# Patient Record
Sex: MALE | Race: WHITE | NOT HISPANIC OR LATINO | Employment: OTHER | URBAN - METROPOLITAN AREA
[De-identification: names, ages, dates, MRNs, and addresses within clinical notes are randomized per-mention and may not be internally consistent; named-entity substitution may affect disease eponyms.]

---

## 2017-06-16 RX ORDER — TAMSULOSIN HYDROCHLORIDE 0.4 MG/1
CAPSULE ORAL
Qty: 30 CAPSULE | Refills: 6 | Status: ON HOLD | OUTPATIENT
Start: 2017-06-16 | End: 2021-03-09 | Stop reason: HOSPADM

## 2018-05-07 RX ORDER — TAMSULOSIN HYDROCHLORIDE 0.4 MG/1
CAPSULE ORAL
Qty: 30 CAPSULE | OUTPATIENT
Start: 2018-05-07

## 2021-03-03 ENCOUNTER — HOSPITAL ENCOUNTER (INPATIENT)
Facility: HOSPITAL | Age: 56
LOS: 4 days | Discharge: HOME | DRG: 234 | End: 2021-03-09
Attending: EMERGENCY MEDICINE | Admitting: THORACIC SURGERY (CARDIOTHORACIC VASCULAR SURGERY)
Payer: MEDICARE

## 2021-03-03 DIAGNOSIS — Z95.1 HX OF CABG: ICD-10-CM

## 2021-03-03 DIAGNOSIS — I25.10 CORONARY ARTERY DISEASE, ANGINA PRESENCE UNSPECIFIED, UNSPECIFIED VESSEL OR LESION TYPE, UNSPECIFIED WHETHER NATIVE OR TRANSPLANTED HEART: ICD-10-CM

## 2021-03-03 DIAGNOSIS — R07.9 CHEST PAIN: ICD-10-CM

## 2021-03-03 DIAGNOSIS — R07.9 CHEST PAIN, UNSPECIFIED TYPE: ICD-10-CM

## 2021-03-03 DIAGNOSIS — Z95.1 S/P CABG X 1: Primary | ICD-10-CM

## 2021-03-03 DIAGNOSIS — I25.10 CAD (CORONARY ARTERY DISEASE): ICD-10-CM

## 2021-03-03 DIAGNOSIS — Z95.1 S/P CABG (CORONARY ARTERY BYPASS GRAFT): ICD-10-CM

## 2021-03-03 DIAGNOSIS — I25.10 CORONARY ARTERY DISEASE INVOLVING NATIVE CORONARY ARTERY OF NATIVE HEART, ANGINA PRESENCE UNSPECIFIED: ICD-10-CM

## 2021-03-03 LAB
ALBUMIN SERPL BCP-MCNC: 4.3 G/DL (ref 3.5–5.2)
ALP SERPL-CCNC: 55 U/L (ref 55–135)
ALT SERPL W/O P-5'-P-CCNC: 22 U/L (ref 10–44)
AMPHET+METHAMPHET UR QL: NEGATIVE
ANION GAP SERPL CALC-SCNC: 10 MMOL/L (ref 8–16)
APTT PPP: 25 SEC (ref 23.6–33.3)
AST SERPL-CCNC: 20 U/L (ref 10–40)
BARBITURATES UR QL SCN>200 NG/ML: NEGATIVE
BASOPHILS # BLD AUTO: 0.08 K/UL (ref 0–0.2)
BASOPHILS NFR BLD: 1 % (ref 0–1.9)
BENZODIAZ UR QL SCN>200 NG/ML: NEGATIVE
BILIRUB SERPL-MCNC: 0.7 MG/DL (ref 0.1–1)
BILIRUB UR QL STRIP: NEGATIVE
BNP SERPL-MCNC: 13 PG/ML (ref 0–99)
BNP SERPL-MCNC: 13 PG/ML (ref 0–99)
BUN SERPL-MCNC: 17 MG/DL (ref 6–20)
BZE UR QL SCN: NEGATIVE
CALCIUM SERPL-MCNC: 8.6 MG/DL (ref 8.7–10.5)
CANNABINOIDS UR QL SCN: NEGATIVE
CHLORIDE SERPL-SCNC: 107 MMOL/L (ref 95–110)
CLARITY UR: CLEAR
CO2 SERPL-SCNC: 23 MMOL/L (ref 23–29)
COLOR UR: YELLOW
CREAT SERPL-MCNC: 1.2 MG/DL (ref 0.5–1.4)
CREAT UR-MCNC: 113 MG/DL (ref 23–375)
DIFFERENTIAL METHOD: NORMAL
EOSINOPHIL # BLD AUTO: 0.5 K/UL (ref 0–0.5)
EOSINOPHIL NFR BLD: 5.9 % (ref 0–8)
ERYTHROCYTE [DISTWIDTH] IN BLOOD BY AUTOMATED COUNT: 12.6 % (ref 11.5–14.5)
EST. GFR  (AFRICAN AMERICAN): >60 ML/MIN/1.73 M^2
EST. GFR  (NON AFRICAN AMERICAN): >60 ML/MIN/1.73 M^2
GLUCOSE SERPL-MCNC: 116 MG/DL (ref 70–110)
GLUCOSE UR QL STRIP: NEGATIVE
HCT VFR BLD AUTO: 43.9 % (ref 40–54)
HGB BLD-MCNC: 14.5 G/DL (ref 14–18)
HGB UR QL STRIP: NEGATIVE
IMM GRANULOCYTES # BLD AUTO: 0.03 K/UL (ref 0–0.04)
IMM GRANULOCYTES NFR BLD AUTO: 0.4 % (ref 0–0.5)
INR PPP: 1.1
KETONES UR QL STRIP: NEGATIVE
LEUKOCYTE ESTERASE UR QL STRIP: NEGATIVE
LIPASE SERPL-CCNC: 25 U/L (ref 4–60)
LYMPHOCYTES # BLD AUTO: 1.6 K/UL (ref 1–4.8)
LYMPHOCYTES NFR BLD: 21 % (ref 18–48)
MAGNESIUM SERPL-MCNC: 1.8 MG/DL (ref 1.6–2.6)
MCH RBC QN AUTO: 28.4 PG (ref 27–31)
MCHC RBC AUTO-ENTMCNC: 33 G/DL (ref 32–36)
MCV RBC AUTO: 86 FL (ref 82–98)
MONOCYTES # BLD AUTO: 0.6 K/UL (ref 0.3–1)
MONOCYTES NFR BLD: 7.2 % (ref 4–15)
NEUTROPHILS # BLD AUTO: 5 K/UL (ref 1.8–7.7)
NEUTROPHILS NFR BLD: 64.5 % (ref 38–73)
NITRITE UR QL STRIP: NEGATIVE
NRBC BLD-RTO: 0 /100 WBC
OPIATES UR QL SCN: NORMAL
PCP UR QL SCN>25 NG/ML: NEGATIVE
PH UR STRIP: 7 [PH] (ref 5–8)
PLATELET # BLD AUTO: 180 K/UL (ref 150–350)
PMV BLD AUTO: 11.8 FL (ref 9.2–12.9)
POTASSIUM SERPL-SCNC: 4 MMOL/L (ref 3.5–5.1)
PROT SERPL-MCNC: 7.2 G/DL (ref 6–8.4)
PROT UR QL STRIP: NEGATIVE
PROTHROMBIN TIME: 13.2 SEC (ref 10.6–14.8)
RBC # BLD AUTO: 5.11 M/UL (ref 4.6–6.2)
SARS-COV-2 RDRP RESP QL NAA+PROBE: NEGATIVE
SODIUM SERPL-SCNC: 140 MMOL/L (ref 136–145)
SP GR UR STRIP: 1.02 (ref 1–1.03)
TOXICOLOGY INFORMATION: NORMAL
TROPONIN I SERPL DL<=0.01 NG/ML-MCNC: <0.03 NG/ML
TROPONIN I SERPL DL<=0.01 NG/ML-MCNC: <0.03 NG/ML
TSH SERPL DL<=0.005 MIU/L-ACNC: 1.55 UIU/ML (ref 0.34–5.6)
URN SPEC COLLECT METH UR: NORMAL
UROBILINOGEN UR STRIP-ACNC: NEGATIVE EU/DL
WBC # BLD AUTO: 7.67 K/UL (ref 3.9–12.7)

## 2021-03-03 PROCEDURE — 93010 EKG 12-LEAD: ICD-10-PCS | Mod: ,,, | Performed by: INTERNAL MEDICINE

## 2021-03-03 PROCEDURE — 83690 ASSAY OF LIPASE: CPT | Performed by: EMERGENCY MEDICINE

## 2021-03-03 PROCEDURE — 99285 EMERGENCY DEPT VISIT HI MDM: CPT | Mod: 25

## 2021-03-03 PROCEDURE — G0378 HOSPITAL OBSERVATION PER HR: HCPCS

## 2021-03-03 PROCEDURE — 25000003 PHARM REV CODE 250: Performed by: NURSE PRACTITIONER

## 2021-03-03 PROCEDURE — 36415 COLL VENOUS BLD VENIPUNCTURE: CPT | Performed by: NURSE PRACTITIONER

## 2021-03-03 PROCEDURE — 81003 URINALYSIS AUTO W/O SCOPE: CPT | Performed by: EMERGENCY MEDICINE

## 2021-03-03 PROCEDURE — 84484 ASSAY OF TROPONIN QUANT: CPT | Performed by: NURSE PRACTITIONER

## 2021-03-03 PROCEDURE — 85025 COMPLETE CBC W/AUTO DIFF WBC: CPT | Performed by: NURSE PRACTITIONER

## 2021-03-03 PROCEDURE — 80053 COMPREHEN METABOLIC PANEL: CPT | Performed by: NURSE PRACTITIONER

## 2021-03-03 PROCEDURE — 85610 PROTHROMBIN TIME: CPT | Performed by: NURSE PRACTITIONER

## 2021-03-03 PROCEDURE — 93005 ELECTROCARDIOGRAM TRACING: CPT | Performed by: INTERNAL MEDICINE

## 2021-03-03 PROCEDURE — 96372 THER/PROPH/DIAG INJ SC/IM: CPT

## 2021-03-03 PROCEDURE — 63600175 PHARM REV CODE 636 W HCPCS: Performed by: NURSE PRACTITIONER

## 2021-03-03 PROCEDURE — 84443 ASSAY THYROID STIM HORMONE: CPT | Performed by: EMERGENCY MEDICINE

## 2021-03-03 PROCEDURE — 80307 DRUG TEST PRSMV CHEM ANLYZR: CPT | Performed by: EMERGENCY MEDICINE

## 2021-03-03 PROCEDURE — 84484 ASSAY OF TROPONIN QUANT: CPT | Mod: 91 | Performed by: NURSE PRACTITIONER

## 2021-03-03 PROCEDURE — U0002 COVID-19 LAB TEST NON-CDC: HCPCS | Performed by: EMERGENCY MEDICINE

## 2021-03-03 PROCEDURE — 83735 ASSAY OF MAGNESIUM: CPT | Performed by: EMERGENCY MEDICINE

## 2021-03-03 PROCEDURE — 83880 ASSAY OF NATRIURETIC PEPTIDE: CPT | Performed by: NURSE PRACTITIONER

## 2021-03-03 PROCEDURE — 85730 THROMBOPLASTIN TIME PARTIAL: CPT | Performed by: EMERGENCY MEDICINE

## 2021-03-03 PROCEDURE — 93010 ELECTROCARDIOGRAM REPORT: CPT | Mod: ,,, | Performed by: INTERNAL MEDICINE

## 2021-03-03 RX ORDER — NAPROXEN SODIUM 220 MG/1
324 TABLET, FILM COATED ORAL ONCE
Status: COMPLETED | OUTPATIENT
Start: 2021-03-03 | End: 2021-03-03

## 2021-03-03 RX ORDER — NAPROXEN SODIUM 220 MG/1
81 TABLET, FILM COATED ORAL DAILY
Status: DISCONTINUED | OUTPATIENT
Start: 2021-03-04 | End: 2021-03-05

## 2021-03-03 RX ORDER — ZOLPIDEM TARTRATE 5 MG/1
10 TABLET ORAL NIGHTLY PRN
Status: DISCONTINUED | OUTPATIENT
Start: 2021-03-03 | End: 2021-03-05

## 2021-03-03 RX ORDER — MORPHINE SULFATE 15 MG/1
15 TABLET, FILM COATED, EXTENDED RELEASE ORAL EVERY 12 HOURS
Status: DISCONTINUED | OUTPATIENT
Start: 2021-03-03 | End: 2021-03-04

## 2021-03-03 RX ORDER — ONDANSETRON 2 MG/ML
4 INJECTION INTRAMUSCULAR; INTRAVENOUS EVERY 6 HOURS PRN
Status: DISCONTINUED | OUTPATIENT
Start: 2021-03-03 | End: 2021-03-05

## 2021-03-03 RX ORDER — SODIUM CHLORIDE 0.9 % (FLUSH) 0.9 %
10 SYRINGE (ML) INJECTION
Status: DISCONTINUED | OUTPATIENT
Start: 2021-03-03 | End: 2021-03-05

## 2021-03-03 RX ORDER — PANTOPRAZOLE SODIUM 40 MG/1
40 TABLET, DELAYED RELEASE ORAL DAILY
Status: DISCONTINUED | OUTPATIENT
Start: 2021-03-04 | End: 2021-03-05

## 2021-03-03 RX ORDER — TAMSULOSIN HYDROCHLORIDE 0.4 MG/1
1 CAPSULE ORAL DAILY
Status: DISCONTINUED | OUTPATIENT
Start: 2021-03-04 | End: 2021-03-05

## 2021-03-03 RX ORDER — ATORVASTATIN CALCIUM 10 MG/1
10 TABLET, FILM COATED ORAL DAILY
Status: DISCONTINUED | OUTPATIENT
Start: 2021-03-04 | End: 2021-03-05

## 2021-03-03 RX ORDER — ASPIRIN 325 MG
325 TABLET ORAL ONCE
Status: ON HOLD | COMMUNITY
End: 2021-03-09 | Stop reason: HOSPADM

## 2021-03-03 RX ORDER — LISINOPRIL AND HYDROCHLOROTHIAZIDE 10; 12.5 MG/1; MG/1
1 TABLET ORAL DAILY
Status: DISCONTINUED | OUTPATIENT
Start: 2021-03-04 | End: 2021-03-03

## 2021-03-03 RX ORDER — NITROGLYCERIN 0.4 MG/1
0.4 TABLET SUBLINGUAL EVERY 5 MIN PRN
Status: DISCONTINUED | OUTPATIENT
Start: 2021-03-03 | End: 2021-03-05

## 2021-03-03 RX ORDER — ENOXAPARIN SODIUM 100 MG/ML
40 INJECTION SUBCUTANEOUS EVERY 24 HOURS
Status: DISCONTINUED | OUTPATIENT
Start: 2021-03-03 | End: 2021-03-05

## 2021-03-03 RX ORDER — HYDROCHLOROTHIAZIDE 12.5 MG/1
12.5 TABLET ORAL DAILY
Status: DISCONTINUED | OUTPATIENT
Start: 2021-03-04 | End: 2021-03-05

## 2021-03-03 RX ORDER — LISINOPRIL 10 MG/1
10 TABLET ORAL DAILY
Status: DISCONTINUED | OUTPATIENT
Start: 2021-03-04 | End: 2021-03-05

## 2021-03-03 RX ORDER — ASPIRIN 325 MG
325 TABLET ORAL ONCE
Status: COMPLETED | OUTPATIENT
Start: 2021-03-03 | End: 2021-03-03

## 2021-03-03 RX ORDER — ACETAMINOPHEN 325 MG/1
650 TABLET ORAL EVERY 4 HOURS PRN
Status: DISCONTINUED | OUTPATIENT
Start: 2021-03-03 | End: 2021-03-05

## 2021-03-03 RX ADMIN — ASPIRIN 325 MG ORAL TABLET 325 MG: 325 PILL ORAL at 06:03

## 2021-03-03 RX ADMIN — ASPIRIN 81 MG 324 MG: 81 TABLET ORAL at 03:03

## 2021-03-03 RX ADMIN — ENOXAPARIN SODIUM 40 MG: 40 INJECTION SUBCUTANEOUS at 09:03

## 2021-03-03 RX ADMIN — MORPHINE SULFATE 15 MG: 15 TABLET, FILM COATED, EXTENDED RELEASE ORAL at 09:03

## 2021-03-04 ENCOUNTER — ANESTHESIA EVENT (OUTPATIENT)
Dept: SURGERY | Facility: HOSPITAL | Age: 56
DRG: 234 | End: 2021-03-04
Payer: MEDICARE

## 2021-03-04 ENCOUNTER — CLINICAL SUPPORT (OUTPATIENT)
Dept: CARDIOLOGY | Facility: HOSPITAL | Age: 56
DRG: 234 | End: 2021-03-04
Payer: MEDICARE

## 2021-03-04 PROBLEM — M54.9 CHRONIC BACK PAIN: Status: ACTIVE | Noted: 2021-03-04

## 2021-03-04 PROBLEM — I10 HYPERTENSION: Status: ACTIVE | Noted: 2021-03-04

## 2021-03-04 PROBLEM — N40.0 BPH (BENIGN PROSTATIC HYPERPLASIA): Status: ACTIVE | Noted: 2021-03-04

## 2021-03-04 PROBLEM — E78.00 HYPERCHOLESTEROLEMIA: Status: ACTIVE | Noted: 2021-03-04

## 2021-03-04 PROBLEM — Z79.891 OPIOID USE AGREEMENT EXISTS: Status: ACTIVE | Noted: 2021-03-04

## 2021-03-04 PROBLEM — G89.29 CHRONIC BACK PAIN: Status: ACTIVE | Noted: 2021-03-04

## 2021-03-04 LAB
ALBUMIN SERPL BCP-MCNC: 3.9 G/DL (ref 3.5–5.2)
ALP SERPL-CCNC: 50 U/L (ref 55–135)
ALT SERPL W/O P-5'-P-CCNC: 22 U/L (ref 10–44)
ANION GAP SERPL CALC-SCNC: 7 MMOL/L (ref 8–16)
AST SERPL-CCNC: 15 U/L (ref 10–40)
BASOPHILS # BLD AUTO: 0.06 K/UL (ref 0–0.2)
BASOPHILS NFR BLD: 0.8 % (ref 0–1.9)
BILIRUB SERPL-MCNC: 0.8 MG/DL (ref 0.1–1)
BUN SERPL-MCNC: 23 MG/DL (ref 6–20)
CALCIUM SERPL-MCNC: 8.8 MG/DL (ref 8.7–10.5)
CHLORIDE SERPL-SCNC: 107 MMOL/L (ref 95–110)
CHOLEST SERPL-MCNC: 191 MG/DL (ref 120–199)
CHOLEST/HDLC SERPL: 6 {RATIO} (ref 2–5)
CO2 SERPL-SCNC: 24 MMOL/L (ref 23–29)
CREAT SERPL-MCNC: 1.1 MG/DL (ref 0.5–1.4)
DIFFERENTIAL METHOD: NORMAL
EOSINOPHIL # BLD AUTO: 0.5 K/UL (ref 0–0.5)
EOSINOPHIL NFR BLD: 6.2 % (ref 0–8)
ERYTHROCYTE [DISTWIDTH] IN BLOOD BY AUTOMATED COUNT: 12.9 % (ref 11.5–14.5)
EST. GFR  (AFRICAN AMERICAN): >60 ML/MIN/1.73 M^2
EST. GFR  (NON AFRICAN AMERICAN): >60 ML/MIN/1.73 M^2
ESTIMATED AVG GLUCOSE: 126 MG/DL (ref 68–131)
GLUCOSE SERPL-MCNC: 121 MG/DL (ref 70–110)
GLUCOSE SERPL-MCNC: 86 MG/DL (ref 70–110)
HBA1C MFR BLD: 6 % (ref 4.5–6.2)
HCT VFR BLD AUTO: 42.6 % (ref 40–54)
HDLC SERPL-MCNC: 32 MG/DL (ref 40–75)
HDLC SERPL: 16.8 % (ref 20–50)
HGB BLD-MCNC: 14 G/DL (ref 14–18)
IMM GRANULOCYTES # BLD AUTO: 0.03 K/UL (ref 0–0.04)
IMM GRANULOCYTES NFR BLD AUTO: 0.4 % (ref 0–0.5)
LDLC SERPL CALC-MCNC: 140.4 MG/DL (ref 63–159)
LYMPHOCYTES # BLD AUTO: 2 K/UL (ref 1–4.8)
LYMPHOCYTES NFR BLD: 25.1 % (ref 18–48)
MAGNESIUM SERPL-MCNC: 2 MG/DL (ref 1.6–2.6)
MCH RBC QN AUTO: 28.2 PG (ref 27–31)
MCHC RBC AUTO-ENTMCNC: 32.9 G/DL (ref 32–36)
MCV RBC AUTO: 86 FL (ref 82–98)
MONOCYTES # BLD AUTO: 0.5 K/UL (ref 0.3–1)
MONOCYTES NFR BLD: 6.8 % (ref 4–15)
NEUTROPHILS # BLD AUTO: 4.8 K/UL (ref 1.8–7.7)
NEUTROPHILS NFR BLD: 60.7 % (ref 38–73)
NONHDLC SERPL-MCNC: 159 MG/DL
NRBC BLD-RTO: 0 /100 WBC
PLATELET # BLD AUTO: 165 K/UL (ref 150–350)
PMV BLD AUTO: 12.1 FL (ref 9.2–12.9)
POTASSIUM SERPL-SCNC: 4 MMOL/L (ref 3.5–5.1)
PROT SERPL-MCNC: 6.6 G/DL (ref 6–8.4)
RBC # BLD AUTO: 4.96 M/UL (ref 4.6–6.2)
SODIUM SERPL-SCNC: 138 MMOL/L (ref 136–145)
TRIGL SERPL-MCNC: 93 MG/DL (ref 30–150)
TROPONIN I SERPL DL<=0.01 NG/ML-MCNC: <0.03 NG/ML
TROPONIN I SERPL DL<=0.01 NG/ML-MCNC: <0.03 NG/ML
WBC # BLD AUTO: 7.94 K/UL (ref 3.9–12.7)

## 2021-03-04 PROCEDURE — 99900035 HC TECH TIME PER 15 MIN (STAT)

## 2021-03-04 PROCEDURE — C1769 GUIDE WIRE: HCPCS | Performed by: INTERNAL MEDICINE

## 2021-03-04 PROCEDURE — 99152 MOD SED SAME PHYS/QHP 5/>YRS: CPT | Performed by: INTERNAL MEDICINE

## 2021-03-04 PROCEDURE — 99900031 HC PATIENT EDUCATION (STAT)

## 2021-03-04 PROCEDURE — 25000242 PHARM REV CODE 250 ALT 637 W/ HCPCS: Performed by: NURSE PRACTITIONER

## 2021-03-04 PROCEDURE — 93351 STRESS ECHO (CUPID ONLY): ICD-10-PCS | Mod: 26,,, | Performed by: INTERNAL MEDICINE

## 2021-03-04 PROCEDURE — 84484 ASSAY OF TROPONIN QUANT: CPT | Performed by: NURSE PRACTITIONER

## 2021-03-04 PROCEDURE — 93459: ICD-10-PCS | Mod: 26,,, | Performed by: INTERNAL MEDICINE

## 2021-03-04 PROCEDURE — 99153 MOD SED SAME PHYS/QHP EA: CPT | Performed by: INTERNAL MEDICINE

## 2021-03-04 PROCEDURE — 99152 PR MOD CONSCIOUS SEDATION, SAME PHYS, 5+ YRS, FIRST 15 MIN: ICD-10-PCS | Mod: ,,, | Performed by: INTERNAL MEDICINE

## 2021-03-04 PROCEDURE — 25000003 PHARM REV CODE 250: Performed by: INTERNAL MEDICINE

## 2021-03-04 PROCEDURE — 99222 PR INITIAL HOSPITAL CARE,LEVL II: ICD-10-PCS | Mod: 25,,, | Performed by: INTERNAL MEDICINE

## 2021-03-04 PROCEDURE — 36415 COLL VENOUS BLD VENIPUNCTURE: CPT | Performed by: THORACIC SURGERY (CARDIOTHORACIC VASCULAR SURGERY)

## 2021-03-04 PROCEDURE — 36415 COLL VENOUS BLD VENIPUNCTURE: CPT | Performed by: NURSE PRACTITIONER

## 2021-03-04 PROCEDURE — 25500020 PHARM REV CODE 255: Performed by: INTERNAL MEDICINE

## 2021-03-04 PROCEDURE — 93459 L HRT ART/GRFT ANGIO: CPT | Performed by: INTERNAL MEDICINE

## 2021-03-04 PROCEDURE — 83735 ASSAY OF MAGNESIUM: CPT | Performed by: NURSE PRACTITIONER

## 2021-03-04 PROCEDURE — C1725 CATH, TRANSLUMIN NON-LASER: HCPCS | Performed by: INTERNAL MEDICINE

## 2021-03-04 PROCEDURE — 80053 COMPREHEN METABOLIC PANEL: CPT | Performed by: NURSE PRACTITIONER

## 2021-03-04 PROCEDURE — 84484 ASSAY OF TROPONIN QUANT: CPT | Mod: 91 | Performed by: NURSE PRACTITIONER

## 2021-03-04 PROCEDURE — 80061 LIPID PANEL: CPT | Performed by: NURSE PRACTITIONER

## 2021-03-04 PROCEDURE — 93351 STRESS TTE COMPLETE: CPT | Mod: 26,,, | Performed by: INTERNAL MEDICINE

## 2021-03-04 PROCEDURE — G0378 HOSPITAL OBSERVATION PER HR: HCPCS

## 2021-03-04 PROCEDURE — 94761 N-INVAS EAR/PLS OXIMETRY MLT: CPT

## 2021-03-04 PROCEDURE — 93459 L HRT ART/GRFT ANGIO: CPT | Mod: 26,,, | Performed by: INTERNAL MEDICINE

## 2021-03-04 PROCEDURE — 85025 COMPLETE CBC W/AUTO DIFF WBC: CPT | Performed by: NURSE PRACTITIONER

## 2021-03-04 PROCEDURE — 99152 MOD SED SAME PHYS/QHP 5/>YRS: CPT | Mod: ,,, | Performed by: INTERNAL MEDICINE

## 2021-03-04 PROCEDURE — C1894 INTRO/SHEATH, NON-LASER: HCPCS | Performed by: INTERNAL MEDICINE

## 2021-03-04 PROCEDURE — 83036 HEMOGLOBIN GLYCOSYLATED A1C: CPT | Performed by: NURSE PRACTITIONER

## 2021-03-04 PROCEDURE — 25000003 PHARM REV CODE 250: Performed by: NURSE PRACTITIONER

## 2021-03-04 PROCEDURE — 63600175 PHARM REV CODE 636 W HCPCS: Performed by: INTERNAL MEDICINE

## 2021-03-04 PROCEDURE — 93351 STRESS TTE COMPLETE: CPT

## 2021-03-04 PROCEDURE — C1760 CLOSURE DEV, VASC: HCPCS | Performed by: INTERNAL MEDICINE

## 2021-03-04 PROCEDURE — 99222 1ST HOSP IP/OBS MODERATE 55: CPT | Mod: 25,,, | Performed by: INTERNAL MEDICINE

## 2021-03-04 DEVICE — IMPLANTABLE DEVICE: Type: IMPLANTABLE DEVICE | Site: GROIN | Status: FUNCTIONAL

## 2021-03-04 RX ORDER — MIDAZOLAM HYDROCHLORIDE 1 MG/ML
INJECTION INTRAMUSCULAR; INTRAVENOUS
Status: DISCONTINUED | OUTPATIENT
Start: 2021-03-04 | End: 2021-03-04 | Stop reason: HOSPADM

## 2021-03-04 RX ORDER — MORPHINE SULFATE 15 MG/1
15 TABLET ORAL 3 TIMES DAILY PRN
Status: DISCONTINUED | OUTPATIENT
Start: 2021-03-04 | End: 2021-03-05

## 2021-03-04 RX ORDER — CLOPIDOGREL BISULFATE 75 MG/1
75 TABLET ORAL DAILY
Status: DISCONTINUED | OUTPATIENT
Start: 2021-03-05 | End: 2021-03-05

## 2021-03-04 RX ORDER — ACETAMINOPHEN 325 MG/1
650 TABLET ORAL EVERY 4 HOURS PRN
Status: DISCONTINUED | OUTPATIENT
Start: 2021-03-04 | End: 2021-03-05

## 2021-03-04 RX ORDER — NITROGLYCERIN 0.4 MG/1
0.4 TABLET SUBLINGUAL ONCE
Status: COMPLETED | OUTPATIENT
Start: 2021-03-04 | End: 2021-03-04

## 2021-03-04 RX ORDER — LIDOCAINE HYDROCHLORIDE 10 MG/ML
INJECTION, SOLUTION EPIDURAL; INFILTRATION; INTRACAUDAL; PERINEURAL
Status: DISCONTINUED | OUTPATIENT
Start: 2021-03-04 | End: 2021-03-04 | Stop reason: HOSPADM

## 2021-03-04 RX ORDER — SODIUM CHLORIDE 450 MG/100ML
INJECTION, SOLUTION INTRAVENOUS CONTINUOUS
Status: ACTIVE | OUTPATIENT
Start: 2021-03-04 | End: 2021-03-05

## 2021-03-04 RX ORDER — FENTANYL CITRATE 50 UG/ML
INJECTION, SOLUTION INTRAMUSCULAR; INTRAVENOUS
Status: DISCONTINUED | OUTPATIENT
Start: 2021-03-04 | End: 2021-03-04 | Stop reason: HOSPADM

## 2021-03-04 RX ORDER — SODIUM CHLORIDE 450 MG/100ML
INJECTION, SOLUTION INTRAVENOUS CONTINUOUS
Status: DISCONTINUED | OUTPATIENT
Start: 2021-03-04 | End: 2021-03-05

## 2021-03-04 RX ORDER — TALC
9 POWDER (GRAM) TOPICAL NIGHTLY PRN
Status: DISCONTINUED | OUTPATIENT
Start: 2021-03-04 | End: 2021-03-05

## 2021-03-04 RX ORDER — NITROGLYCERIN 0.4 MG/1
0.4 TABLET SUBLINGUAL EVERY 5 MIN PRN
Status: DISCONTINUED | OUTPATIENT
Start: 2021-03-04 | End: 2021-03-05

## 2021-03-04 RX ORDER — CEFAZOLIN SODIUM 2 G/50ML
2 SOLUTION INTRAVENOUS
Status: COMPLETED | OUTPATIENT
Start: 2021-03-04 | End: 2021-03-05

## 2021-03-04 RX ORDER — ONDANSETRON 4 MG/1
8 TABLET, ORALLY DISINTEGRATING ORAL EVERY 8 HOURS PRN
Status: DISCONTINUED | OUTPATIENT
Start: 2021-03-04 | End: 2021-03-05

## 2021-03-04 RX ADMIN — ASPIRIN 81 MG: 81 TABLET, CHEWABLE ORAL at 08:03

## 2021-03-04 RX ADMIN — ATORVASTATIN CALCIUM 10 MG: 10 TABLET, FILM COATED ORAL at 08:03

## 2021-03-04 RX ADMIN — SODIUM CHLORIDE: 0.45 INJECTION, SOLUTION INTRAVENOUS at 02:03

## 2021-03-04 RX ADMIN — NITROGLYCERIN 0.4 MG: 0.4 TABLET, ORALLY DISINTEGRATING SUBLINGUAL at 10:03

## 2021-03-04 RX ADMIN — PANTOPRAZOLE SODIUM 40 MG: 40 TABLET, DELAYED RELEASE ORAL at 08:03

## 2021-03-04 RX ADMIN — TAMSULOSIN HYDROCHLORIDE 0.4 MG: 0.4 CAPSULE ORAL at 08:03

## 2021-03-04 RX ADMIN — HYDROCHLOROTHIAZIDE 12.5 MG: 12.5 TABLET ORAL at 08:03

## 2021-03-04 RX ADMIN — MORPHINE SULFATE 15 MG: 15 TABLET ORAL at 09:03

## 2021-03-04 RX ADMIN — MORPHINE SULFATE 15 MG: 15 TABLET ORAL at 03:03

## 2021-03-04 RX ADMIN — MORPHINE SULFATE 15 MG: 15 TABLET, FILM COATED, EXTENDED RELEASE ORAL at 08:03

## 2021-03-04 RX ADMIN — LISINOPRIL 10 MG: 10 TABLET ORAL at 08:03

## 2021-03-05 ENCOUNTER — ANESTHESIA (OUTPATIENT)
Dept: SURGERY | Facility: HOSPITAL | Age: 56
DRG: 234 | End: 2021-03-05
Payer: MEDICARE

## 2021-03-05 PROBLEM — Z95.1 S/P CABG X 1: Status: ACTIVE | Noted: 2021-03-05

## 2021-03-05 LAB
ABO + RH BLD: NORMAL
ALLENS TEST: POSITIVE
ANION GAP SERPL CALC-SCNC: 14 MMOL/L (ref 8–16)
ANION GAP SERPL CALC-SCNC: 7 MMOL/L (ref 8–16)
ANION GAP SERPL CALC-SCNC: 7 MMOL/L (ref 8–16)
ANION GAP SERPL CALC-SCNC: 9 MMOL/L (ref 8–16)
APTT PPP: 26.4 SEC (ref 23.6–33.3)
APTT PPP: 34.9 SEC (ref 23.6–33.3)
BASOPHILS # BLD AUTO: 0.03 K/UL (ref 0–0.2)
BASOPHILS # BLD AUTO: 0.03 K/UL (ref 0–0.2)
BASOPHILS # BLD AUTO: 0.04 K/UL (ref 0–0.2)
BASOPHILS NFR BLD: 0 % (ref 0–1.9)
BASOPHILS NFR BLD: 0.2 % (ref 0–1.9)
BASOPHILS NFR BLD: 0.3 % (ref 0–1.9)
BASOPHILS NFR BLD: 0.5 % (ref 0–1.9)
BLD GP AB SCN CELLS X3 SERPL QL: NORMAL
BUN SERPL-MCNC: 16 MG/DL (ref 6–20)
BUN SERPL-MCNC: 17 MG/DL (ref 6–20)
BUN SERPL-MCNC: 19 MG/DL (ref 6–20)
BUN SERPL-MCNC: 21 MG/DL (ref 6–20)
CALCIUM SERPL-MCNC: 7.7 MG/DL (ref 8.7–10.5)
CALCIUM SERPL-MCNC: 8.1 MG/DL (ref 8.7–10.5)
CALCIUM SERPL-MCNC: 8.5 MG/DL (ref 8.7–10.5)
CALCIUM SERPL-MCNC: 9.2 MG/DL (ref 8.7–10.5)
CHLORIDE SERPL-SCNC: 104 MMOL/L (ref 95–110)
CHLORIDE SERPL-SCNC: 104 MMOL/L (ref 95–110)
CHLORIDE SERPL-SCNC: 105 MMOL/L (ref 95–110)
CHLORIDE SERPL-SCNC: 109 MMOL/L (ref 95–110)
CO2 SERPL-SCNC: 19 MMOL/L (ref 23–29)
CO2 SERPL-SCNC: 20 MMOL/L (ref 23–29)
CO2 SERPL-SCNC: 24 MMOL/L (ref 23–29)
CO2 SERPL-SCNC: 24 MMOL/L (ref 23–29)
CORRECTED TEMPERATURE (PCO2): 37.7 MMHG
CORRECTED TEMPERATURE (PCO2): 40.5 MMHG
CORRECTED TEMPERATURE (PCO2): 42.2 MMHG
CORRECTED TEMPERATURE (PCO2): 47.7 MMHG
CORRECTED TEMPERATURE (PCO2): 49.2 MMHG
CORRECTED TEMPERATURE (PH): 7.2
CORRECTED TEMPERATURE (PH): 7.26
CORRECTED TEMPERATURE (PH): 7.35
CORRECTED TEMPERATURE (PH): 7.39
CORRECTED TEMPERATURE (PH): 7.43
CORRECTED TEMPERATURE (PO2): 68.5 MMHG
CORRECTED TEMPERATURE (PO2): 82.2 MMHG
CORRECTED TEMPERATURE (PO2): 84.6 MMHG
CORRECTED TEMPERATURE (PO2): 98 MMHG
CREAT SERPL-MCNC: 0.8 MG/DL (ref 0.5–1.4)
CREAT SERPL-MCNC: 1 MG/DL (ref 0.5–1.4)
CREAT SERPL-MCNC: 1.5 MG/DL (ref 0.5–1.4)
CREAT SERPL-MCNC: 1.7 MG/DL (ref 0.5–1.4)
DIFFERENTIAL METHOD: ABNORMAL
DIFFERENTIAL METHOD: NORMAL
EOSINOPHIL # BLD AUTO: 0 K/UL (ref 0–0.5)
EOSINOPHIL # BLD AUTO: 0.2 K/UL (ref 0–0.5)
EOSINOPHIL # BLD AUTO: 0.2 K/UL (ref 0–0.5)
EOSINOPHIL NFR BLD: 0 % (ref 0–8)
EOSINOPHIL NFR BLD: 0 % (ref 0–8)
EOSINOPHIL NFR BLD: 1.7 % (ref 0–8)
EOSINOPHIL NFR BLD: 2.1 % (ref 0–8)
ERYTHROCYTE [DISTWIDTH] IN BLOOD BY AUTOMATED COUNT: 12.6 % (ref 11.5–14.5)
ERYTHROCYTE [DISTWIDTH] IN BLOOD BY AUTOMATED COUNT: 12.8 % (ref 11.5–14.5)
ERYTHROCYTE [DISTWIDTH] IN BLOOD BY AUTOMATED COUNT: 12.8 % (ref 11.5–14.5)
ERYTHROCYTE [DISTWIDTH] IN BLOOD BY AUTOMATED COUNT: 12.9 % (ref 11.5–14.5)
EST. GFR  (AFRICAN AMERICAN): 51.7 ML/MIN/1.73 M^2
EST. GFR  (AFRICAN AMERICAN): >60 ML/MIN/1.73 M^2
EST. GFR  (NON AFRICAN AMERICAN): 44.7 ML/MIN/1.73 M^2
EST. GFR  (NON AFRICAN AMERICAN): 52 ML/MIN/1.73 M^2
EST. GFR  (NON AFRICAN AMERICAN): >60 ML/MIN/1.73 M^2
EST. GFR  (NON AFRICAN AMERICAN): >60 ML/MIN/1.73 M^2
FIO2: 100 %
FIO2: 21 %
FIO2: 32 %
FIO2: 40 %
FIO2: 40 %
GLUCOSE SERPL-MCNC: 117 MG/DL (ref 70–110)
GLUCOSE SERPL-MCNC: 120 MG/DL (ref 70–110)
GLUCOSE SERPL-MCNC: 122 MG/DL (ref 70–110)
GLUCOSE SERPL-MCNC: 123 MG/DL (ref 70–110)
GLUCOSE SERPL-MCNC: 125 MG/DL (ref 70–110)
GLUCOSE SERPL-MCNC: 125 MG/DL (ref 70–110)
GLUCOSE SERPL-MCNC: 126 MG/DL (ref 70–110)
GLUCOSE SERPL-MCNC: 127 MG/DL (ref 70–100)
GLUCOSE SERPL-MCNC: 135 MG/DL (ref 70–110)
GLUCOSE SERPL-MCNC: 138 MG/DL (ref 70–110)
GLUCOSE SERPL-MCNC: 153 MG/DL (ref 70–100)
GLUCOSE SERPL-MCNC: 159 MG/DL (ref 70–110)
GLUCOSE SERPL-MCNC: 192 MG/DL (ref 70–100)
GLUCOSE SERPL-MCNC: 221 MG/DL (ref 70–100)
GLUCOSE SERPL-MCNC: 98 MG/DL (ref 70–110)
HCO3 UR-SCNC: 18.4 MMOL/L (ref 24–28)
HCO3 UR-SCNC: 22.3 MMOL/L (ref 24–28)
HCO3 UR-SCNC: 23 MMOL/L (ref 24–28)
HCO3 UR-SCNC: 23.2 MMOL/L (ref 24–28)
HCO3 UR-SCNC: 24.6 MMOL/L (ref 24–28)
HCO3 UR-SCNC: 25.3 MMOL/L (ref 24–28)
HCO3 UR-SCNC: 26.7 MMOL/L (ref 24–28)
HCO3 UR-SCNC: 28.8 MMOL/L (ref 24–28)
HCT VFR BLD AUTO: 36.1 % (ref 40–54)
HCT VFR BLD AUTO: 37.8 % (ref 40–54)
HCT VFR BLD AUTO: 40.5 % (ref 40–54)
HCT VFR BLD AUTO: 43.6 % (ref 40–54)
HCT VFR BLD CALC: 35 %PCV (ref 36–54)
HCT VFR BLD CALC: 36 %PCV (ref 36–54)
HCT VFR BLD CALC: 37.8 % (ref 36–54)
HCT VFR BLD CALC: 40 %PCV (ref 36–54)
HCT VFR BLD CALC: 41.7 % (ref 36–54)
HCT VFR BLD CALC: 43.1 % (ref 36–54)
HCT VFR BLD CALC: 44.3 % (ref 36–54)
HGB BLD-MCNC: 11.8 G/DL (ref 14–18)
HGB BLD-MCNC: 12.2 G/DL (ref 14–18)
HGB BLD-MCNC: 13.1 G/DL (ref 14–18)
HGB BLD-MCNC: 14.2 G/DL (ref 14–18)
HGB BLD-MCNC: 14.7 G/DL
IMM GRANULOCYTES # BLD AUTO: 0.04 K/UL (ref 0–0.04)
IMM GRANULOCYTES # BLD AUTO: 0.05 K/UL (ref 0–0.04)
IMM GRANULOCYTES # BLD AUTO: 0.07 K/UL (ref 0–0.04)
IMM GRANULOCYTES # BLD AUTO: ABNORMAL K/UL (ref 0–0.04)
IMM GRANULOCYTES NFR BLD AUTO: 0.5 % (ref 0–0.5)
IMM GRANULOCYTES NFR BLD AUTO: ABNORMAL % (ref 0–0.5)
INR PPP: 1.1
INR PPP: 1.3
LPM: 3
LYMPHOCYTES # BLD AUTO: 0.9 K/UL (ref 1–4.8)
LYMPHOCYTES # BLD AUTO: 1.6 K/UL (ref 1–4.8)
LYMPHOCYTES # BLD AUTO: 1.6 K/UL (ref 1–4.8)
LYMPHOCYTES NFR BLD: 16.8 % (ref 18–48)
LYMPHOCYTES NFR BLD: 18.9 % (ref 18–48)
LYMPHOCYTES NFR BLD: 5.7 % (ref 18–48)
LYMPHOCYTES NFR BLD: 7 % (ref 18–48)
MAGNESIUM SERPL-MCNC: 1.6 MG/DL (ref 1.6–2.6)
MAGNESIUM SERPL-MCNC: 1.9 MG/DL (ref 1.6–2.6)
MAGNESIUM SERPL-MCNC: 2.1 MG/DL (ref 1.6–2.6)
MAGNESIUM SERPL-MCNC: 2.5 MG/DL (ref 1.6–2.6)
MCH RBC QN AUTO: 28 PG (ref 27–31)
MCH RBC QN AUTO: 28.2 PG (ref 27–31)
MCH RBC QN AUTO: 28.3 PG (ref 27–31)
MCH RBC QN AUTO: 28.4 PG (ref 27–31)
MCHC RBC AUTO-ENTMCNC: 32.3 G/DL (ref 32–36)
MCHC RBC AUTO-ENTMCNC: 32.3 G/DL (ref 32–36)
MCHC RBC AUTO-ENTMCNC: 32.6 G/DL (ref 32–36)
MCHC RBC AUTO-ENTMCNC: 32.7 G/DL (ref 32–36)
MCV RBC AUTO: 87 FL (ref 82–98)
MODE: ABNORMAL
MONOCYTES # BLD AUTO: 0.5 K/UL (ref 0.3–1)
MONOCYTES # BLD AUTO: 0.6 K/UL (ref 0.3–1)
MONOCYTES # BLD AUTO: 1.5 K/UL (ref 0.3–1)
MONOCYTES NFR BLD: 10 % (ref 4–15)
MONOCYTES NFR BLD: 5.7 % (ref 4–15)
MONOCYTES NFR BLD: 6 % (ref 4–15)
MONOCYTES NFR BLD: 8 % (ref 4–15)
MRSA SCREEN BY PCR: NEGATIVE
NEUTROPHILS # BLD AUTO: 12.6 K/UL (ref 1.8–7.7)
NEUTROPHILS # BLD AUTO: 6.3 K/UL (ref 1.8–7.7)
NEUTROPHILS # BLD AUTO: 6.9 K/UL (ref 1.8–7.7)
NEUTROPHILS NFR BLD: 65 % (ref 38–73)
NEUTROPHILS NFR BLD: 72.3 % (ref 38–73)
NEUTROPHILS NFR BLD: 74.7 % (ref 38–73)
NEUTROPHILS NFR BLD: 83.6 % (ref 38–73)
NEUTS BAND NFR BLD MANUAL: 20 %
NRBC BLD-RTO: 0 /100 WBC
O2DEVICE: ABNORMAL
O2DEVICE: NORMAL
PCO2 BLDA: 40.1 MMHG (ref 35–45)
PCO2 BLDA: 40.5 MMHG (ref 35–45)
PCO2 BLDA: 42.2 MMHG (ref 35–45)
PCO2 BLDA: 42.3 MMHG (ref 35–45)
PCO2 BLDA: 42.9 MMHG (ref 35–45)
PCO2 BLDA: 45.2 MMHG (ref 35–45)
PCO2 BLDA: 47.7 MMHG (ref 35–45)
PCO2 BLDA: 49.2 MMHG (ref 35–45)
PEEP: 5
PH SMN: 7.2 [PH] (ref 7.35–7.45)
PH SMN: 7.26 [PH] (ref 7.35–7.45)
PH SMN: 7.34 [PH] (ref 7.35–7.45)
PH SMN: 7.35 [PH] (ref 7.35–7.45)
PH SMN: 7.37 [PH] (ref 7.35–7.45)
PH SMN: 7.38 [PH] (ref 7.35–7.45)
PH SMN: 7.43 [PH] (ref 7.35–7.45)
PH SMN: 7.43 [PH] (ref 7.35–7.45)
PLATELET # BLD AUTO: 142 K/UL (ref 150–350)
PLATELET # BLD AUTO: 172 K/UL (ref 150–350)
PLATELET # BLD AUTO: 175 K/UL (ref 150–350)
PLATELET # BLD AUTO: 253 K/UL (ref 150–350)
PLATELET BLD QL SMEAR: ABNORMAL
PMV BLD AUTO: 11.5 FL (ref 9.2–12.9)
PMV BLD AUTO: 11.7 FL (ref 9.2–12.9)
PMV BLD AUTO: 12 FL (ref 9.2–12.9)
PMV BLD AUTO: 12 FL (ref 9.2–12.9)
PO2 BLDA: 464 MMHG (ref 80–100)
PO2 BLDA: 470 MMHG (ref 80–100)
PO2 BLDA: 527 MMHG (ref 80–100)
PO2 BLDA: 574 MMHG (ref 80–100)
PO2 BLDA: 68.5 MMHG (ref 80–100)
PO2 BLDA: 82.2 MMHG (ref 80–100)
PO2 BLDA: 84.6 MMHG (ref 80–100)
PO2 BLDA: 98 MMHG (ref 80–100)
POC ACTIVATED CLOTTING TIME K: 109 SEC (ref 74–137)
POC ACTIVATED CLOTTING TIME K: 114 SEC (ref 74–137)
POC ACTIVATED CLOTTING TIME K: 274 SEC (ref 74–137)
POC ACTIVATED CLOTTING TIME K: 290 SEC (ref 74–137)
POC ACTIVATED CLOTTING TIME K: 296 SEC (ref 74–137)
POC BASE DEFICIT: -2 MMOL/L (ref -2–2)
POC BASE DEFICIT: -2.6 MMOL/L (ref -2–2)
POC BASE DEFICIT: -4.6 MMOL/L (ref -2–2)
POC BASE DEFICIT: -9.7 MMOL/L (ref -2–2)
POC BASE DEFICIT: 2.4 MMOL/L (ref -2–2)
POC BE: -1 MMOL/L
POC BE: 0 MMOL/L
POC BE: 5 MMOL/L
POC COHB: 0.2 % (ref 0–5)
POC IONIZED CALCIUM: 1.08 MMOL/L (ref 1.06–1.42)
POC IONIZED CALCIUM: 1.12 MMOL/L (ref 1.06–1.42)
POC IONIZED CALCIUM: 1.13 MMOL/L (ref 1.06–1.42)
POC IONIZED CALCIUM: 1.13 MMOL/L (ref 1.06–1.42)
POC IONIZED CALCIUM: 1.16 MMOL/L (ref 1.06–1.42)
POC IONIZED CALCIUM: 1.17 MMOL/L (ref 1.06–1.42)
POC IONIZED CALCIUM: 1.19 MMOL/L (ref 1.06–1.42)
POC METHB: 0.9 % (ref 0–3)
POC O2HB: 96.9 %
POC PERFORMED BY: ABNORMAL
POC PERFORMED BY: NORMAL
POC SATURATED O2: 100 % (ref 95–100)
POC SATURATED O2: 93.6 % (ref 95–100)
POC SATURATED O2: 94.4 % (ref 95–100)
POC SATURATED O2: 95 % (ref 95–100)
POC SATURATED O2: 98 % (ref 95–100)
POC SATURATED O2: 98.7 % (ref 95–100)
POC SET RR: 16
POC TCO2: 19.9 MMOL/L (ref 10.3–12)
POC TCO2: 23.8 MMOL/L (ref 10.3–12)
POC TCO2: 24.3 MMOL/L (ref 10.3–12)
POC TCO2: 24.5 MMOL/L (ref 10.3–12)
POC TCO2: 26 MMOL/L (ref 23–27)
POC TCO2: 27 MMOL/L (ref 23–27)
POC TCO2: 28 MMOL/L (ref 10.3–12)
POC TCO2: 30 MMOL/L (ref 23–27)
POC TEMPERATURE: 35.6 C
POC TEMPERATURE: 37 C
POTASSIUM BLD-SCNC: 3.6 MMOL/L (ref 3.5–5.1)
POTASSIUM BLD-SCNC: 3.8 MMOL/L (ref 3.5–5.1)
POTASSIUM BLD-SCNC: 4 MMOL/L (ref 3.5–5.1)
POTASSIUM BLD-SCNC: 4.2 MMOL/L (ref 3.5–5.1)
POTASSIUM BLD-SCNC: 4.2 MMOL/L (ref 3.5–5.1)
POTASSIUM BLD-SCNC: 4.3 MMOL/L (ref 3.5–5.1)
POTASSIUM BLD-SCNC: 4.5 MMOL/L (ref 3.5–5.1)
POTASSIUM SERPL-SCNC: 4 MMOL/L (ref 3.5–5.1)
POTASSIUM SERPL-SCNC: 4.1 MMOL/L (ref 3.5–5.1)
POTASSIUM SERPL-SCNC: 4.2 MMOL/L (ref 3.5–5.1)
POTASSIUM SERPL-SCNC: 4.2 MMOL/L (ref 3.5–5.1)
PRESSURE SUPPORT: 10
PROTHROMBIN TIME: 13.9 SEC (ref 10.6–14.8)
PROTHROMBIN TIME: 15.7 SEC (ref 10.6–14.8)
RBC # BLD AUTO: 4.16 M/UL (ref 4.6–6.2)
RBC # BLD AUTO: 4.36 M/UL (ref 4.6–6.2)
RBC # BLD AUTO: 4.65 M/UL (ref 4.6–6.2)
RBC # BLD AUTO: 5.02 M/UL (ref 4.6–6.2)
SAMPLE: ABNORMAL
SAMPLE: NORMAL
SAMPLE: NORMAL
SODIUM BLD-SCNC: 139 MMOL/L (ref 136–145)
SODIUM BLD-SCNC: 139 MMOL/L (ref 136–145)
SODIUM BLD-SCNC: 140 MMOL/L (ref 136–145)
SODIUM BLD-SCNC: 140 MMOL/L (ref 136–145)
SODIUM BLD-SCNC: 141 MMOL/L (ref 136–145)
SODIUM BLD-SCNC: 142 MMOL/L (ref 136–145)
SODIUM BLD-SCNC: 143 MMOL/L (ref 136–145)
SODIUM SERPL-SCNC: 135 MMOL/L (ref 136–145)
SODIUM SERPL-SCNC: 136 MMOL/L (ref 136–145)
SODIUM SERPL-SCNC: 137 MMOL/L (ref 136–145)
SODIUM SERPL-SCNC: 138 MMOL/L (ref 136–145)
SPECIMEN SOURCE: ABNORMAL
SPECIMEN SOURCE: NORMAL
VT: 600
WBC # BLD AUTO: 15.06 K/UL (ref 3.9–12.7)
WBC # BLD AUTO: 23.34 K/UL (ref 3.9–12.7)
WBC # BLD AUTO: 8.67 K/UL (ref 3.9–12.7)
WBC # BLD AUTO: 9.3 K/UL (ref 3.9–12.7)

## 2021-03-05 PROCEDURE — 84132 ASSAY OF SERUM POTASSIUM: CPT

## 2021-03-05 PROCEDURE — 37000008 HC ANESTHESIA 1ST 15 MINUTES: Performed by: THORACIC SURGERY (CARDIOTHORACIC VASCULAR SURGERY)

## 2021-03-05 PROCEDURE — 27000080 OPTIME MED/SURG SUP & DEVICES GENERAL CLASSIFICATION: Performed by: THORACIC SURGERY (CARDIOTHORACIC VASCULAR SURGERY)

## 2021-03-05 PROCEDURE — 85025 COMPLETE CBC W/AUTO DIFF WBC: CPT | Performed by: INTERNAL MEDICINE

## 2021-03-05 PROCEDURE — 84295 ASSAY OF SERUM SODIUM: CPT

## 2021-03-05 PROCEDURE — C9248 INJ, CLEVIDIPINE BUTYRATE: HCPCS

## 2021-03-05 PROCEDURE — 36600 WITHDRAWAL OF ARTERIAL BLOOD: CPT

## 2021-03-05 PROCEDURE — 85730 THROMBOPLASTIN TIME PARTIAL: CPT | Performed by: THORACIC SURGERY (CARDIOTHORACIC VASCULAR SURGERY)

## 2021-03-05 PROCEDURE — C1729 CATH, DRAINAGE: HCPCS | Performed by: THORACIC SURGERY (CARDIOTHORACIC VASCULAR SURGERY)

## 2021-03-05 PROCEDURE — 87641 MR-STAPH DNA AMP PROBE: CPT | Performed by: THORACIC SURGERY (CARDIOTHORACIC VASCULAR SURGERY)

## 2021-03-05 PROCEDURE — 86900 BLOOD TYPING SEROLOGIC ABO: CPT | Performed by: THORACIC SURGERY (CARDIOTHORACIC VASCULAR SURGERY)

## 2021-03-05 PROCEDURE — 82962 GLUCOSE BLOOD TEST: CPT

## 2021-03-05 PROCEDURE — C9248 INJ, CLEVIDIPINE BUTYRATE: HCPCS | Performed by: THORACIC SURGERY (CARDIOTHORACIC VASCULAR SURGERY)

## 2021-03-05 PROCEDURE — 27100025 HC TUBING, SET FLUID WARMER: Performed by: STUDENT IN AN ORGANIZED HEALTH CARE EDUCATION/TRAINING PROGRAM

## 2021-03-05 PROCEDURE — 27000221 HC OXYGEN, UP TO 24 HOURS

## 2021-03-05 PROCEDURE — 82803 BLOOD GASES ANY COMBINATION: CPT

## 2021-03-05 PROCEDURE — 85730 THROMBOPLASTIN TIME PARTIAL: CPT | Mod: 91 | Performed by: THORACIC SURGERY (CARDIOTHORACIC VASCULAR SURGERY)

## 2021-03-05 PROCEDURE — 27000675 HC TUBING MICRODRIP: Performed by: STUDENT IN AN ORGANIZED HEALTH CARE EDUCATION/TRAINING PROGRAM

## 2021-03-05 PROCEDURE — 37000009 HC ANESTHESIA EA ADD 15 MINS: Performed by: THORACIC SURGERY (CARDIOTHORACIC VASCULAR SURGERY)

## 2021-03-05 PROCEDURE — 27000671 HC TUBING MICROBORE EXT: Performed by: STUDENT IN AN ORGANIZED HEALTH CARE EDUCATION/TRAINING PROGRAM

## 2021-03-05 PROCEDURE — 85007 BL SMEAR W/DIFF WBC COUNT: CPT | Performed by: THORACIC SURGERY (CARDIOTHORACIC VASCULAR SURGERY)

## 2021-03-05 PROCEDURE — 86920 COMPATIBILITY TEST SPIN: CPT | Performed by: THORACIC SURGERY (CARDIOTHORACIC VASCULAR SURGERY)

## 2021-03-05 PROCEDURE — 80048 BASIC METABOLIC PNL TOTAL CA: CPT | Mod: 91 | Performed by: THORACIC SURGERY (CARDIOTHORACIC VASCULAR SURGERY)

## 2021-03-05 PROCEDURE — 83735 ASSAY OF MAGNESIUM: CPT | Performed by: INTERNAL MEDICINE

## 2021-03-05 PROCEDURE — 25000003 PHARM REV CODE 250: Performed by: THORACIC SURGERY (CARDIOTHORACIC VASCULAR SURGERY)

## 2021-03-05 PROCEDURE — 25000003 PHARM REV CODE 250

## 2021-03-05 PROCEDURE — 93005 ELECTROCARDIOGRAM TRACING: CPT | Performed by: INTERNAL MEDICINE

## 2021-03-05 PROCEDURE — 27000683 HC PILLOW THERAPEUTIC

## 2021-03-05 PROCEDURE — 63600175 PHARM REV CODE 636 W HCPCS: Performed by: THORACIC SURGERY (CARDIOTHORACIC VASCULAR SURGERY)

## 2021-03-05 PROCEDURE — 27000673 HC TUBING BLOOD Y: Performed by: STUDENT IN AN ORGANIZED HEALTH CARE EDUCATION/TRAINING PROGRAM

## 2021-03-05 PROCEDURE — 20000000 HC ICU ROOM

## 2021-03-05 PROCEDURE — 27201107 HC STYLET, STANDARD: Performed by: STUDENT IN AN ORGANIZED HEALTH CARE EDUCATION/TRAINING PROGRAM

## 2021-03-05 PROCEDURE — 80048 BASIC METABOLIC PNL TOTAL CA: CPT | Performed by: INTERNAL MEDICINE

## 2021-03-05 PROCEDURE — 82330 ASSAY OF CALCIUM: CPT

## 2021-03-05 PROCEDURE — 27000666 HC INFUSOR PRESSURE BAG: Performed by: STUDENT IN AN ORGANIZED HEALTH CARE EDUCATION/TRAINING PROGRAM

## 2021-03-05 PROCEDURE — C1898 LEAD, PMKR, OTHER THAN TRANS: HCPCS | Performed by: THORACIC SURGERY (CARDIOTHORACIC VASCULAR SURGERY)

## 2021-03-05 PROCEDURE — 27202276 HC INTRODUCER, PERC 8 FR: Performed by: STUDENT IN AN ORGANIZED HEALTH CARE EDUCATION/TRAINING PROGRAM

## 2021-03-05 PROCEDURE — 99900035 HC TECH TIME PER 15 MIN (STAT)

## 2021-03-05 PROCEDURE — 27000676 HC TUBING PRIMARY PLUMSET: Performed by: STUDENT IN AN ORGANIZED HEALTH CARE EDUCATION/TRAINING PROGRAM

## 2021-03-05 PROCEDURE — 27201423 OPTIME MED/SURG SUP & DEVICES STERILE SUPPLY: Performed by: THORACIC SURGERY (CARDIOTHORACIC VASCULAR SURGERY)

## 2021-03-05 PROCEDURE — 85610 PROTHROMBIN TIME: CPT | Mod: 91 | Performed by: THORACIC SURGERY (CARDIOTHORACIC VASCULAR SURGERY)

## 2021-03-05 PROCEDURE — 63600175 PHARM REV CODE 636 W HCPCS: Performed by: NURSE ANESTHETIST, CERTIFIED REGISTERED

## 2021-03-05 PROCEDURE — 37799 UNLISTED PX VASCULAR SURGERY: CPT

## 2021-03-05 PROCEDURE — 36000712 HC OR TIME LEV V 1ST 15 MIN: Performed by: THORACIC SURGERY (CARDIOTHORACIC VASCULAR SURGERY)

## 2021-03-05 PROCEDURE — 93010 EKG 12-LEAD: ICD-10-PCS | Mod: ,,, | Performed by: INTERNAL MEDICINE

## 2021-03-05 PROCEDURE — 27202107 HC XP QUATRO SENSOR: Performed by: STUDENT IN AN ORGANIZED HEALTH CARE EDUCATION/TRAINING PROGRAM

## 2021-03-05 PROCEDURE — C1713 ANCHOR/SCREW BN/BN,TIS/BN: HCPCS | Performed by: THORACIC SURGERY (CARDIOTHORACIC VASCULAR SURGERY)

## 2021-03-05 PROCEDURE — 63600175 PHARM REV CODE 636 W HCPCS

## 2021-03-05 PROCEDURE — 25000242 PHARM REV CODE 250 ALT 637 W/ HCPCS: Performed by: THORACIC SURGERY (CARDIOTHORACIC VASCULAR SURGERY)

## 2021-03-05 PROCEDURE — 94002 VENT MGMT INPAT INIT DAY: CPT

## 2021-03-05 PROCEDURE — 94761 N-INVAS EAR/PLS OXIMETRY MLT: CPT

## 2021-03-05 PROCEDURE — 85027 COMPLETE CBC AUTOMATED: CPT | Performed by: THORACIC SURGERY (CARDIOTHORACIC VASCULAR SURGERY)

## 2021-03-05 PROCEDURE — 27000658 HC ARTERIAL LINE ALL: Performed by: STUDENT IN AN ORGANIZED HEALTH CARE EDUCATION/TRAINING PROGRAM

## 2021-03-05 PROCEDURE — 93010 ELECTROCARDIOGRAM REPORT: CPT | Mod: 76,,, | Performed by: INTERNAL MEDICINE

## 2021-03-05 PROCEDURE — 83735 ASSAY OF MAGNESIUM: CPT | Mod: 91 | Performed by: THORACIC SURGERY (CARDIOTHORACIC VASCULAR SURGERY)

## 2021-03-05 PROCEDURE — 27000656 HC EYE GOGGLES: Performed by: STUDENT IN AN ORGANIZED HEALTH CARE EDUCATION/TRAINING PROGRAM

## 2021-03-05 PROCEDURE — 85014 HEMATOCRIT: CPT

## 2021-03-05 PROCEDURE — 27202163 HC CATH MULTI LUMEN: Performed by: STUDENT IN AN ORGANIZED HEALTH CARE EDUCATION/TRAINING PROGRAM

## 2021-03-05 PROCEDURE — 99900031 HC PATIENT EDUCATION (STAT)

## 2021-03-05 PROCEDURE — 94640 AIRWAY INHALATION TREATMENT: CPT

## 2021-03-05 PROCEDURE — 25000003 PHARM REV CODE 250: Performed by: NURSE ANESTHETIST, CERTIFIED REGISTERED

## 2021-03-05 PROCEDURE — 85610 PROTHROMBIN TIME: CPT | Performed by: THORACIC SURGERY (CARDIOTHORACIC VASCULAR SURGERY)

## 2021-03-05 PROCEDURE — 93010 ELECTROCARDIOGRAM REPORT: CPT | Mod: ,,, | Performed by: INTERNAL MEDICINE

## 2021-03-05 PROCEDURE — 85025 COMPLETE CBC W/AUTO DIFF WBC: CPT | Mod: 91 | Performed by: THORACIC SURGERY (CARDIOTHORACIC VASCULAR SURGERY)

## 2021-03-05 PROCEDURE — 36415 COLL VENOUS BLD VENIPUNCTURE: CPT | Performed by: INTERNAL MEDICINE

## 2021-03-05 PROCEDURE — 36000713 HC OR TIME LEV V EA ADD 15 MIN: Performed by: THORACIC SURGERY (CARDIOTHORACIC VASCULAR SURGERY)

## 2021-03-05 PROCEDURE — 27200678 HC TRANSDUCER MONITOR KIT TRIPLE: Performed by: STUDENT IN AN ORGANIZED HEALTH CARE EDUCATION/TRAINING PROGRAM

## 2021-03-05 DEVICE — ZIPFIX STERNAL W/NEEDLE STERILE: Type: IMPLANTABLE DEVICE | Site: CHEST | Status: FUNCTIONAL

## 2021-03-05 RX ORDER — SODIUM BICARBONATE 1 MEQ/ML
SYRINGE (ML) INTRAVENOUS
Status: COMPLETED
Start: 2021-03-05 | End: 2021-03-05

## 2021-03-05 RX ORDER — ROCURONIUM BROMIDE 10 MG/ML
INJECTION, SOLUTION INTRAVENOUS
Status: DISCONTINUED | OUTPATIENT
Start: 2021-03-05 | End: 2021-03-05

## 2021-03-05 RX ORDER — MORPHINE SULFATE 2 MG/ML
2 INJECTION, SOLUTION INTRAMUSCULAR; INTRAVENOUS
Status: DISCONTINUED | OUTPATIENT
Start: 2021-03-05 | End: 2021-03-09 | Stop reason: HOSPADM

## 2021-03-05 RX ORDER — HEPARIN SODIUM 10000 [USP'U]/ML
INJECTION, SOLUTION INTRAVENOUS; SUBCUTANEOUS
Status: DISCONTINUED | OUTPATIENT
Start: 2021-03-05 | End: 2021-03-05

## 2021-03-05 RX ORDER — CALCIUM CHLORIDE, MAGNESIUM CHLORIDE, POTASSIUM CHLORIDE AND SODIUM CHLORIDE 17.6; 325.3; 119.3; 643 MG/100ML; MG/100ML; MG/100ML; MG/100ML
SOLUTION INTRA-ARTERIAL ONCE
Status: DISCONTINUED | OUTPATIENT
Start: 2021-03-05 | End: 2021-03-05

## 2021-03-05 RX ORDER — MORPHINE SULFATE 15 MG/1
15 TABLET ORAL 3 TIMES DAILY PRN
Status: DISCONTINUED | OUTPATIENT
Start: 2021-03-05 | End: 2021-03-08

## 2021-03-05 RX ORDER — PROTAMINE SULFATE 10 MG/ML
INJECTION, SOLUTION INTRAVENOUS
Status: DISCONTINUED | OUTPATIENT
Start: 2021-03-05 | End: 2021-03-05

## 2021-03-05 RX ORDER — IPRATROPIUM BROMIDE AND ALBUTEROL SULFATE 2.5; .5 MG/3ML; MG/3ML
3 SOLUTION RESPIRATORY (INHALATION) EVERY 6 HOURS
Status: DISCONTINUED | OUTPATIENT
Start: 2021-03-06 | End: 2021-03-05

## 2021-03-05 RX ORDER — HEPARIN SODIUM 10000 [USP'U]/ML
INJECTION, SOLUTION INTRAVENOUS; SUBCUTANEOUS
Status: DISCONTINUED | OUTPATIENT
Start: 2021-03-05 | End: 2021-03-05 | Stop reason: HOSPADM

## 2021-03-05 RX ORDER — SUCCINYLCHOLINE CHLORIDE 20 MG/ML
INJECTION INTRAMUSCULAR; INTRAVENOUS
Status: DISCONTINUED | OUTPATIENT
Start: 2021-03-05 | End: 2021-03-05

## 2021-03-05 RX ORDER — KETOROLAC TROMETHAMINE 30 MG/ML
30 INJECTION, SOLUTION INTRAMUSCULAR; INTRAVENOUS ONCE
Status: COMPLETED | OUTPATIENT
Start: 2021-03-05 | End: 2021-03-05

## 2021-03-05 RX ORDER — SODIUM CHLORIDE, SODIUM LACTATE, POTASSIUM CHLORIDE, CALCIUM CHLORIDE 600; 310; 30; 20 MG/100ML; MG/100ML; MG/100ML; MG/100ML
INJECTION, SOLUTION INTRAVENOUS CONTINUOUS PRN
Status: DISCONTINUED | OUTPATIENT
Start: 2021-03-05 | End: 2021-03-05

## 2021-03-05 RX ORDER — SODIUM CHLORIDE 9 MG/ML
INJECTION, SOLUTION INTRAVENOUS CONTINUOUS PRN
Status: DISCONTINUED | OUTPATIENT
Start: 2021-03-05 | End: 2021-03-05

## 2021-03-05 RX ORDER — FENTANYL CITRATE 50 UG/ML
INJECTION, SOLUTION INTRAMUSCULAR; INTRAVENOUS
Status: DISCONTINUED | OUTPATIENT
Start: 2021-03-05 | End: 2021-03-05

## 2021-03-05 RX ORDER — MAGNESIUM SULFATE HEPTAHYDRATE 40 MG/ML
2 INJECTION, SOLUTION INTRAVENOUS
Status: DISCONTINUED | OUTPATIENT
Start: 2021-03-05 | End: 2021-03-09 | Stop reason: HOSPADM

## 2021-03-05 RX ORDER — MIDAZOLAM HYDROCHLORIDE 1 MG/ML
1 INJECTION INTRAMUSCULAR; INTRAVENOUS
Status: DISCONTINUED | OUTPATIENT
Start: 2021-03-05 | End: 2021-03-08

## 2021-03-05 RX ORDER — ONDANSETRON 2 MG/ML
4 INJECTION INTRAMUSCULAR; INTRAVENOUS EVERY 12 HOURS PRN
Status: DISCONTINUED | OUTPATIENT
Start: 2021-03-05 | End: 2021-03-09 | Stop reason: HOSPADM

## 2021-03-05 RX ORDER — CEFAZOLIN SODIUM 2 G/50ML
2 SOLUTION INTRAVENOUS
Status: COMPLETED | OUTPATIENT
Start: 2021-03-05 | End: 2021-03-06

## 2021-03-05 RX ORDER — DOCUSATE SODIUM 100 MG/1
100 CAPSULE, LIQUID FILLED ORAL 2 TIMES DAILY
Status: DISCONTINUED | OUTPATIENT
Start: 2021-03-05 | End: 2021-03-09 | Stop reason: HOSPADM

## 2021-03-05 RX ORDER — CEFAZOLIN SODIUM 1 G/3ML
INJECTION, POWDER, FOR SOLUTION INTRAMUSCULAR; INTRAVENOUS
Status: DISCONTINUED | OUTPATIENT
Start: 2021-03-05 | End: 2021-03-05 | Stop reason: HOSPADM

## 2021-03-05 RX ORDER — MIDAZOLAM HYDROCHLORIDE 1 MG/ML
INJECTION INTRAMUSCULAR; INTRAVENOUS
Status: DISCONTINUED | OUTPATIENT
Start: 2021-03-05 | End: 2021-03-05

## 2021-03-05 RX ORDER — LIDOCAINE HYDROCHLORIDE 20 MG/ML
JELLY TOPICAL
Status: DISCONTINUED | OUTPATIENT
Start: 2021-03-05 | End: 2021-03-05

## 2021-03-05 RX ORDER — ACETAMINOPHEN 10 MG/ML
1000 INJECTION, SOLUTION INTRAVENOUS ONCE
Status: COMPLETED | OUTPATIENT
Start: 2021-03-05 | End: 2021-03-05

## 2021-03-05 RX ORDER — SODIUM CHLORIDE 450 MG/100ML
INJECTION, SOLUTION INTRAVENOUS CONTINUOUS
Status: DISCONTINUED | OUTPATIENT
Start: 2021-03-05 | End: 2021-03-08

## 2021-03-05 RX ORDER — SODIUM BICARBONATE 1 MEQ/ML
100 SYRINGE (ML) INTRAVENOUS ONCE
Status: COMPLETED | OUTPATIENT
Start: 2021-03-05 | End: 2021-03-05

## 2021-03-05 RX ORDER — MAGNESIUM SULFATE HEPTAHYDRATE 40 MG/ML
4 INJECTION, SOLUTION INTRAVENOUS
Status: DISCONTINUED | OUTPATIENT
Start: 2021-03-05 | End: 2021-03-09 | Stop reason: HOSPADM

## 2021-03-05 RX ORDER — LIDOCAINE HCL/PF 100 MG/5ML
SYRINGE (ML) INTRAVENOUS
Status: DISCONTINUED | OUTPATIENT
Start: 2021-03-05 | End: 2021-03-05

## 2021-03-05 RX ORDER — ACETAMINOPHEN 325 MG/1
650 TABLET ORAL EVERY 4 HOURS PRN
Status: DISCONTINUED | OUTPATIENT
Start: 2021-03-05 | End: 2021-03-09 | Stop reason: HOSPADM

## 2021-03-05 RX ORDER — POTASSIUM CHLORIDE 29.8 MG/ML
80 INJECTION INTRAVENOUS
Status: DISCONTINUED | OUTPATIENT
Start: 2021-03-05 | End: 2021-03-09 | Stop reason: HOSPADM

## 2021-03-05 RX ORDER — MORPHINE SULFATE 2 MG/ML
INJECTION, SOLUTION INTRAMUSCULAR; INTRAVENOUS
Status: DISPENSED
Start: 2021-03-05 | End: 2021-03-05

## 2021-03-05 RX ORDER — POTASSIUM CHLORIDE 29.8 MG/ML
40 INJECTION INTRAVENOUS
Status: DISCONTINUED | OUTPATIENT
Start: 2021-03-05 | End: 2021-03-09 | Stop reason: HOSPADM

## 2021-03-05 RX ORDER — OXYCODONE HYDROCHLORIDE 5 MG/1
5 TABLET ORAL EVERY 4 HOURS PRN
Status: DISCONTINUED | OUTPATIENT
Start: 2021-03-05 | End: 2021-03-09 | Stop reason: HOSPADM

## 2021-03-05 RX ORDER — PANTOPRAZOLE SODIUM 40 MG/1
40 TABLET, DELAYED RELEASE ORAL
Status: DISCONTINUED | OUTPATIENT
Start: 2021-03-05 | End: 2021-03-09 | Stop reason: HOSPADM

## 2021-03-05 RX ORDER — POTASSIUM CHLORIDE 14.9 MG/ML
60 INJECTION INTRAVENOUS
Status: DISCONTINUED | OUTPATIENT
Start: 2021-03-05 | End: 2021-03-09 | Stop reason: HOSPADM

## 2021-03-05 RX ORDER — VECURONIUM BROMIDE FOR INJECTION 1 MG/ML
INJECTION, POWDER, LYOPHILIZED, FOR SOLUTION INTRAVENOUS
Status: DISCONTINUED | OUTPATIENT
Start: 2021-03-05 | End: 2021-03-05

## 2021-03-05 RX ORDER — IPRATROPIUM BROMIDE AND ALBUTEROL SULFATE 2.5; .5 MG/3ML; MG/3ML
3 SOLUTION RESPIRATORY (INHALATION)
Status: DISCONTINUED | OUTPATIENT
Start: 2021-03-06 | End: 2021-03-09 | Stop reason: HOSPADM

## 2021-03-05 RX ORDER — ETOMIDATE 2 MG/ML
INJECTION INTRAVENOUS
Status: DISCONTINUED | OUTPATIENT
Start: 2021-03-05 | End: 2021-03-05

## 2021-03-05 RX ORDER — ASPIRIN 81 MG/1
81 TABLET ORAL DAILY
Status: DISCONTINUED | OUTPATIENT
Start: 2021-03-05 | End: 2021-03-09 | Stop reason: HOSPADM

## 2021-03-05 RX ORDER — SODIUM BICARBONATE 1 MEQ/ML
50 SYRINGE (ML) INTRAVENOUS ONCE
Status: COMPLETED | OUTPATIENT
Start: 2021-03-05 | End: 2021-03-05

## 2021-03-05 RX ORDER — IPRATROPIUM BROMIDE AND ALBUTEROL SULFATE 2.5; .5 MG/3ML; MG/3ML
3 SOLUTION RESPIRATORY (INHALATION) EVERY 4 HOURS
Status: DISCONTINUED | OUTPATIENT
Start: 2021-03-05 | End: 2021-03-05

## 2021-03-05 RX ADMIN — SODIUM CHLORIDE: 0.9 INJECTION, SOLUTION INTRAVENOUS at 09:03

## 2021-03-05 RX ADMIN — MIDAZOLAM HYDROCHLORIDE 3 MG: 1 INJECTION, SOLUTION INTRAMUSCULAR; INTRAVENOUS at 09:03

## 2021-03-05 RX ADMIN — CLEVIPIDINE 7 MG/HR: 0.5 EMULSION INTRAVENOUS at 03:03

## 2021-03-05 RX ADMIN — FENTANYL CITRATE 250 MCG: 50 INJECTION INTRAMUSCULAR; INTRAVENOUS at 09:03

## 2021-03-05 RX ADMIN — MORPHINE SULFATE 15 MG: 15 TABLET ORAL at 09:03

## 2021-03-05 RX ADMIN — MORPHINE SULFATE 2 MG: 2 INJECTION, SOLUTION INTRAMUSCULAR; INTRAVENOUS at 01:03

## 2021-03-05 RX ADMIN — MORPHINE SULFATE 2 MG: 2 INJECTION, SOLUTION INTRAMUSCULAR; INTRAVENOUS at 11:03

## 2021-03-05 RX ADMIN — Medication 100 MEQ: at 03:03

## 2021-03-05 RX ADMIN — FENTANYL CITRATE 500 MCG: 50 INJECTION INTRAMUSCULAR; INTRAVENOUS at 07:03

## 2021-03-05 RX ADMIN — VECURONIUM BROMIDE 4 MG: 1 INJECTION, POWDER, LYOPHILIZED, FOR SOLUTION INTRAVENOUS at 09:03

## 2021-03-05 RX ADMIN — FENTANYL CITRATE 250 MCG: 50 INJECTION INTRAMUSCULAR; INTRAVENOUS at 08:03

## 2021-03-05 RX ADMIN — FENTANYL CITRATE 250 MCG: 50 INJECTION INTRAMUSCULAR; INTRAVENOUS at 10:03

## 2021-03-05 RX ADMIN — PANTOPRAZOLE SODIUM 40 MG: 40 TABLET, DELAYED RELEASE ORAL at 05:03

## 2021-03-05 RX ADMIN — ROCURONIUM BROMIDE 10 MG: 10 INJECTION, SOLUTION INTRAVENOUS at 07:03

## 2021-03-05 RX ADMIN — SODIUM CHLORIDE, SODIUM LACTATE, POTASSIUM CHLORIDE, AND CALCIUM CHLORIDE: .6; .31; .03; .02 INJECTION, SOLUTION INTRAVENOUS at 06:03

## 2021-03-05 RX ADMIN — MIDAZOLAM HYDROCHLORIDE 5 MG: 1 INJECTION, SOLUTION INTRAMUSCULAR; INTRAVENOUS at 10:03

## 2021-03-05 RX ADMIN — SODIUM CHLORIDE: 0.45 INJECTION, SOLUTION INTRAVENOUS at 12:03

## 2021-03-05 RX ADMIN — ETOMIDATE 14 MG: 2 INJECTION, SOLUTION INTRAVENOUS at 07:03

## 2021-03-05 RX ADMIN — Medication 100 MEQ: at 02:03

## 2021-03-05 RX ADMIN — FENTANYL CITRATE 100 MCG: 50 INJECTION INTRAMUSCULAR; INTRAVENOUS at 06:03

## 2021-03-05 RX ADMIN — VECURONIUM BROMIDE 6 MG: 1 INJECTION, POWDER, LYOPHILIZED, FOR SOLUTION INTRAVENOUS at 08:03

## 2021-03-05 RX ADMIN — SODIUM BICARBONATE 100 MEQ: 84 INJECTION, SOLUTION INTRAVENOUS at 03:03

## 2021-03-05 RX ADMIN — MIDAZOLAM HYDROCHLORIDE 2 MG: 1 INJECTION, SOLUTION INTRAMUSCULAR; INTRAVENOUS at 07:03

## 2021-03-05 RX ADMIN — MORPHINE SULFATE 2 MG: 2 INJECTION, SOLUTION INTRAMUSCULAR; INTRAVENOUS at 08:03

## 2021-03-05 RX ADMIN — IPRATROPIUM BROMIDE AND ALBUTEROL SULFATE 3 ML: .5; 3 SOLUTION RESPIRATORY (INHALATION) at 11:03

## 2021-03-05 RX ADMIN — MORPHINE SULFATE 15 MG: 15 TABLET ORAL at 03:03

## 2021-03-05 RX ADMIN — MIDAZOLAM HYDROCHLORIDE 3 MG: 1 INJECTION, SOLUTION INTRAMUSCULAR; INTRAVENOUS at 07:03

## 2021-03-05 RX ADMIN — MORPHINE SULFATE 15 MG: 15 TABLET ORAL at 02:03

## 2021-03-05 RX ADMIN — SUCCINYLCHOLINE CHLORIDE 160 MG: 20 INJECTION, SOLUTION INTRAMUSCULAR; INTRAVENOUS at 07:03

## 2021-03-05 RX ADMIN — CEFAZOLIN SODIUM 2 G: 2 SOLUTION INTRAVENOUS at 07:03

## 2021-03-05 RX ADMIN — SODIUM BICARBONATE 50 MEQ: 84 INJECTION, SOLUTION INTRAVENOUS at 03:03

## 2021-03-05 RX ADMIN — CALCIUM GLUCONATE 1 G: 98 INJECTION, SOLUTION INTRAVENOUS at 03:03

## 2021-03-05 RX ADMIN — SODIUM CHLORIDE: 0.9 INJECTION, SOLUTION INTRAVENOUS at 08:03

## 2021-03-05 RX ADMIN — CLEVIPIDINE 7 MG/HR: 0.5 EMULSION INTRAVENOUS at 12:03

## 2021-03-05 RX ADMIN — MIDAZOLAM HYDROCHLORIDE 2 MG: 1 INJECTION, SOLUTION INTRAMUSCULAR; INTRAVENOUS at 06:03

## 2021-03-05 RX ADMIN — SODIUM CHLORIDE: 0.9 INJECTION, SOLUTION INTRAVENOUS at 07:03

## 2021-03-05 RX ADMIN — CEFAZOLIN SODIUM 2 G: 2 SOLUTION INTRAVENOUS at 08:03

## 2021-03-05 RX ADMIN — INSULIN HUMAN 2 UNITS/HR: 1 INJECTION, SOLUTION INTRAVENOUS at 02:03

## 2021-03-05 RX ADMIN — POTASSIUM CHLORIDE 40 MEQ: 14.9 INJECTION, SOLUTION INTRAVENOUS at 03:03

## 2021-03-05 RX ADMIN — FENTANYL CITRATE 400 MCG: 50 INJECTION INTRAMUSCULAR; INTRAVENOUS at 07:03

## 2021-03-05 RX ADMIN — SODIUM BICARBONATE 100 MEQ: 84 INJECTION, SOLUTION INTRAVENOUS at 02:03

## 2021-03-05 RX ADMIN — IPRATROPIUM BROMIDE AND ALBUTEROL SULFATE 3 ML: .5; 3 SOLUTION RESPIRATORY (INHALATION) at 07:03

## 2021-03-05 RX ADMIN — LIDOCAINE HYDROCHLORIDE 3 ML: 20 JELLY TOPICAL at 07:03

## 2021-03-05 RX ADMIN — KETOROLAC TROMETHAMINE 30 MG: 30 INJECTION, SOLUTION INTRAMUSCULAR at 03:03

## 2021-03-05 RX ADMIN — CLEVIPIDINE 6 MG/HR: 0.5 EMULSION INTRAVENOUS at 01:03

## 2021-03-05 RX ADMIN — LIDOCAINE HYDROCHLORIDE 100 MG: 20 INJECTION, SOLUTION INTRAVENOUS at 07:03

## 2021-03-05 RX ADMIN — HEPARIN SODIUM 20000 UNITS: 10000 INJECTION, SOLUTION INTRAVENOUS; SUBCUTANEOUS at 08:03

## 2021-03-05 RX ADMIN — HEPARIN SODIUM 5000 UNITS: 10000 INJECTION, SOLUTION INTRAVENOUS; SUBCUTANEOUS at 08:03

## 2021-03-05 RX ADMIN — ROCURONIUM BROMIDE 40 MG: 10 INJECTION, SOLUTION INTRAVENOUS at 07:03

## 2021-03-05 RX ADMIN — MAGNESIUM SULFATE IN WATER 2 G: 40 INJECTION, SOLUTION INTRAVENOUS at 12:03

## 2021-03-05 RX ADMIN — ACETAMINOPHEN 1000 MG: 10 INJECTION, SOLUTION INTRAVENOUS at 09:03

## 2021-03-05 RX ADMIN — PROTAMINE SULFATE 200 MG: 10 INJECTION, SOLUTION INTRAVENOUS at 09:03

## 2021-03-06 LAB
ANION GAP SERPL CALC-SCNC: 6 MMOL/L (ref 8–16)
BASOPHILS # BLD AUTO: 0.04 K/UL (ref 0–0.2)
BASOPHILS NFR BLD: 0.3 % (ref 0–1.9)
BUN SERPL-MCNC: 19 MG/DL (ref 6–20)
CALCIUM SERPL-MCNC: 7.8 MG/DL (ref 8.7–10.5)
CHLORIDE SERPL-SCNC: 105 MMOL/L (ref 95–110)
CO2 SERPL-SCNC: 25 MMOL/L (ref 23–29)
CREAT SERPL-MCNC: 1.2 MG/DL (ref 0.5–1.4)
DIFFERENTIAL METHOD: ABNORMAL
EOSINOPHIL # BLD AUTO: 0 K/UL (ref 0–0.5)
EOSINOPHIL NFR BLD: 0 % (ref 0–8)
ERYTHROCYTE [DISTWIDTH] IN BLOOD BY AUTOMATED COUNT: 13 % (ref 11.5–14.5)
EST. GFR  (AFRICAN AMERICAN): >60 ML/MIN/1.73 M^2
EST. GFR  (NON AFRICAN AMERICAN): >60 ML/MIN/1.73 M^2
GLUCOSE SERPL-MCNC: 121 MG/DL (ref 70–110)
GLUCOSE SERPL-MCNC: 133 MG/DL (ref 70–110)
HCT VFR BLD AUTO: 36 % (ref 40–54)
HGB BLD-MCNC: 11.8 G/DL (ref 14–18)
IMM GRANULOCYTES # BLD AUTO: 0.04 K/UL (ref 0–0.04)
IMM GRANULOCYTES NFR BLD AUTO: 0.3 % (ref 0–0.5)
LYMPHOCYTES # BLD AUTO: 1.2 K/UL (ref 1–4.8)
LYMPHOCYTES NFR BLD: 9.1 % (ref 18–48)
MAGNESIUM SERPL-MCNC: 1.9 MG/DL (ref 1.6–2.6)
MCH RBC QN AUTO: 28.6 PG (ref 27–31)
MCHC RBC AUTO-ENTMCNC: 32.8 G/DL (ref 32–36)
MCV RBC AUTO: 87 FL (ref 82–98)
MONOCYTES # BLD AUTO: 1.4 K/UL (ref 0.3–1)
MONOCYTES NFR BLD: 11 % (ref 4–15)
NEUTROPHILS # BLD AUTO: 10.2 K/UL (ref 1.8–7.7)
NEUTROPHILS NFR BLD: 79.3 % (ref 38–73)
NRBC BLD-RTO: 0 /100 WBC
PLATELET # BLD AUTO: 170 K/UL (ref 150–350)
PMV BLD AUTO: 11.5 FL (ref 9.2–12.9)
POTASSIUM SERPL-SCNC: 4.4 MMOL/L (ref 3.5–5.1)
RBC # BLD AUTO: 4.13 M/UL (ref 4.6–6.2)
SODIUM SERPL-SCNC: 136 MMOL/L (ref 136–145)
WBC # BLD AUTO: 12.85 K/UL (ref 3.9–12.7)

## 2021-03-06 PROCEDURE — 85025 COMPLETE CBC W/AUTO DIFF WBC: CPT | Performed by: THORACIC SURGERY (CARDIOTHORACIC VASCULAR SURGERY)

## 2021-03-06 PROCEDURE — 99900035 HC TECH TIME PER 15 MIN (STAT)

## 2021-03-06 PROCEDURE — 80048 BASIC METABOLIC PNL TOTAL CA: CPT | Performed by: THORACIC SURGERY (CARDIOTHORACIC VASCULAR SURGERY)

## 2021-03-06 PROCEDURE — 94799 UNLISTED PULMONARY SVC/PX: CPT

## 2021-03-06 PROCEDURE — 94640 AIRWAY INHALATION TREATMENT: CPT

## 2021-03-06 PROCEDURE — 82962 GLUCOSE BLOOD TEST: CPT

## 2021-03-06 PROCEDURE — 27000221 HC OXYGEN, UP TO 24 HOURS

## 2021-03-06 PROCEDURE — 25000003 PHARM REV CODE 250: Performed by: THORACIC SURGERY (CARDIOTHORACIC VASCULAR SURGERY)

## 2021-03-06 PROCEDURE — 63600175 PHARM REV CODE 636 W HCPCS: Performed by: THORACIC SURGERY (CARDIOTHORACIC VASCULAR SURGERY)

## 2021-03-06 PROCEDURE — 36415 COLL VENOUS BLD VENIPUNCTURE: CPT | Performed by: THORACIC SURGERY (CARDIOTHORACIC VASCULAR SURGERY)

## 2021-03-06 PROCEDURE — 99900031 HC PATIENT EDUCATION (STAT)

## 2021-03-06 PROCEDURE — 25000003 PHARM REV CODE 250: Performed by: INTERNAL MEDICINE

## 2021-03-06 PROCEDURE — 25000242 PHARM REV CODE 250 ALT 637 W/ HCPCS: Performed by: INTERNAL MEDICINE

## 2021-03-06 PROCEDURE — 20000000 HC ICU ROOM

## 2021-03-06 PROCEDURE — 83735 ASSAY OF MAGNESIUM: CPT | Performed by: THORACIC SURGERY (CARDIOTHORACIC VASCULAR SURGERY)

## 2021-03-06 PROCEDURE — 99232 SBSQ HOSP IP/OBS MODERATE 35: CPT | Mod: ,,, | Performed by: INTERNAL MEDICINE

## 2021-03-06 PROCEDURE — 25000003 PHARM REV CODE 250

## 2021-03-06 PROCEDURE — 94761 N-INVAS EAR/PLS OXIMETRY MLT: CPT

## 2021-03-06 PROCEDURE — 99232 PR SUBSEQUENT HOSPITAL CARE,LEVL II: ICD-10-PCS | Mod: ,,, | Performed by: INTERNAL MEDICINE

## 2021-03-06 RX ORDER — METOPROLOL TARTRATE 25 MG/1
25 TABLET, FILM COATED ORAL 2 TIMES DAILY
Status: DISCONTINUED | OUTPATIENT
Start: 2021-03-06 | End: 2021-03-07

## 2021-03-06 RX ORDER — FENTANYL 100 UG/H
1 PATCH TRANSDERMAL
Status: DISCONTINUED | OUTPATIENT
Start: 2021-03-06 | End: 2021-03-07

## 2021-03-06 RX ORDER — METOPROLOL TARTRATE 25 MG/1
25 TABLET, FILM COATED ORAL 2 TIMES DAILY
Status: DISCONTINUED | OUTPATIENT
Start: 2021-03-06 | End: 2021-03-06

## 2021-03-06 RX ORDER — TAMSULOSIN HYDROCHLORIDE 0.4 MG/1
0.4 CAPSULE ORAL NIGHTLY
Status: DISCONTINUED | OUTPATIENT
Start: 2021-03-06 | End: 2021-03-09 | Stop reason: HOSPADM

## 2021-03-06 RX ORDER — CHLORHEXIDINE GLUCONATE ORAL RINSE 1.2 MG/ML
15 SOLUTION DENTAL 2 TIMES DAILY
Status: DISCONTINUED | OUTPATIENT
Start: 2021-03-06 | End: 2021-03-08

## 2021-03-06 RX ORDER — MUPIROCIN 20 MG/G
OINTMENT TOPICAL 2 TIMES DAILY
Status: DISCONTINUED | OUTPATIENT
Start: 2021-03-06 | End: 2021-03-09 | Stop reason: HOSPADM

## 2021-03-06 RX ORDER — LIDOCAINE 50 MG/G
2 PATCH TOPICAL
Status: DISCONTINUED | OUTPATIENT
Start: 2021-03-06 | End: 2021-03-09 | Stop reason: HOSPADM

## 2021-03-06 RX ADMIN — CHLORHEXIDINE GLUCONATE 15 ML: 1.2 RINSE ORAL at 09:03

## 2021-03-06 RX ADMIN — CEFAZOLIN SODIUM 2 G: 2 SOLUTION INTRAVENOUS at 04:03

## 2021-03-06 RX ADMIN — IPRATROPIUM BROMIDE AND ALBUTEROL SULFATE 3 ML: .5; 3 SOLUTION RESPIRATORY (INHALATION) at 02:03

## 2021-03-06 RX ADMIN — PANTOPRAZOLE SODIUM 40 MG: 40 TABLET, DELAYED RELEASE ORAL at 05:03

## 2021-03-06 RX ADMIN — ASPIRIN 81 MG: 81 TABLET, DELAYED RELEASE ORAL at 08:03

## 2021-03-06 RX ADMIN — MORPHINE SULFATE 15 MG: 15 TABLET ORAL at 05:03

## 2021-03-06 RX ADMIN — DOCUSATE SODIUM 100 MG: 100 CAPSULE, LIQUID FILLED ORAL at 08:03

## 2021-03-06 RX ADMIN — LIDOCAINE 2 PATCH: 50 PATCH TOPICAL at 03:03

## 2021-03-06 RX ADMIN — OXYCODONE HYDROCHLORIDE 5 MG: 5 TABLET ORAL at 08:03

## 2021-03-06 RX ADMIN — ACETAMINOPHEN 650 MG: 325 TABLET, FILM COATED ORAL at 07:03

## 2021-03-06 RX ADMIN — CEFAZOLIN SODIUM 2 G: 2 SOLUTION INTRAVENOUS at 01:03

## 2021-03-06 RX ADMIN — METOPROLOL TARTRATE 25 MG: 25 TABLET, FILM COATED ORAL at 01:03

## 2021-03-06 RX ADMIN — METOPROLOL TARTRATE 25 MG: 25 TABLET, FILM COATED ORAL at 09:03

## 2021-03-06 RX ADMIN — MORPHINE SULFATE 15 MG: 15 TABLET ORAL at 01:03

## 2021-03-06 RX ADMIN — OXYCODONE HYDROCHLORIDE 5 MG: 5 TABLET ORAL at 03:03

## 2021-03-06 RX ADMIN — MORPHINE SULFATE 2 MG: 2 INJECTION, SOLUTION INTRAMUSCULAR; INTRAVENOUS at 05:03

## 2021-03-06 RX ADMIN — MUPIROCIN: 20 OINTMENT TOPICAL at 09:03

## 2021-03-06 RX ADMIN — OXYCODONE HYDROCHLORIDE 5 MG: 5 TABLET ORAL at 12:03

## 2021-03-06 RX ADMIN — OXYCODONE HYDROCHLORIDE 5 MG: 5 TABLET ORAL at 07:03

## 2021-03-06 RX ADMIN — MORPHINE SULFATE 15 MG: 15 TABLET ORAL at 09:03

## 2021-03-06 RX ADMIN — DOCUSATE SODIUM 100 MG: 100 CAPSULE, LIQUID FILLED ORAL at 12:03

## 2021-03-06 RX ADMIN — IPRATROPIUM BROMIDE AND ALBUTEROL SULFATE 3 ML: .5; 3 SOLUTION RESPIRATORY (INHALATION) at 08:03

## 2021-03-06 RX ADMIN — MORPHINE SULFATE 2 MG: 2 INJECTION, SOLUTION INTRAMUSCULAR; INTRAVENOUS at 03:03

## 2021-03-06 RX ADMIN — DOCUSATE SODIUM 100 MG: 100 CAPSULE, LIQUID FILLED ORAL at 09:03

## 2021-03-06 RX ADMIN — TAMSULOSIN HYDROCHLORIDE 0.4 MG: 0.4 CAPSULE ORAL at 09:03

## 2021-03-06 RX ADMIN — MUPIROCIN: 20 OINTMENT TOPICAL at 12:03

## 2021-03-06 RX ADMIN — FENTANYL TRANSDERMAL 1 PATCH: 100 PATCH, EXTENDED RELEASE TRANSDERMAL at 05:03

## 2021-03-06 RX ADMIN — MORPHINE SULFATE 2 MG: 2 INJECTION, SOLUTION INTRAMUSCULAR; INTRAVENOUS at 12:03

## 2021-03-06 RX ADMIN — IPRATROPIUM BROMIDE AND ALBUTEROL SULFATE 3 ML: .5; 3 SOLUTION RESPIRATORY (INHALATION) at 07:03

## 2021-03-06 RX ADMIN — CHLORHEXIDINE GLUCONATE 15 ML: 1.2 RINSE ORAL at 12:03

## 2021-03-07 LAB
ANION GAP SERPL CALC-SCNC: 6 MMOL/L (ref 8–16)
BASOPHILS # BLD AUTO: 0.04 K/UL (ref 0–0.2)
BASOPHILS NFR BLD: 0.3 % (ref 0–1.9)
BUN SERPL-MCNC: 19 MG/DL (ref 6–20)
CALCIUM SERPL-MCNC: 8.6 MG/DL (ref 8.7–10.5)
CHLORIDE SERPL-SCNC: 103 MMOL/L (ref 95–110)
CO2 SERPL-SCNC: 24 MMOL/L (ref 23–29)
CREAT SERPL-MCNC: 1 MG/DL (ref 0.5–1.4)
DIFFERENTIAL METHOD: ABNORMAL
EOSINOPHIL # BLD AUTO: 0 K/UL (ref 0–0.5)
EOSINOPHIL NFR BLD: 0.1 % (ref 0–8)
ERYTHROCYTE [DISTWIDTH] IN BLOOD BY AUTOMATED COUNT: 13 % (ref 11.5–14.5)
EST. GFR  (AFRICAN AMERICAN): >60 ML/MIN/1.73 M^2
EST. GFR  (NON AFRICAN AMERICAN): >60 ML/MIN/1.73 M^2
GLUCOSE SERPL-MCNC: 138 MG/DL (ref 70–110)
HCT VFR BLD AUTO: 35 % (ref 40–54)
HGB BLD-MCNC: 11.5 G/DL (ref 14–18)
IMM GRANULOCYTES # BLD AUTO: 0.08 K/UL (ref 0–0.04)
IMM GRANULOCYTES NFR BLD AUTO: 0.5 % (ref 0–0.5)
LYMPHOCYTES # BLD AUTO: 1.3 K/UL (ref 1–4.8)
LYMPHOCYTES NFR BLD: 8.4 % (ref 18–48)
MCH RBC QN AUTO: 28.6 PG (ref 27–31)
MCHC RBC AUTO-ENTMCNC: 32.9 G/DL (ref 32–36)
MCV RBC AUTO: 87 FL (ref 82–98)
MONOCYTES # BLD AUTO: 1.5 K/UL (ref 0.3–1)
MONOCYTES NFR BLD: 9.6 % (ref 4–15)
NEUTROPHILS # BLD AUTO: 12.8 K/UL (ref 1.8–7.7)
NEUTROPHILS NFR BLD: 81.1 % (ref 38–73)
NRBC BLD-RTO: 0 /100 WBC
PLATELET # BLD AUTO: 174 K/UL (ref 150–350)
PMV BLD AUTO: 11.5 FL (ref 9.2–12.9)
POTASSIUM SERPL-SCNC: 4.6 MMOL/L (ref 3.5–5.1)
RBC # BLD AUTO: 4.02 M/UL (ref 4.6–6.2)
SODIUM SERPL-SCNC: 133 MMOL/L (ref 136–145)
WBC # BLD AUTO: 15.82 K/UL (ref 3.9–12.7)

## 2021-03-07 PROCEDURE — 85025 COMPLETE CBC W/AUTO DIFF WBC: CPT | Performed by: THORACIC SURGERY (CARDIOTHORACIC VASCULAR SURGERY)

## 2021-03-07 PROCEDURE — 99233 SBSQ HOSP IP/OBS HIGH 50: CPT | Mod: ,,, | Performed by: INTERNAL MEDICINE

## 2021-03-07 PROCEDURE — 20000000 HC ICU ROOM

## 2021-03-07 PROCEDURE — 63600175 PHARM REV CODE 636 W HCPCS: Performed by: THORACIC SURGERY (CARDIOTHORACIC VASCULAR SURGERY)

## 2021-03-07 PROCEDURE — 94761 N-INVAS EAR/PLS OXIMETRY MLT: CPT

## 2021-03-07 PROCEDURE — 80048 BASIC METABOLIC PNL TOTAL CA: CPT | Performed by: THORACIC SURGERY (CARDIOTHORACIC VASCULAR SURGERY)

## 2021-03-07 PROCEDURE — 94799 UNLISTED PULMONARY SVC/PX: CPT

## 2021-03-07 PROCEDURE — 99900035 HC TECH TIME PER 15 MIN (STAT)

## 2021-03-07 PROCEDURE — 25000003 PHARM REV CODE 250: Performed by: NURSE PRACTITIONER

## 2021-03-07 PROCEDURE — 99900031 HC PATIENT EDUCATION (STAT)

## 2021-03-07 PROCEDURE — 94640 AIRWAY INHALATION TREATMENT: CPT

## 2021-03-07 PROCEDURE — 25000003 PHARM REV CODE 250

## 2021-03-07 PROCEDURE — 25000003 PHARM REV CODE 250: Performed by: THORACIC SURGERY (CARDIOTHORACIC VASCULAR SURGERY)

## 2021-03-07 PROCEDURE — 99233 PR SUBSEQUENT HOSPITAL CARE,LEVL III: ICD-10-PCS | Mod: ,,, | Performed by: INTERNAL MEDICINE

## 2021-03-07 PROCEDURE — 25000242 PHARM REV CODE 250 ALT 637 W/ HCPCS: Performed by: INTERNAL MEDICINE

## 2021-03-07 RX ORDER — METOPROLOL TARTRATE 25 MG/1
25 TABLET, FILM COATED ORAL 3 TIMES DAILY
Status: DISCONTINUED | OUTPATIENT
Start: 2021-03-07 | End: 2021-03-08

## 2021-03-07 RX ORDER — LANOLIN ALCOHOL/MO/W.PET/CERES
400 CREAM (GRAM) TOPICAL DAILY
Status: DISCONTINUED | OUTPATIENT
Start: 2021-03-08 | End: 2021-03-09 | Stop reason: HOSPADM

## 2021-03-07 RX ORDER — METOPROLOL TARTRATE 25 MG/1
25 TABLET, FILM COATED ORAL ONCE
Status: COMPLETED | OUTPATIENT
Start: 2021-03-07 | End: 2021-03-07

## 2021-03-07 RX ADMIN — OXYCODONE HYDROCHLORIDE 5 MG: 5 TABLET ORAL at 09:03

## 2021-03-07 RX ADMIN — METOPROLOL TARTRATE 25 MG: 25 TABLET, FILM COATED ORAL at 08:03

## 2021-03-07 RX ADMIN — MORPHINE SULFATE 2 MG: 2 INJECTION, SOLUTION INTRAMUSCULAR; INTRAVENOUS at 03:03

## 2021-03-07 RX ADMIN — CHLORHEXIDINE GLUCONATE 15 ML: 1.2 RINSE ORAL at 08:03

## 2021-03-07 RX ADMIN — PANTOPRAZOLE SODIUM 40 MG: 40 TABLET, DELAYED RELEASE ORAL at 05:03

## 2021-03-07 RX ADMIN — MUPIROCIN: 20 OINTMENT TOPICAL at 08:03

## 2021-03-07 RX ADMIN — ASPIRIN 81 MG: 81 TABLET, DELAYED RELEASE ORAL at 08:03

## 2021-03-07 RX ADMIN — MORPHINE SULFATE 15 MG: 15 TABLET ORAL at 05:03

## 2021-03-07 RX ADMIN — IPRATROPIUM BROMIDE AND ALBUTEROL SULFATE 3 ML: .5; 3 SOLUTION RESPIRATORY (INHALATION) at 12:03

## 2021-03-07 RX ADMIN — OXYCODONE HYDROCHLORIDE 5 MG: 5 TABLET ORAL at 04:03

## 2021-03-07 RX ADMIN — DOCUSATE SODIUM 100 MG: 100 CAPSULE, LIQUID FILLED ORAL at 08:03

## 2021-03-07 RX ADMIN — IPRATROPIUM BROMIDE AND ALBUTEROL SULFATE 3 ML: .5; 3 SOLUTION RESPIRATORY (INHALATION) at 07:03

## 2021-03-07 RX ADMIN — MORPHINE SULFATE 15 MG: 15 TABLET ORAL at 09:03

## 2021-03-07 RX ADMIN — ACETAMINOPHEN 650 MG: 325 TABLET, FILM COATED ORAL at 06:03

## 2021-03-07 RX ADMIN — OXYCODONE HYDROCHLORIDE 5 MG: 5 TABLET ORAL at 08:03

## 2021-03-07 RX ADMIN — OXYCODONE HYDROCHLORIDE 5 MG: 5 TABLET ORAL at 01:03

## 2021-03-07 RX ADMIN — ACETAMINOPHEN 650 MG: 325 TABLET, FILM COATED ORAL at 08:03

## 2021-03-07 RX ADMIN — TAMSULOSIN HYDROCHLORIDE 0.4 MG: 0.4 CAPSULE ORAL at 08:03

## 2021-03-07 RX ADMIN — METOPROLOL TARTRATE 25 MG: 25 TABLET, FILM COATED ORAL at 04:03

## 2021-03-08 PROBLEM — D64.9 POSTOPERATIVE ANEMIA: Status: ACTIVE | Noted: 2021-03-08

## 2021-03-08 PROBLEM — R07.9 CHEST PAIN: Status: RESOLVED | Noted: 2021-03-03 | Resolved: 2021-03-08

## 2021-03-08 LAB
ALBUMIN SERPL BCP-MCNC: 3.1 G/DL (ref 3.5–5.2)
ALP SERPL-CCNC: 36 U/L (ref 55–135)
ALT SERPL W/O P-5'-P-CCNC: 15 U/L (ref 10–44)
ANION GAP SERPL CALC-SCNC: 8 MMOL/L (ref 8–16)
AST SERPL-CCNC: 20 U/L (ref 10–40)
BILIRUB SERPL-MCNC: 0.9 MG/DL (ref 0.1–1)
BUN SERPL-MCNC: 18 MG/DL (ref 6–20)
CALCIUM SERPL-MCNC: 8.3 MG/DL (ref 8.7–10.5)
CHLORIDE SERPL-SCNC: 104 MMOL/L (ref 95–110)
CO2 SERPL-SCNC: 25 MMOL/L (ref 23–29)
CREAT SERPL-MCNC: 0.9 MG/DL (ref 0.5–1.4)
ERYTHROCYTE [DISTWIDTH] IN BLOOD BY AUTOMATED COUNT: 13 % (ref 11.5–14.5)
EST. GFR  (AFRICAN AMERICAN): >60 ML/MIN/1.73 M^2
EST. GFR  (NON AFRICAN AMERICAN): >60 ML/MIN/1.73 M^2
GLUCOSE SERPL-MCNC: 118 MG/DL (ref 70–110)
HCT VFR BLD AUTO: 30.2 % (ref 40–54)
HGB BLD-MCNC: 9.9 G/DL (ref 14–18)
MAGNESIUM SERPL-MCNC: 2 MG/DL (ref 1.6–2.6)
MCH RBC QN AUTO: 28.8 PG (ref 27–31)
MCHC RBC AUTO-ENTMCNC: 32.8 G/DL (ref 32–36)
MCV RBC AUTO: 88 FL (ref 82–98)
PLATELET # BLD AUTO: 140 K/UL (ref 150–350)
PMV BLD AUTO: 11 FL (ref 9.2–12.9)
POTASSIUM SERPL-SCNC: 4.3 MMOL/L (ref 3.5–5.1)
PROT SERPL-MCNC: 5.7 G/DL (ref 6–8.4)
RBC # BLD AUTO: 3.44 M/UL (ref 4.6–6.2)
SODIUM SERPL-SCNC: 137 MMOL/L (ref 136–145)
WBC # BLD AUTO: 9.69 K/UL (ref 3.9–12.7)

## 2021-03-08 PROCEDURE — 99900031 HC PATIENT EDUCATION (STAT)

## 2021-03-08 PROCEDURE — 25000003 PHARM REV CODE 250

## 2021-03-08 PROCEDURE — 25000242 PHARM REV CODE 250 ALT 637 W/ HCPCS: Performed by: INTERNAL MEDICINE

## 2021-03-08 PROCEDURE — 94640 AIRWAY INHALATION TREATMENT: CPT

## 2021-03-08 PROCEDURE — 21000000 HC CCU ICU ROOM CHARGE

## 2021-03-08 PROCEDURE — 99232 SBSQ HOSP IP/OBS MODERATE 35: CPT | Mod: ,,, | Performed by: INTERNAL MEDICINE

## 2021-03-08 PROCEDURE — 94761 N-INVAS EAR/PLS OXIMETRY MLT: CPT

## 2021-03-08 PROCEDURE — 94799 UNLISTED PULMONARY SVC/PX: CPT

## 2021-03-08 PROCEDURE — 25000003 PHARM REV CODE 250: Performed by: INTERNAL MEDICINE

## 2021-03-08 PROCEDURE — 99232 PR SUBSEQUENT HOSPITAL CARE,LEVL II: ICD-10-PCS | Mod: ,,, | Performed by: INTERNAL MEDICINE

## 2021-03-08 PROCEDURE — 83735 ASSAY OF MAGNESIUM: CPT | Performed by: INTERNAL MEDICINE

## 2021-03-08 PROCEDURE — 80053 COMPREHEN METABOLIC PANEL: CPT | Performed by: NURSE PRACTITIONER

## 2021-03-08 PROCEDURE — 25000003 PHARM REV CODE 250: Performed by: NURSE PRACTITIONER

## 2021-03-08 PROCEDURE — 25000003 PHARM REV CODE 250: Performed by: THORACIC SURGERY (CARDIOTHORACIC VASCULAR SURGERY)

## 2021-03-08 PROCEDURE — 85027 COMPLETE CBC AUTOMATED: CPT | Performed by: INTERNAL MEDICINE

## 2021-03-08 PROCEDURE — 99900035 HC TECH TIME PER 15 MIN (STAT)

## 2021-03-08 RX ORDER — METOPROLOL TARTRATE 50 MG/1
50 TABLET ORAL 2 TIMES DAILY
Status: DISCONTINUED | OUTPATIENT
Start: 2021-03-08 | End: 2021-03-09

## 2021-03-08 RX ORDER — MORPHINE SULFATE 15 MG/1
15 TABLET ORAL 3 TIMES DAILY PRN
Status: DISCONTINUED | OUTPATIENT
Start: 2021-03-08 | End: 2021-03-09 | Stop reason: HOSPADM

## 2021-03-08 RX ORDER — ATORVASTATIN CALCIUM 40 MG/1
40 TABLET, FILM COATED ORAL NIGHTLY
Status: DISCONTINUED | OUTPATIENT
Start: 2021-03-08 | End: 2021-03-09 | Stop reason: HOSPADM

## 2021-03-08 RX ORDER — LISINOPRIL 5 MG/1
5 TABLET ORAL NIGHTLY
Status: DISCONTINUED | OUTPATIENT
Start: 2021-03-08 | End: 2021-03-09 | Stop reason: HOSPADM

## 2021-03-08 RX ADMIN — CHLORHEXIDINE GLUCONATE 15 ML: 1.2 RINSE ORAL at 08:03

## 2021-03-08 RX ADMIN — LISINOPRIL 5 MG: 5 TABLET ORAL at 09:03

## 2021-03-08 RX ADMIN — DOCUSATE SODIUM 100 MG: 100 CAPSULE, LIQUID FILLED ORAL at 08:03

## 2021-03-08 RX ADMIN — OXYCODONE HYDROCHLORIDE 5 MG: 5 TABLET ORAL at 02:03

## 2021-03-08 RX ADMIN — METOPROLOL TARTRATE 50 MG: 50 TABLET, FILM COATED ORAL at 09:03

## 2021-03-08 RX ADMIN — MUPIROCIN: 20 OINTMENT TOPICAL at 08:03

## 2021-03-08 RX ADMIN — METOPROLOL TARTRATE 25 MG: 25 TABLET, FILM COATED ORAL at 08:03

## 2021-03-08 RX ADMIN — TAMSULOSIN HYDROCHLORIDE 0.4 MG: 0.4 CAPSULE ORAL at 09:03

## 2021-03-08 RX ADMIN — ASPIRIN 81 MG: 81 TABLET, DELAYED RELEASE ORAL at 08:03

## 2021-03-08 RX ADMIN — IPRATROPIUM BROMIDE AND ALBUTEROL SULFATE 3 ML: .5; 3 SOLUTION RESPIRATORY (INHALATION) at 08:03

## 2021-03-08 RX ADMIN — IPRATROPIUM BROMIDE AND ALBUTEROL SULFATE 3 ML: .5; 3 SOLUTION RESPIRATORY (INHALATION) at 02:03

## 2021-03-08 RX ADMIN — METOPROLOL TARTRATE 25 MG: 25 TABLET, FILM COATED ORAL at 03:03

## 2021-03-08 RX ADMIN — MORPHINE SULFATE 15 MG: 15 TABLET ORAL at 06:03

## 2021-03-08 RX ADMIN — ACETAMINOPHEN 650 MG: 325 TABLET, FILM COATED ORAL at 10:03

## 2021-03-08 RX ADMIN — PANTOPRAZOLE SODIUM 40 MG: 40 TABLET, DELAYED RELEASE ORAL at 06:03

## 2021-03-08 RX ADMIN — MAGNESIUM OXIDE 400 MG: 400 TABLET ORAL at 10:03

## 2021-03-08 RX ADMIN — ATORVASTATIN CALCIUM 40 MG: 40 TABLET, FILM COATED ORAL at 09:03

## 2021-03-08 RX ADMIN — OXYCODONE HYDROCHLORIDE 5 MG: 5 TABLET ORAL at 10:03

## 2021-03-08 RX ADMIN — DOCUSATE SODIUM 100 MG: 100 CAPSULE, LIQUID FILLED ORAL at 09:03

## 2021-03-09 VITALS
RESPIRATION RATE: 18 BRPM | TEMPERATURE: 98 F | HEART RATE: 86 BPM | OXYGEN SATURATION: 96 % | DIASTOLIC BLOOD PRESSURE: 64 MMHG | WEIGHT: 213.19 LBS | HEIGHT: 69 IN | SYSTOLIC BLOOD PRESSURE: 109 MMHG | BODY MASS INDEX: 31.58 KG/M2

## 2021-03-09 PROBLEM — D64.9 POSTOPERATIVE ANEMIA: Status: RESOLVED | Noted: 2021-03-08 | Resolved: 2021-03-09

## 2021-03-09 LAB
BLD PROD TYP BPU: NORMAL
BLD PROD TYP BPU: NORMAL
BLOOD UNIT EXPIRATION DATE: NORMAL
BLOOD UNIT EXPIRATION DATE: NORMAL
BLOOD UNIT TYPE CODE: 6200
BLOOD UNIT TYPE CODE: 6200
BLOOD UNIT TYPE: NORMAL
BLOOD UNIT TYPE: NORMAL
CODING SYSTEM: NORMAL
CODING SYSTEM: NORMAL
DISPENSE STATUS: NORMAL
DISPENSE STATUS: NORMAL
NUM UNITS TRANS PACKED RBC: NORMAL
NUM UNITS TRANS PACKED RBC: NORMAL

## 2021-03-09 PROCEDURE — 99232 PR SUBSEQUENT HOSPITAL CARE,LEVL II: ICD-10-PCS | Mod: ,,, | Performed by: INTERNAL MEDICINE

## 2021-03-09 PROCEDURE — 25000003 PHARM REV CODE 250: Performed by: INTERNAL MEDICINE

## 2021-03-09 PROCEDURE — 99232 SBSQ HOSP IP/OBS MODERATE 35: CPT | Mod: ,,, | Performed by: INTERNAL MEDICINE

## 2021-03-09 PROCEDURE — 94761 N-INVAS EAR/PLS OXIMETRY MLT: CPT

## 2021-03-09 PROCEDURE — 25000242 PHARM REV CODE 250 ALT 637 W/ HCPCS: Performed by: INTERNAL MEDICINE

## 2021-03-09 PROCEDURE — 99900035 HC TECH TIME PER 15 MIN (STAT)

## 2021-03-09 PROCEDURE — 99900031 HC PATIENT EDUCATION (STAT)

## 2021-03-09 PROCEDURE — 94640 AIRWAY INHALATION TREATMENT: CPT

## 2021-03-09 RX ORDER — LISINOPRIL 5 MG/1
5 TABLET ORAL NIGHTLY
Qty: 30 TABLET | Refills: 0 | Status: SHIPPED | OUTPATIENT
Start: 2021-03-09 | End: 2021-04-09

## 2021-03-09 RX ORDER — ATORVASTATIN CALCIUM 40 MG/1
40 TABLET, FILM COATED ORAL NIGHTLY
Qty: 30 TABLET | Refills: 0 | OUTPATIENT
Start: 2021-03-09 | End: 2023-01-28

## 2021-03-09 RX ORDER — DOCUSATE SODIUM 100 MG/1
100 CAPSULE, LIQUID FILLED ORAL 2 TIMES DAILY
Qty: 60 CAPSULE | Refills: 0 | Status: SHIPPED | OUTPATIENT
Start: 2021-03-09

## 2021-03-09 RX ORDER — ALBUTEROL SULFATE 90 UG/1
2 AEROSOL, METERED RESPIRATORY (INHALATION) EVERY 6 HOURS PRN
Qty: 18 G | Refills: 0 | Status: ON HOLD | OUTPATIENT
Start: 2021-03-09 | End: 2023-01-28 | Stop reason: HOSPADM

## 2021-03-09 RX ORDER — TAMSULOSIN HYDROCHLORIDE 0.4 MG/1
0.4 CAPSULE ORAL NIGHTLY
Qty: 30 CAPSULE | Refills: 0 | Status: SHIPPED | OUTPATIENT
Start: 2021-03-09 | End: 2021-11-01

## 2021-03-09 RX ORDER — METOPROLOL TARTRATE 75 MG/1
75 TABLET, FILM COATED ORAL 2 TIMES DAILY
Qty: 60 TABLET | Refills: 0 | Status: SHIPPED | OUTPATIENT
Start: 2021-03-09 | End: 2021-04-09

## 2021-03-09 RX ORDER — LANOLIN ALCOHOL/MO/W.PET/CERES
400 CREAM (GRAM) TOPICAL DAILY
Qty: 30 TABLET | Refills: 0 | Status: SHIPPED | OUTPATIENT
Start: 2021-03-10 | End: 2023-01-27

## 2021-03-09 RX ORDER — ASPIRIN 81 MG/1
81 TABLET ORAL DAILY
Qty: 30 TABLET | Refills: 0 | OUTPATIENT
Start: 2021-03-10 | End: 2023-01-28

## 2021-03-09 RX ADMIN — MUPIROCIN: 20 OINTMENT TOPICAL at 08:03

## 2021-03-09 RX ADMIN — IPRATROPIUM BROMIDE AND ALBUTEROL SULFATE 3 ML: .5; 3 SOLUTION RESPIRATORY (INHALATION) at 07:03

## 2021-03-09 RX ADMIN — MORPHINE SULFATE 15 MG: 15 TABLET ORAL at 07:03

## 2021-03-09 RX ADMIN — MAGNESIUM OXIDE 400 MG: 400 TABLET ORAL at 08:03

## 2021-03-09 RX ADMIN — ASPIRIN 81 MG: 81 TABLET, DELAYED RELEASE ORAL at 08:03

## 2021-03-09 RX ADMIN — PANTOPRAZOLE SODIUM 40 MG: 40 TABLET, DELAYED RELEASE ORAL at 06:03

## 2021-03-09 RX ADMIN — DOCUSATE SODIUM 100 MG: 100 CAPSULE, LIQUID FILLED ORAL at 08:03

## 2021-03-09 RX ADMIN — METOPROLOL TARTRATE 75 MG: 25 TABLET, FILM COATED ORAL at 08:03

## 2021-03-09 RX ADMIN — MORPHINE SULFATE 15 MG: 15 TABLET ORAL at 12:03

## 2021-03-09 RX ADMIN — IPRATROPIUM BROMIDE AND ALBUTEROL SULFATE 3 ML: .5; 3 SOLUTION RESPIRATORY (INHALATION) at 12:03

## 2021-03-15 ENCOUNTER — TELEPHONE (OUTPATIENT)
Dept: CARDIOLOGY | Facility: CLINIC | Age: 56
End: 2021-03-15

## 2021-03-24 LAB
BSA FOR ECHO PROCEDURE: 2.13 M2
CV STRESS BASE HR: 69 BPM
DIASTOLIC BLOOD PRESSURE: 87 MMHG
OHS CV CPX 1 MINUTE RECOVERY HEART RATE: 92 BPM
OHS CV CPX 85 PERCENT MAX PREDICTED HEART RATE MALE: 141
OHS CV CPX ESTIMATED METS: 5
OHS CV CPX MAX PREDICTED HEART RATE: 166
OHS CV CPX PATIENT IS FEMALE: 0
OHS CV CPX PATIENT IS MALE: 1
OHS CV CPX PEAK DIASTOLIC BLOOD PRESSURE: 66 MMHG
OHS CV CPX PEAK SYSTOLIC BLOOD PRESSURE: 176 MMHG
OHS CV CPX RATE PRESSURE PRODUCT PRESENTING: NORMAL
STRESS ECHO POST EXERCISE DUR MIN: 2 MINUTES
STRESS ECHO POST EXERCISE DUR SEC: 29 SECONDS
STRESS ST DEPRESSION: 1 MM
SYSTOLIC BLOOD PRESSURE: 148 MMHG

## 2021-03-25 ENCOUNTER — TELEPHONE (OUTPATIENT)
Dept: CARDIOLOGY | Facility: CLINIC | Age: 56
End: 2021-03-25

## 2021-04-09 ENCOUNTER — OFFICE VISIT (OUTPATIENT)
Dept: CARDIOLOGY | Facility: CLINIC | Age: 56
End: 2021-04-09
Payer: MEDICARE

## 2021-04-09 VITALS
WEIGHT: 195 LBS | HEIGHT: 69 IN | BODY MASS INDEX: 28.88 KG/M2 | SYSTOLIC BLOOD PRESSURE: 122 MMHG | OXYGEN SATURATION: 97 % | HEART RATE: 94 BPM | DIASTOLIC BLOOD PRESSURE: 70 MMHG

## 2021-04-09 DIAGNOSIS — I10 HYPERTENSION, UNSPECIFIED TYPE: ICD-10-CM

## 2021-04-09 DIAGNOSIS — Z95.1 S/P CABG X 1: ICD-10-CM

## 2021-04-09 DIAGNOSIS — I25.10 CORONARY ARTERY DISEASE INVOLVING NATIVE CORONARY ARTERY OF NATIVE HEART WITHOUT ANGINA PECTORIS: ICD-10-CM

## 2021-04-09 DIAGNOSIS — R07.9 CHEST PAIN, UNSPECIFIED TYPE: Primary | ICD-10-CM

## 2021-04-09 DIAGNOSIS — E78.00 HYPERCHOLESTEROLEMIA: ICD-10-CM

## 2021-04-09 PROCEDURE — 99214 PR OFFICE/OUTPT VISIT, EST, LEVL IV, 30-39 MIN: ICD-10-PCS | Mod: S$GLB,,, | Performed by: INTERNAL MEDICINE

## 2021-04-09 PROCEDURE — 99214 OFFICE O/P EST MOD 30 MIN: CPT | Mod: S$GLB,,, | Performed by: INTERNAL MEDICINE

## 2021-04-09 RX ORDER — METOPROLOL SUCCINATE 25 MG/1
25 TABLET, EXTENDED RELEASE ORAL DAILY
Qty: 30 TABLET | Refills: 11 | Status: SHIPPED | OUTPATIENT
Start: 2021-04-09 | End: 2022-03-28 | Stop reason: SDUPTHER

## 2021-06-28 ENCOUNTER — TELEPHONE (OUTPATIENT)
Dept: CARDIOLOGY | Facility: CLINIC | Age: 56
End: 2021-06-28

## 2021-07-07 ENCOUNTER — TELEPHONE (OUTPATIENT)
Dept: CARDIOLOGY | Facility: CLINIC | Age: 56
End: 2021-07-07

## 2021-07-07 ENCOUNTER — HOSPITAL ENCOUNTER (OUTPATIENT)
Dept: RADIOLOGY | Facility: HOSPITAL | Age: 56
Discharge: HOME | End: 2021-07-07
Attending: NURSE PRACTITIONER
Payer: MEDICARE

## 2021-07-07 ENCOUNTER — OFFICE VISIT (OUTPATIENT)
Dept: CARDIOLOGY | Facility: CLINIC | Age: 56
End: 2021-07-07
Payer: MEDICARE

## 2021-07-07 VITALS
BODY MASS INDEX: 30.07 KG/M2 | HEIGHT: 69 IN | OXYGEN SATURATION: 99 % | SYSTOLIC BLOOD PRESSURE: 108 MMHG | HEART RATE: 65 BPM | DIASTOLIC BLOOD PRESSURE: 68 MMHG | WEIGHT: 203 LBS

## 2021-07-07 DIAGNOSIS — Z95.1 HX OF CABG: Primary | ICD-10-CM

## 2021-07-07 DIAGNOSIS — Z95.1 HX OF CABG: ICD-10-CM

## 2021-07-07 DIAGNOSIS — I10 ESSENTIAL HYPERTENSION: ICD-10-CM

## 2021-07-07 DIAGNOSIS — E78.00 HYPERCHOLESTEROLEMIA: ICD-10-CM

## 2021-07-07 DIAGNOSIS — I25.10 CORONARY ARTERY DISEASE INVOLVING NATIVE CORONARY ARTERY OF NATIVE HEART WITHOUT ANGINA PECTORIS: ICD-10-CM

## 2021-07-07 PROCEDURE — 99214 OFFICE O/P EST MOD 30 MIN: CPT | Mod: S$GLB,,, | Performed by: NURSE PRACTITIONER

## 2021-07-07 PROCEDURE — 99214 PR OFFICE/OUTPT VISIT, EST, LEVL IV, 30-39 MIN: ICD-10-PCS | Mod: S$GLB,,, | Performed by: NURSE PRACTITIONER

## 2021-07-07 PROCEDURE — 71046 X-RAY EXAM CHEST 2 VIEWS: CPT | Mod: TC

## 2021-07-07 RX ORDER — LISINOPRIL 5 MG/1
2.5 TABLET ORAL DAILY PRN
COMMUNITY
End: 2023-01-27

## 2021-10-06 ENCOUNTER — TELEPHONE (OUTPATIENT)
Dept: CARDIOLOGY | Facility: CLINIC | Age: 56
End: 2021-10-06

## 2021-11-01 ENCOUNTER — OFFICE VISIT (OUTPATIENT)
Dept: CARDIOLOGY | Facility: CLINIC | Age: 56
End: 2021-11-01
Payer: MEDICARE

## 2021-11-01 VITALS
SYSTOLIC BLOOD PRESSURE: 122 MMHG | HEIGHT: 69 IN | WEIGHT: 200 LBS | HEART RATE: 77 BPM | OXYGEN SATURATION: 97 % | BODY MASS INDEX: 29.62 KG/M2 | DIASTOLIC BLOOD PRESSURE: 70 MMHG

## 2021-11-01 DIAGNOSIS — E78.00 HYPERCHOLESTEROLEMIA: ICD-10-CM

## 2021-11-01 DIAGNOSIS — I25.10 CORONARY ARTERY DISEASE INVOLVING NATIVE CORONARY ARTERY OF NATIVE HEART WITHOUT ANGINA PECTORIS: ICD-10-CM

## 2021-11-01 PROCEDURE — 99214 PR OFFICE/OUTPT VISIT, EST, LEVL IV, 30-39 MIN: ICD-10-PCS | Mod: S$GLB,,, | Performed by: INTERNAL MEDICINE

## 2021-11-01 PROCEDURE — 99214 OFFICE O/P EST MOD 30 MIN: CPT | Mod: S$GLB,,, | Performed by: INTERNAL MEDICINE

## 2022-03-28 ENCOUNTER — TELEPHONE (OUTPATIENT)
Dept: CARDIOLOGY | Facility: CLINIC | Age: 57
End: 2022-03-28
Payer: MEDICARE

## 2022-03-28 NOTE — TELEPHONE ENCOUNTER
----- Message from Marialuisa Flor sent at 3/28/2022 10:27 AM CDT -----  Type: Needs Medical Advice  Who Called:  Evelin Knight  Best Call Back Number:   Additional Information: Calling to verify if the fax that was sent over for the patient's today has been received for a medical release.

## 2022-03-28 NOTE — LETTER
2022    Jose Alberto Hager  [address]  [place] [address]     [facility] - Cardiology  [address], [address]  [place] [address]  Phone: [phone]  Fax: [phone] Patient: Jose Alberto Hager  : 1965  Referring Doctor:  Type Of Procedure: tooth extraction  Local anesthetic: septocaine injection    Current Outpatient Medications   Medication Sig    albuterol (PROVENTIL HFA) 90 mcg/actuation inhaler Inhale 2 puffs into the lungs every 6 (six) hours as needed for Wheezing. Rescue    aspirin (ECOTRIN) 81 MG EC tablet Take 1 tablet (81 mg total) by mouth once daily.    atorvastatin (LIPITOR) 40 MG tablet Take 1 tablet (40 mg total) by mouth every evening.    docusate sodium (COLACE) 100 MG capsule Take 1 capsule (100 mg total) by mouth 2 (two) times daily.    lisinopriL (PRINIVIL,ZESTRIL) 5 MG tablet Take 2.5 mg by mouth daily as needed.    magnesium oxide (MAG-OX) 400 mg (241.3 mg magnesium) tablet Take 1 tablet (400 mg total) by mouth once daily.    meloxicam (MOBIC) 15 MG tablet Take 15 mg by mouth as needed.    metoprolol succinate (TOPROL-XL) 25 MG 24 hr tablet Take 1 tablet (25 mg total) by mouth once daily.    morphine (MS CONTIN) 15 MG 12 hr tablet Take 15 mg by mouth as needed.    omeprazole (PRILOSEC) 40 MG capsule Take 40 mg by mouth once daily.     No current facility-administered medications for this visit.       This patient has been assessed for risk factors for clearance of surgery with the following stipulations:    _x__ No contraindications  __x_ Recommendations for antiplatelet/anticoagulant medications: hold ASA no longer than 7 days or at discretion of dentist.   _x__ Cleared for surgery  ___ Not cleared for surgery due to the following reasons:      If you have any questions regarding the above, please contact my office at [phone]    Sincerely,  Shanna Mandel PA-C   [facility] - Department of Cardiology  Office: [phone]

## 2022-03-28 NOTE — TELEPHONE ENCOUNTER
----- Message from Arnold Russell sent at 3/28/2022  2:11 PM CDT -----  Type: Needs Medical Advice  Who Called:  Evelin Knight  Best Call Back Number: [phone]         Fax [phone]  Additional Information: Still waiting to hear about the release they are trying to clear the pt for the extraction today as he is in a lot of pain.  Please advise -- Thank you

## 2022-03-29 NOTE — TELEPHONE ENCOUNTER
----- Message from Marialuisa Flor sent at 3/29/2022 12:52 PM CDT -----  Type: Needs Medical Advice  Who Called:  Patient  Best Call Back Number:   Additional Information: Calling to speak with the nurse about getting a clearance from Dr Rodriguez to have dental work done.

## 2022-05-02 ENCOUNTER — OFFICE VISIT (OUTPATIENT)
Dept: CARDIOLOGY | Facility: CLINIC | Age: 57
End: 2022-05-02
Payer: MEDICARE

## 2022-05-02 VITALS
BODY MASS INDEX: 29.77 KG/M2 | HEART RATE: 72 BPM | HEIGHT: 69 IN | OXYGEN SATURATION: 96 % | WEIGHT: 201 LBS | SYSTOLIC BLOOD PRESSURE: 122 MMHG | DIASTOLIC BLOOD PRESSURE: 78 MMHG

## 2022-05-02 DIAGNOSIS — I10 PRIMARY HYPERTENSION: ICD-10-CM

## 2022-05-02 DIAGNOSIS — I25.10 CORONARY ARTERY DISEASE INVOLVING NATIVE CORONARY ARTERY OF NATIVE HEART WITHOUT ANGINA PECTORIS: Primary | ICD-10-CM

## 2022-05-02 DIAGNOSIS — E78.00 HYPERCHOLESTEROLEMIA: ICD-10-CM

## 2022-05-02 PROCEDURE — 3074F SYST BP LT 130 MM HG: CPT | Mod: CPTII,S$GLB,, | Performed by: INTERNAL MEDICINE

## 2022-05-02 PROCEDURE — 1159F PR MEDICATION LIST DOCUMENTED IN MEDICAL RECORD: ICD-10-PCS | Mod: CPTII,S$GLB,, | Performed by: INTERNAL MEDICINE

## 2022-05-02 PROCEDURE — 99213 PR OFFICE/OUTPT VISIT, EST, LEVL III, 20-29 MIN: ICD-10-PCS | Mod: S$GLB,,, | Performed by: INTERNAL MEDICINE

## 2022-05-02 PROCEDURE — 1160F RVW MEDS BY RX/DR IN RCRD: CPT | Mod: CPTII,S$GLB,, | Performed by: INTERNAL MEDICINE

## 2022-05-02 PROCEDURE — 1159F MED LIST DOCD IN RCRD: CPT | Mod: CPTII,S$GLB,, | Performed by: INTERNAL MEDICINE

## 2022-05-02 PROCEDURE — 1160F PR REVIEW ALL MEDS BY PRESCRIBER/CLIN PHARMACIST DOCUMENTED: ICD-10-PCS | Mod: CPTII,S$GLB,, | Performed by: INTERNAL MEDICINE

## 2022-05-02 PROCEDURE — 3078F PR MOST RECENT DIASTOLIC BLOOD PRESSURE < 80 MM HG: ICD-10-PCS | Mod: CPTII,S$GLB,, | Performed by: INTERNAL MEDICINE

## 2022-05-02 PROCEDURE — 99213 OFFICE O/P EST LOW 20 MIN: CPT | Mod: S$GLB,,, | Performed by: INTERNAL MEDICINE

## 2022-05-02 PROCEDURE — 3008F PR BODY MASS INDEX (BMI) DOCUMENTED: ICD-10-PCS | Mod: CPTII,S$GLB,, | Performed by: INTERNAL MEDICINE

## 2022-05-02 PROCEDURE — 3074F PR MOST RECENT SYSTOLIC BLOOD PRESSURE < 130 MM HG: ICD-10-PCS | Mod: CPTII,S$GLB,, | Performed by: INTERNAL MEDICINE

## 2022-05-02 PROCEDURE — 3008F BODY MASS INDEX DOCD: CPT | Mod: CPTII,S$GLB,, | Performed by: INTERNAL MEDICINE

## 2022-05-02 PROCEDURE — 3078F DIAST BP <80 MM HG: CPT | Mod: CPTII,S$GLB,, | Performed by: INTERNAL MEDICINE

## 2022-05-02 NOTE — PROGRESS NOTES
Patient ID:  Jose Alberto Hager is a 56 y.o. male who presents for follow-up of Hyperlipidemia, Dizziness, and Coronary Artery Disease      He denies any chest pains any shortness of breath.  From cardiovascular standpoint he has been doing well he does complain of ringing in his ear that started about 2 weeks ago.  He also is complaining of tingling of the left breast.  His most recent laboratory on  09/20/2021 cholesterol 122 LDL 64 HDL 40. His liver enzymes are within normal limits as well as his BUN and creatinine.      Past Medical History:   Diagnosis Date    Back pain     Coronary artery disease     High cholesterol     Hypertension     Insomnia         Past Surgical History:   Procedure Laterality Date    ANGIOGRAM, CORONARY, WITH LEFT HEART CATHETERIZATION N/A 3/4/2021    Procedure: Angiogram, Coronary, with Left Heart Cath;  Surgeon: Aquilino Rodriguez MD;  Location: [facility] CATH/EP LAB;  Service: Cardiology;  Laterality: N/A;    BACK SURGERY      CARDIAC CATHETERIZATION      CORONARY ARTERY BYPASS GRAFT      CREATION OF BYPASS OF CORONARY ARTERY USING GRAFT WITHOUT CARDIOPULMONARY PUMP OXYGENATION N/A 3/5/2021    Procedure: CABG, WITHOUT CARDIOPULMONARY PUMP OXYGENATION;  Surgeon: Toy Castillo MD;  Location: [facility] OR;  Service: Cardiothoracic;  Laterality: N/A;    KNEE SURGERY Left           Current Outpatient Medications   Medication Instructions    albuterol (PROVENTIL HFA) 90 mcg/actuation inhaler 2 puffs, Inhalation, Every 6 hours PRN, Rescue    aspirin (ECOTRIN) 81 mg, Oral, Daily    atorvastatin (LIPITOR) 40 mg, Oral, Nightly    docusate sodium (COLACE) 100 mg, Oral, 2 times daily    lisinopriL (PRINIVIL,ZESTRIL) 2.5 mg, Oral, Daily PRN    magnesium oxide (MAG-OX) 400 mg, Oral, Daily    meloxicam (MOBIC) 15 mg, Oral, As needed (PRN)    metoprolol succinate (TOPROL-XL) 25 MG 24 hr tablet TAKE ONE TABLET BY MOUTH ONCE DAILY    morphine (MS CONTIN) 15 mg, Oral, As needed  "(PRN)    omeprazole (PRILOSEC) 40 mg, Oral, Daily        Review of patient's allergies indicates:  No Known Allergies     Review of Systems   HENT: Positive for tinnitus.    Cardiovascular: Negative for chest pain, leg swelling and palpitations.   Respiratory: Negative for cough and shortness of breath.         Objective:     Vitals:    05/02/22 1441   BP: 122/78   BP Location: Right arm   Patient Position: Sitting   BP Method: Medium (Manual)   Pulse: 72   SpO2: 96%   Weight: 91.2 kg (201 lb)   Height: 5' 9" (1.753 m)       Physical Exam  Vitals and nursing note reviewed.   Constitutional:       Appearance: He is well-developed.   HENT:      Head: Normocephalic and atraumatic.   Eyes:      Conjunctiva/sclera: Conjunctivae normal.   Cardiovascular:      Rate and Rhythm: Normal rate and regular rhythm.      Heart sounds: Normal heart sounds.   Pulmonary:      Effort: Pulmonary effort is normal.      Breath sounds: Normal breath sounds.   Abdominal:      General: Bowel sounds are normal.      Palpations: Abdomen is soft.   Musculoskeletal:         General: Normal range of motion.   Skin:     General: Skin is warm and dry.   Neurological:      Mental Status: He is alert and oriented to person, place, and time.   Psychiatric:         Behavior: Behavior normal.         Thought Content: Thought content normal.         Judgment: Judgment normal.       CMP  Sodium   Date Value Ref Range Status   03/08/2021 137 136 - 145 mmol/L Final     Potassium   Date Value Ref Range Status   03/08/2021 4.3 3.5 - 5.1 mmol/L Final     Chloride   Date Value Ref Range Status   03/08/2021 104 95 - 110 mmol/L Final     CO2   Date Value Ref Range Status   03/08/2021 25 23 - 29 mmol/L Final     Glucose   Date Value Ref Range Status   03/08/2021 118 (H) 70 - 110 mg/dL Final     BUN   Date Value Ref Range Status   03/08/2021 18 6 - 20 mg/dL Final     Creatinine   Date Value Ref Range Status   03/08/2021 0.9 0.5 - 1.4 mg/dL Final     Calcium "   Date Value Ref Range Status   03/08/2021 8.3 (L) 8.7 - 10.5 mg/dL Final     Total Protein   Date Value Ref Range Status   03/08/2021 5.7 (L) 6.0 - 8.4 g/dL Final     Albumin   Date Value Ref Range Status   03/08/2021 3.1 (L) 3.5 - 5.2 g/dL Final     Total Bilirubin   Date Value Ref Range Status   03/08/2021 0.9 0.1 - 1.0 mg/dL Final     Comment:     For infants and newborns, interpretation of results should be based  on gestational age, weight and in agreement with clinical  observations.    Premature Infant recommended reference ranges:  Up to 24 hours.............<8.0 mg/dL  Up to 48 hours............<12.0 mg/dL  3-5 days..................<15.0 mg/dL  6-29 days.................<15.0 mg/dL       Alkaline Phosphatase   Date Value Ref Range Status   03/08/2021 36 (L) 55 - 135 U/L Final     AST   Date Value Ref Range Status   03/08/2021 20 10 - 40 U/L Final     ALT   Date Value Ref Range Status   03/08/2021 15 10 - 44 U/L Final     Anion Gap   Date Value Ref Range Status   03/08/2021 8 8 - 16 mmol/L Final     eGFR if    Date Value Ref Range Status   03/08/2021 >60.0 >60 mL/min/1.73 m^2 Final     eGFR if non    Date Value Ref Range Status   03/08/2021 >60.0 >60 mL/min/1.73 m^2 Final     Comment:     Calculation used to obtain the estimated glomerular filtration  rate (eGFR) is the CKD-EPI equation.         BMP  Lab Results   Component Value Date     03/08/2021    K 4.3 03/08/2021     03/08/2021    CO2 25 03/08/2021    BUN 18 03/08/2021    CREATININE 0.9 03/08/2021    CALCIUM 8.3 (L) 03/08/2021    ANIONGAP 8 03/08/2021    ESTGFRAFRICA >60.0 03/08/2021    EGFRNONAA >60.0 03/08/2021      BNP  @LABRCNTIP(BNP,BNPTRIAGEBLO)@   Lab Results   Component Value Date    CHOL 191 03/04/2021     Lab Results   Component Value Date    HDL 32 (L) 03/04/2021     Lab Results   Component Value Date    LDLCALC 140.4 03/04/2021     Lab Results   Component Value Date    TRIG 93 03/04/2021     Lab  Results   Component Value Date    CHOLHDL 16.8 (L) 03/04/2021      Lab Results   Component Value Date    TSH 1.550 03/03/2021     Lab Results   Component Value Date    HGBA1C 6.0 03/04/2021     Lab Results   Component Value Date    WBC 9.69 03/08/2021    HGB 9.9 (L) 03/08/2021    HCT 30.2 (L) 03/08/2021    MCV 88 03/08/2021     (L) 03/08/2021         No results found for this or any previous visit.     Results for orders placed during the hospital encounter of 03/03/21    Cardiac catheterization    Conclusion  · The Ost LAD lesion was 95% stenosed.  · The Dist LAD lesion was 50% stenosed.  · The estimated blood loss was <50 mL.  · There was single vessel coronary artery disease.    The procedure log was documented by Documenter: RT Walker and verified by Aquilino Rodriguez MD.    Date: 3/6/2021  Time: 2:36 PM         Assessment:       Hypercholesterolemia  Much better on 40 mg of atorvastatin.    Hypertension  Controlled on lisinopril metoprolol.    CAD (coronary artery disease)  Stable no symptoms of angina.  He had coronary artery bypass LIMA to the left anterior descending artery.  It is possible that the tingling on the left breast is secondary to his LIMA connected to the left anterior descending artery.  This was discussed with the patient.       Plan:       Continue current medical therapy return to the office in 6 months.  A lipid CMP TSH CBC will be obtained.

## 2022-05-20 NOTE — ASSESSMENT & PLAN NOTE
Stable no symptoms of angina.  He had coronary artery bypass LIMA to the left anterior descending artery.  It is possible that the tingling on the left breast is secondary to his LIMA connected to the left anterior descending artery.  This was discussed with the patient.

## 2023-01-27 ENCOUNTER — HOSPITAL ENCOUNTER (OUTPATIENT)
Facility: HOSPITAL | Age: 58
Discharge: HOME | End: 2023-01-28
Attending: EMERGENCY MEDICINE | Admitting: INTERNAL MEDICINE
Payer: MEDICAID

## 2023-01-27 ENCOUNTER — TELEPHONE (OUTPATIENT)
Dept: CARDIOLOGY | Facility: CLINIC | Age: 58
End: 2023-01-27
Payer: MEDICAID

## 2023-01-27 DIAGNOSIS — M54.10 RADICULOPATHY, UNSPECIFIED SPINAL REGION: Primary | ICD-10-CM

## 2023-01-27 DIAGNOSIS — I25.10 CORONARY ARTERY DISEASE: ICD-10-CM

## 2023-01-27 DIAGNOSIS — R07.9 CHEST PAIN: ICD-10-CM

## 2023-01-27 PROBLEM — R42 LIGHTHEADEDNESS: Status: ACTIVE | Noted: 2023-01-27

## 2023-01-27 LAB
ALBUMIN SERPL BCP-MCNC: 4.6 G/DL (ref 3.5–5.2)
ALP SERPL-CCNC: 79 U/L (ref 55–135)
ALT SERPL W/O P-5'-P-CCNC: 21 U/L (ref 10–44)
ANION GAP SERPL CALC-SCNC: 8 MMOL/L (ref 8–16)
AST SERPL-CCNC: 21 U/L (ref 10–40)
BASOPHILS # BLD AUTO: 0.06 K/UL (ref 0–0.2)
BASOPHILS NFR BLD: 0.7 % (ref 0–1.9)
BILIRUB SERPL-MCNC: 0.9 MG/DL (ref 0.1–1)
BNP SERPL-MCNC: 15 PG/ML (ref 0–99)
BUN SERPL-MCNC: 15 MG/DL (ref 6–20)
CALCIUM SERPL-MCNC: 9.5 MG/DL (ref 8.7–10.5)
CHLORIDE SERPL-SCNC: 101 MMOL/L (ref 95–110)
CHOLEST SERPL-MCNC: 127 MG/DL (ref 120–199)
CHOLEST/HDLC SERPL: 3.7 {RATIO} (ref 2–5)
CO2 SERPL-SCNC: 28 MMOL/L (ref 23–29)
CREAT SERPL-MCNC: 1.1 MG/DL (ref 0.5–1.4)
CREAT SERPL-MCNC: 1.3 MG/DL (ref 0.5–1.4)
DIFFERENTIAL METHOD: NORMAL
EOSINOPHIL # BLD AUTO: 0.5 K/UL (ref 0–0.5)
EOSINOPHIL NFR BLD: 5.5 % (ref 0–8)
ERYTHROCYTE [DISTWIDTH] IN BLOOD BY AUTOMATED COUNT: 13.2 % (ref 11.5–14.5)
EST. GFR  (NO RACE VARIABLE): >60 ML/MIN/1.73 M^2
GLUCOSE SERPL-MCNC: 102 MG/DL (ref 70–110)
GLUCOSE SERPL-MCNC: 104 MG/DL (ref 70–110)
HCT VFR BLD AUTO: 45.1 % (ref 40–54)
HDLC SERPL-MCNC: 34 MG/DL (ref 40–75)
HDLC SERPL: 26.8 % (ref 20–50)
HGB BLD-MCNC: 14.5 G/DL (ref 14–18)
IMM GRANULOCYTES # BLD AUTO: 0.02 K/UL (ref 0–0.04)
IMM GRANULOCYTES NFR BLD AUTO: 0.2 % (ref 0–0.5)
INR PPP: 1 (ref 0.8–1.2)
LDLC SERPL CALC-MCNC: 73.4 MG/DL (ref 63–159)
LYMPHOCYTES # BLD AUTO: 2.7 K/UL (ref 1–4.8)
LYMPHOCYTES NFR BLD: 30.6 % (ref 18–48)
MCH RBC QN AUTO: 27.2 PG (ref 27–31)
MCHC RBC AUTO-ENTMCNC: 32.2 G/DL (ref 32–36)
MCV RBC AUTO: 85 FL (ref 82–98)
MONOCYTES # BLD AUTO: 0.7 K/UL (ref 0.3–1)
MONOCYTES NFR BLD: 7.7 % (ref 4–15)
NEUTROPHILS # BLD AUTO: 4.8 K/UL (ref 1.8–7.7)
NEUTROPHILS NFR BLD: 55.3 % (ref 38–73)
NONHDLC SERPL-MCNC: 93 MG/DL
NRBC BLD-RTO: 0 /100 WBC
PLATELET # BLD AUTO: 225 K/UL (ref 150–450)
PMV BLD AUTO: 11 FL (ref 9.2–12.9)
POTASSIUM SERPL-SCNC: 4.4 MMOL/L (ref 3.5–5.1)
PROT SERPL-MCNC: 7.7 G/DL (ref 6–8.4)
PROTHROMBIN TIME: 10.4 SEC (ref 9–12.5)
RBC # BLD AUTO: 5.33 M/UL (ref 4.6–6.2)
SAMPLE: NORMAL
SODIUM SERPL-SCNC: 137 MMOL/L (ref 136–145)
TRIGL SERPL-MCNC: 98 MG/DL (ref 30–150)
TROPONIN I SERPL HS-MCNC: 2.7 PG/ML (ref 0–14.9)
TROPONIN I SERPL HS-MCNC: 5.8 PG/ML (ref 0–14.9)
TSH SERPL DL<=0.005 MIU/L-ACNC: 1.7 UIU/ML (ref 0.34–5.6)
WBC # BLD AUTO: 8.66 K/UL (ref 3.9–12.7)

## 2023-01-27 PROCEDURE — G0378 HOSPITAL OBSERVATION PER HR: HCPCS

## 2023-01-27 PROCEDURE — 25500020 PHARM REV CODE 255: Performed by: EMERGENCY MEDICINE

## 2023-01-27 PROCEDURE — 36415 COLL VENOUS BLD VENIPUNCTURE: CPT | Performed by: EMERGENCY MEDICINE

## 2023-01-27 PROCEDURE — 25000003 PHARM REV CODE 250: Performed by: EMERGENCY MEDICINE

## 2023-01-27 PROCEDURE — 80061 LIPID PANEL: CPT | Performed by: EMERGENCY MEDICINE

## 2023-01-27 PROCEDURE — 99285 EMERGENCY DEPT VISIT HI MDM: CPT | Mod: 25

## 2023-01-27 PROCEDURE — 84443 ASSAY THYROID STIM HORMONE: CPT | Performed by: EMERGENCY MEDICINE

## 2023-01-27 PROCEDURE — 93010 EKG 12-LEAD: ICD-10-PCS | Mod: ,,, | Performed by: INTERNAL MEDICINE

## 2023-01-27 PROCEDURE — 85610 PROTHROMBIN TIME: CPT | Performed by: EMERGENCY MEDICINE

## 2023-01-27 PROCEDURE — 93010 ELECTROCARDIOGRAM REPORT: CPT | Mod: ,,, | Performed by: INTERNAL MEDICINE

## 2023-01-27 PROCEDURE — 80053 COMPREHEN METABOLIC PANEL: CPT | Performed by: EMERGENCY MEDICINE

## 2023-01-27 PROCEDURE — 84484 ASSAY OF TROPONIN QUANT: CPT | Mod: 91 | Performed by: EMERGENCY MEDICINE

## 2023-01-27 PROCEDURE — 25000003 PHARM REV CODE 250: Performed by: NURSE PRACTITIONER

## 2023-01-27 PROCEDURE — 93005 ELECTROCARDIOGRAM TRACING: CPT | Performed by: INTERNAL MEDICINE

## 2023-01-27 PROCEDURE — 83880 ASSAY OF NATRIURETIC PEPTIDE: CPT | Performed by: EMERGENCY MEDICINE

## 2023-01-27 PROCEDURE — 85025 COMPLETE CBC W/AUTO DIFF WBC: CPT | Performed by: EMERGENCY MEDICINE

## 2023-01-27 RX ORDER — ASPIRIN 325 MG
325 TABLET ORAL
Status: COMPLETED | OUTPATIENT
Start: 2023-01-27 | End: 2023-01-27

## 2023-01-27 RX ORDER — SIMETHICONE 80 MG
1 TABLET,CHEWABLE ORAL 4 TIMES DAILY PRN
Status: DISCONTINUED | OUTPATIENT
Start: 2023-01-27 | End: 2023-01-28 | Stop reason: HOSPADM

## 2023-01-27 RX ORDER — TALC
6 POWDER (GRAM) TOPICAL NIGHTLY PRN
Status: DISCONTINUED | OUTPATIENT
Start: 2023-01-27 | End: 2023-01-28 | Stop reason: HOSPADM

## 2023-01-27 RX ORDER — MORPHINE SULFATE 15 MG/1
15 TABLET ORAL 3 TIMES DAILY PRN
COMMUNITY
Start: 2023-01-21

## 2023-01-27 RX ORDER — DOCUSATE SODIUM 100 MG/1
100 CAPSULE, LIQUID FILLED ORAL 2 TIMES DAILY
Status: DISCONTINUED | OUTPATIENT
Start: 2023-01-27 | End: 2023-01-28 | Stop reason: HOSPADM

## 2023-01-27 RX ORDER — SODIUM CHLORIDE 0.9 % (FLUSH) 0.9 %
10 SYRINGE (ML) INJECTION
Status: DISCONTINUED | OUTPATIENT
Start: 2023-01-27 | End: 2023-01-28 | Stop reason: HOSPADM

## 2023-01-27 RX ORDER — MORPHINE SULFATE 15 MG/1
15 TABLET ORAL 3 TIMES DAILY PRN
Status: DISCONTINUED | OUTPATIENT
Start: 2023-01-27 | End: 2023-01-28 | Stop reason: HOSPADM

## 2023-01-27 RX ORDER — BACLOFEN 10 MG/1
10 TABLET ORAL 2 TIMES DAILY PRN
Status: ON HOLD | COMMUNITY
Start: 2023-01-19 | End: 2023-01-28 | Stop reason: HOSPADM

## 2023-01-27 RX ORDER — ATORVASTATIN CALCIUM 40 MG/1
1 TABLET, FILM COATED ORAL DAILY
COMMUNITY
Start: 2022-04-12

## 2023-01-27 RX ORDER — METOPROLOL SUCCINATE 25 MG/1
25 TABLET, EXTENDED RELEASE ORAL DAILY
Status: DISCONTINUED | OUTPATIENT
Start: 2023-01-28 | End: 2023-01-28 | Stop reason: HOSPADM

## 2023-01-27 RX ORDER — ASPIRIN 81 MG/1
81 TABLET ORAL DAILY
COMMUNITY
End: 2023-02-13

## 2023-01-27 RX ORDER — GABAPENTIN 300 MG/1
300 CAPSULE ORAL 2 TIMES DAILY PRN
COMMUNITY
Start: 2023-01-19 | End: 2023-02-13

## 2023-01-27 RX ORDER — ASPIRIN 81 MG/1
81 TABLET ORAL DAILY
Status: DISCONTINUED | OUTPATIENT
Start: 2023-01-28 | End: 2023-01-28 | Stop reason: HOSPADM

## 2023-01-27 RX ORDER — NALOXONE HCL 0.4 MG/ML
0.02 VIAL (ML) INJECTION
Status: DISCONTINUED | OUTPATIENT
Start: 2023-01-27 | End: 2023-01-28 | Stop reason: HOSPADM

## 2023-01-27 RX ORDER — ACETAMINOPHEN 325 MG/1
650 TABLET ORAL EVERY 4 HOURS PRN
Status: DISCONTINUED | OUTPATIENT
Start: 2023-01-27 | End: 2023-01-28 | Stop reason: HOSPADM

## 2023-01-27 RX ORDER — POLYETHYLENE GLYCOL 3350 17 G/17G
17 POWDER, FOR SOLUTION ORAL 2 TIMES DAILY PRN
Status: DISCONTINUED | OUTPATIENT
Start: 2023-01-27 | End: 2023-01-28 | Stop reason: HOSPADM

## 2023-01-27 RX ORDER — ONDANSETRON 2 MG/ML
4 INJECTION INTRAMUSCULAR; INTRAVENOUS EVERY 6 HOURS PRN
Status: DISCONTINUED | OUTPATIENT
Start: 2023-01-27 | End: 2023-01-28 | Stop reason: HOSPADM

## 2023-01-27 RX ORDER — ATORVASTATIN CALCIUM 40 MG/1
40 TABLET, FILM COATED ORAL NIGHTLY
Status: DISCONTINUED | OUTPATIENT
Start: 2023-01-27 | End: 2023-01-28 | Stop reason: HOSPADM

## 2023-01-27 RX ADMIN — ATORVASTATIN CALCIUM 40 MG: 40 TABLET, FILM COATED ORAL at 08:01

## 2023-01-27 RX ADMIN — IOHEXOL 100 ML: 350 INJECTION, SOLUTION INTRAVENOUS at 02:01

## 2023-01-27 RX ADMIN — ASPIRIN 325 MG ORAL TABLET 325 MG: 325 PILL ORAL at 01:01

## 2023-01-27 RX ADMIN — DOCUSATE SODIUM 100 MG: 100 CAPSULE, LIQUID FILLED ORAL at 08:01

## 2023-01-27 NOTE — ASSESSMENT & PLAN NOTE
BNP 15, initial and repeat troponin are pending, 12 lead EKG with normal sinus rhythm, ventricular rate 68 and  with no abnormalities., monitor on telemetry, consult Cardiology, schedule for stress test in a.m., obtain echocardiogram

## 2023-01-27 NOTE — TELEPHONE ENCOUNTER
Spoke with patient wife due to patient having weird feeling in armpit, fatigue and tingling advised due to these symptoms he needs to go to ER because it could be early signs of a CVA. Pt wife understood and said she would get him to [facility].

## 2023-01-27 NOTE — ED NOTES
"Called [facility] for first trop result- they said "we are having trouble with our machine" and results are not back.  Dr vasquez    "

## 2023-01-27 NOTE — HPI
Jose Alberto Hager is a 57-year-old male with chronic medical problems including CAD status post CABG, hypertension and hyperlipidemia who presents to the emergency room complaining of intermittent chest pain for the last couple of weeks.  Patient states that he has been having intermittent pain under bilateral armpits that is random and not associated with any specific activity.  Associated symptoms are left-sided chest pain that goes from the front through to the back, fatigue and lightheadedness.  It feels like it is sticking in his chest and occasionally goes up into his neck.  He said symptoms last for a little while and then resolve on their own.  Said he does not specifically feel short of breath but he feels like he can not take a deep breath when it occurs.  No associated nausea or diaphoresis.  He denies any other symptoms such as headache, numbness or tingling, lateralized weakness, vision or speech changes, nausea vomiting, abdominal pain, swelling or palpitations.      In the ED, chest x-ray with cardiomegaly and possible small effusion.  lab work unremarkable, CTA chest with no evidence of aortic dissection, tiny left pleural effusion, CT head negative, CTA head and neck with minimal atherosclerotic plaque in the left carotid bulb and origin of the left ICA and mild atherosclerotic plaque in the right ICA origin without narrowing.  Vital signs stable with blood pressure mildly elevated at 150 7/99, heart rate 75, O2 sat 100% on room air.  He was given aspirin 325 mg.  He will be admitted to the hospitalist service with consult to Cardiology.

## 2023-01-27 NOTE — H&P
[facility] - Emergency Dept  Hospital Medicine  History & Physical    Patient Name: Jose Alberto Hager  MRN: [id number]  Patient Class: OP- Observation  Admission Date: 1/27/2023  Attending Physician: Kuldip Rojas MD   Primary Care Provider: Tate Malcolm Iv, MD         Patient information was obtained from patient, spouse/SO, past medical records and ER records.     Subjective:     Principal Problem:Chest pain    Chief Complaint:   Chief Complaint   Patient presents with    Arm Pain    Neck Pain        HPI: Jose Alberto Hager is a 57-year-old male with chronic medical problems including CAD status post CABG, hypertension and hyperlipidemia who presents to the emergency room complaining of intermittent chest pain for the last couple of weeks.  Patient states that he has been having intermittent pain under bilateral armpits that is random and not associated with any specific activity.  Associated symptoms are left-sided chest pain that goes from the front through to the back, fatigue and lightheadedness.  It feels like it is sticking in his chest and occasionally goes up into his neck.  He said symptoms last for a little while and then resolve on their own.  Said he does not specifically feel short of breath but he feels like he can not take a deep breath when it occurs.  No associated nausea or diaphoresis.  He denies any other symptoms such as headache, numbness or tingling, lateralized weakness, vision or speech changes, nausea vomiting, abdominal pain, swelling or palpitations.      In the ED, chest x-ray with cardiomegaly and possible small effusion.  lab work unremarkable, CTA chest with no evidence of aortic dissection, tiny left pleural effusion, CT head negative, CTA head and neck with minimal atherosclerotic plaque in the left carotid bulb and origin of the left ICA and mild atherosclerotic plaque in the right ICA origin without narrowing.  Vital signs stable with blood pressure mildly  elevated at 150 7/99, heart rate 75, O2 sat 100% on room air.  He was given aspirin 325 mg.  He will be admitted to the hospitalist service with consult to Cardiology.      Past Medical History:   Diagnosis Date    Back pain     Coronary artery disease     High cholesterol     Hypertension     Insomnia        Past Surgical History:   Procedure Laterality Date    ANGIOGRAM, CORONARY, WITH LEFT HEART CATHETERIZATION N/A 3/4/2021    Procedure: Angiogram, Coronary, with Left Heart Cath;  Surgeon: Aquilino Rodriguez MD;  Location: [facility] CATH/EP LAB;  Service: Cardiology;  Laterality: N/A;    BACK SURGERY      CARDIAC CATHETERIZATION      CORONARY ARTERY BYPASS GRAFT      CREATION OF BYPASS OF CORONARY ARTERY USING GRAFT WITHOUT CARDIOPULMONARY PUMP OXYGENATION N/A 3/5/2021    Procedure: CABG, WITHOUT CARDIOPULMONARY PUMP OXYGENATION;  Surgeon: Toy Castillo MD;  Location: [facility] OR;  Service: Cardiothoracic;  Laterality: N/A;    KNEE SURGERY Left        Review of patient's allergies indicates:  No Known Allergies    No current facility-administered medications on file prior to encounter.     Current Outpatient Medications on File Prior to Encounter   Medication Sig    aspirin (ECOTRIN) 81 MG EC tablet Take 81 mg by mouth once daily.    atorvastatin (LIPITOR) 40 MG tablet Take 1 tablet by mouth once daily.    docusate sodium (COLACE) 100 MG capsule Take 1 capsule (100 mg total) by mouth 2 (two) times daily.    metoprolol succinate (TOPROL-XL) 25 MG 24 hr tablet TAKE ONE TABLET BY MOUTH ONCE DAILY    morphine (MSIR) 15 MG tablet Take 15 mg by mouth 3 (three) times daily as needed.    multivit-mins/iron/folic/lycop (CENTRUM ULTRA MEN'S ORAL) Take 1 Dose by mouth once daily.    omeprazole (PRILOSEC) 40 MG capsule Take 40 mg by mouth once daily.    [DISCONTINUED] morphine (MS CONTIN) 15 MG 12 hr tablet Take 15 mg by mouth as needed.    albuterol (PROVENTIL HFA) 90 mcg/actuation inhaler Inhale 2 puffs into the lungs every 6  (six) hours as needed for Wheezing. Rescue    baclofen (LIORESAL) 10 MG tablet Take 10 mg by mouth 2 (two) times daily as needed.    gabapentin (NEURONTIN) 300 MG capsule Take 300 mg by mouth 2 (two) times daily as needed.    meloxicam (MOBIC) 15 MG tablet Take 15 mg by mouth as needed.    [DISCONTINUED] lisinopriL (PRINIVIL,ZESTRIL) 5 MG tablet Take 2.5 mg by mouth daily as needed.    [DISCONTINUED] magnesium oxide (MAG-OX) 400 mg (241.3 mg magnesium) tablet Take 1 tablet (400 mg total) by mouth once daily.     Family History       Problem Relation (Age of Onset)    Brain cancer Mother    Cancer Father          Tobacco Use    Smoking status: Never    Smokeless tobacco: Never   Substance and Sexual Activity    Alcohol use: No    Drug use: Not on file    Sexual activity: Not on file     Review of Systems   All other systems reviewed and are negative.  Twelve point review of systems obtained and negative except as stated above in HPI      Objective:     Vital Signs (Most Recent):  Temp: 97.9 °F (36.6 °C) (01/27/23 1319)  Pulse: 66 (01/27/23 1526)  Resp: 16 (01/27/23 1526)  BP: 119/66 (01/27/23 1526)  SpO2: 96 % (01/27/23 1526) Vital Signs (24h Range):  Temp:  [97.9 °F (36.6 °C)] 97.9 °F (36.6 °C)  Pulse:  [66-75] 66  Resp:  [16-18] 16  SpO2:  [96 %-100 %] 96 %  BP: (119-157)/(66-99) 119/66     Weight: 90.7 kg (200 lb)  Body mass index is 29.53 kg/m².    Physical Exam  Vitals and nursing note reviewed.   Constitutional:       General: He is not in acute distress.     Appearance: Normal appearance. He is not ill-appearing.      Comments: Sitting up in bed awake and alert, his wife is at bedside, he is in no acute distress.   HENT:      Head: Normocephalic.      Right Ear: External ear normal.      Left Ear: External ear normal.      Nose: Nose normal.      Mouth/Throat:      Mouth: Mucous membranes are moist.      Pharynx: Oropharynx is clear.   Eyes:      Conjunctiva/sclera: Conjunctivae normal.   Cardiovascular:       Rate and Rhythm: Normal rate and regular rhythm.      Pulses: Normal pulses.      Heart sounds: Normal heart sounds. No murmur heard.  Pulmonary:      Effort: Pulmonary effort is normal.      Breath sounds: Normal breath sounds.   Abdominal:      General: Abdomen is protuberant. Bowel sounds are normal. There is no distension.      Palpations: Abdomen is soft.   Musculoskeletal:      Cervical back: Normal range of motion.      Right lower leg: No edema.      Left lower leg: No edema.      Comments: Moves all extremities with good equal strength, no edema noted.   Skin:     General: Skin is warm and dry.   Neurological:      General: No focal deficit present.      Mental Status: He is alert and oriented to person, place, and time.   Psychiatric:         Mood and Affect: Mood normal.         Behavior: Behavior normal.         Thought Content: Thought content normal.         Judgment: Judgment normal.           Significant Labs: All pertinent labs within the past 24 hours have been reviewed.    CBC:   Recent Labs   Lab 01/27/23  1329   WBC 8.66   HGB 14.5   HCT 45.1        CMP:   Recent Labs   Lab 01/27/23  1329      K 4.4      CO2 28      BUN 15   CREATININE 1.1   CALCIUM 9.5   PROT 7.7   ALBUMIN 4.6   BILITOT 0.9   ALKPHOS 79   AST 21   ALT 21   ANIONGAP 8       Lipid Panel:   Recent Labs   Lab 01/27/23  1329   CHOL 127   HDL 34*   LDLCALC 73.4   TRIG 98   CHOLHDL 26.8       TSH:   Recent Labs   Lab 01/27/23  1329   TSH 1.700         Significant Imaging: I have reviewed all pertinent imaging results/findings within the past 24 hours.    X-Ray Chest AP Portable [[id number]]Collected: 01/27/23 1344     The heart is enlarged. There are median sternotomy wires.   There is stable blunting of the left costophrenic angle suggestive of small effusion or scarring.. There are no confluent infiltrates. There are no acute osseous abnormalities.     IMPRESSION: Cardiomegaly with stable blunting of left  costophrenic angle suggestive of pleural effusion versus scarring       CT Head Without Contrast [[id number]]Collected: 01/27/23 1424     HISTORY: Neuro deficit, acute, stroke suspected     FINDINGS: No prior studies for comparison. There is no acute intracranial hemorrhage, with no mass effect or abnormal extra-axial fluid. Gray white differentiation is maintained, with the cortical sulci, ventricles and basal cisterns normal in size for the patient's age.     The cerebellum and brainstem are unremarkable. There is paranasal sinus mucosal thickening, with no sinus air-fluid levels. The mastoid air cells are clear. There is no acute calvarial fracture.     IMPRESSION:   1. Negative noncontrast head CT.   2. Paranasal sinus disease.       CTA Head and Neck (xpd) [[id number]]Collected: 01/27/23 1424     CTA BRAIN:   VASCULAR FINDINGS:   Major intracranial arteries are patent. No large vessel occlusion, intraluminal filling defect, or stenosis. Negative for aneurysm or vasculitis.     Fetal origins of bilateral posterior cerebral arteries, normal variant, is noted.     Opacified visualized dural venous sinuses are unremarkable.     NONVASCULAR FINDINGS:   No abnormal intra-axial or extra-axial enhancement. No midline shift. Scattered mild-to-moderate mucosal thickening diffusely affects the frontal, ethmoid, sphenoid, and maxillary sinuses.       CTA NECK:   VASCULAR FINDINGS:   Conventional branch vessel aortic arch anatomy is present.     Left carotid arteries are patent, with minimal partially calcified plaque in left carotid bulb and origin of left internal carotid artery. Left vertebral artery is patent.     Right brachiocephalic and common carotid arteries are patent. Partially calcified plaque in right internal carotid artery origin causes no significant narrowing. Right ECA and branches are patent. Right vertebral artery is patent.     NONVASCULAR FINDINGS:   Cervical soft tissues are unremarkable. No acute  osseous abnormality. Visualized lung apices are clear.     IMPRESSION:     1. Negative CTA brain.   2. Minimal atherosclerotic plaque in the left carotid bulb and origin of left ICA and mild atherosclerotic plaque in right ICA origin, without narrowing.     CTA Chest Aorta Non Coronary [[id number]]Collected: 01/27/23 1424     FINDINGS: Thin axial imaging through the chest was performed with 100 mL Omnipaque 350 IV contrast, with sagittal and coronal reformatted images and 3-D reconstructions performed on an independent workstation, with images stored in the patient's permanent electronic medical record.     The aorta is normal in caliber without aneurysm or dissection. There is a three-vessel aortic arch. The great vessels are patent.   The heart is mildly enlarged without pericardial effusion. The main pulmonary arteries are patent. There are median sternotomy wires.   There is no hilar, mediastinal or axillary adenopathy.   There is a tiny left pleural effusion with left lower lobe atelectasis. The left upper lobe and right lung are clear.     The esophagus is unremarkable. The musculature is normal. There are no acute osseous abnormalities.       IMPRESSION: No evidence of aortic dissection     Prior median sternotomy     Tiny left pleural effusion with left lower lobe atelectasis.       Assessment/Plan:     * Chest pain  BNP 15, initial and repeat troponin are pending, 12 lead EKG with normal sinus rhythm, ventricular rate 68 and  with no abnormalities., monitor on telemetry, consult Cardiology, schedule for stress test in a.m., obtain echocardiogram, Trops negative, repeat agin in 4 hours      Lightheadedness  Patient is complaining of lightheadedness intermittently for the last couple of weeks, negative CT head, consider MRI if warranted, obtain echocardiogram, monitor vital signs frequently, obtain MRI brain, orthostatics and neuro checks      CAD (coronary artery disease)  Status post 1 vessel CABG in  March of 2021, consult Cardiology, see plans above      Hypertension  Resume Toprol, monitor blood pressure every 4 hours, mildly elevated in ED at 157/99, may need to add another agent      Chronic back pain  Resume home meds, morphine 15 mg 3 times a day as needed      Hypercholesterolemia  Cholesterol 127, HDL cholesterol 34, LDL cholesterol 73.4, non HDL cholesterol 93 and triglycerides 98, continue statin        VTE Risk Mitigation (From admission, onward)      None           VTE prophylaxis: SCD's    Patient was examined at 1549    Code status: FULL CODE      Radha Veliz NP  Department of Hospital Medicine   [facility] - Emergency Dept

## 2023-01-27 NOTE — SUBJECTIVE & OBJECTIVE
Past Medical History:   Diagnosis Date    Back pain     Coronary artery disease     High cholesterol     Hypertension     Insomnia        Past Surgical History:   Procedure Laterality Date    ANGIOGRAM, CORONARY, WITH LEFT HEART CATHETERIZATION N/A 3/4/2021    Procedure: Angiogram, Coronary, with Left Heart Cath;  Surgeon: Aquilino Rodriguez MD;  Location: [facility] CATH/EP LAB;  Service: Cardiology;  Laterality: N/A;    BACK SURGERY      CARDIAC CATHETERIZATION      CORONARY ARTERY BYPASS GRAFT      CREATION OF BYPASS OF CORONARY ARTERY USING GRAFT WITHOUT CARDIOPULMONARY PUMP OXYGENATION N/A 3/5/2021    Procedure: CABG, WITHOUT CARDIOPULMONARY PUMP OXYGENATION;  Surgeon: Toy Castillo MD;  Location: [facility] OR;  Service: Cardiothoracic;  Laterality: N/A;    KNEE SURGERY Left        Review of patient's allergies indicates:  No Known Allergies    No current facility-administered medications on file prior to encounter.     Current Outpatient Medications on File Prior to Encounter   Medication Sig    aspirin (ECOTRIN) 81 MG EC tablet Take 81 mg by mouth once daily.    atorvastatin (LIPITOR) 40 MG tablet Take 1 tablet by mouth once daily.    docusate sodium (COLACE) 100 MG capsule Take 1 capsule (100 mg total) by mouth 2 (two) times daily.    metoprolol succinate (TOPROL-XL) 25 MG 24 hr tablet TAKE ONE TABLET BY MOUTH ONCE DAILY    morphine (MSIR) 15 MG tablet Take 15 mg by mouth 3 (three) times daily as needed.    multivit-mins/iron/folic/lycop (CENTRUM ULTRA MEN'S ORAL) Take 1 Dose by mouth once daily.    omeprazole (PRILOSEC) 40 MG capsule Take 40 mg by mouth once daily.    [DISCONTINUED] morphine (MS CONTIN) 15 MG 12 hr tablet Take 15 mg by mouth as needed.    albuterol (PROVENTIL HFA) 90 mcg/actuation inhaler Inhale 2 puffs into the lungs every 6 (six) hours as needed for Wheezing. Rescue    baclofen (LIORESAL) 10 MG tablet Take 10 mg by mouth 2 (two) times daily as needed.    gabapentin (NEURONTIN) 300 MG capsule  Take 300 mg by mouth 2 (two) times daily as needed.    meloxicam (MOBIC) 15 MG tablet Take 15 mg by mouth as needed.    [DISCONTINUED] lisinopriL (PRINIVIL,ZESTRIL) 5 MG tablet Take 2.5 mg by mouth daily as needed.    [DISCONTINUED] magnesium oxide (MAG-OX) 400 mg (241.3 mg magnesium) tablet Take 1 tablet (400 mg total) by mouth once daily.     Family History       Problem Relation (Age of Onset)    Brain cancer Mother    Cancer Father          Tobacco Use    Smoking status: Never    Smokeless tobacco: Never   Substance and Sexual Activity    Alcohol use: No    Drug use: Not on file    Sexual activity: Not on file     Review of Systems   All other systems reviewed and are negative.  Twelve point review of systems obtained and negative except as stated above in HPI      Objective:     Vital Signs (Most Recent):  Temp: 97.9 °F (36.6 °C) (01/27/23 1319)  Pulse: 66 (01/27/23 1526)  Resp: 16 (01/27/23 1526)  BP: 119/66 (01/27/23 1526)  SpO2: 96 % (01/27/23 1526) Vital Signs (24h Range):  Temp:  [97.9 °F (36.6 °C)] 97.9 °F (36.6 °C)  Pulse:  [66-75] 66  Resp:  [16-18] 16  SpO2:  [96 %-100 %] 96 %  BP: (119-157)/(66-99) 119/66     Weight: 90.7 kg (200 lb)  Body mass index is 29.53 kg/m².    Physical Exam  Vitals and nursing note reviewed.   Constitutional:       General: He is not in acute distress.     Appearance: Normal appearance. He is not ill-appearing.      Comments: Sitting up in bed awake and alert, his wife is at bedside, he is in no acute distress.   HENT:      Head: Normocephalic.      Right Ear: External ear normal.      Left Ear: External ear normal.      Nose: Nose normal.      Mouth/Throat:      Mouth: Mucous membranes are moist.      Pharynx: Oropharynx is clear.   Eyes:      Conjunctiva/sclera: Conjunctivae normal.   Cardiovascular:      Rate and Rhythm: Normal rate and regular rhythm.      Pulses: Normal pulses.      Heart sounds: Normal heart sounds. No murmur heard.  Pulmonary:      Effort: Pulmonary  effort is normal.      Breath sounds: Normal breath sounds.   Abdominal:      General: Abdomen is protuberant. Bowel sounds are normal. There is no distension.      Palpations: Abdomen is soft.   Musculoskeletal:      Cervical back: Normal range of motion.      Right lower leg: No edema.      Left lower leg: No edema.      Comments: Moves all extremities with good equal strength, no edema noted.   Skin:     General: Skin is warm and dry.   Neurological:      General: No focal deficit present.      Mental Status: He is alert and oriented to person, place, and time.   Psychiatric:         Mood and Affect: Mood normal.         Behavior: Behavior normal.         Thought Content: Thought content normal.         Judgment: Judgment normal.           Significant Labs: All pertinent labs within the past 24 hours have been reviewed.    CBC:   Recent Labs   Lab 01/27/23  1329   WBC 8.66   HGB 14.5   HCT 45.1        CMP:   Recent Labs   Lab 01/27/23  1329      K 4.4      CO2 28      BUN 15   CREATININE 1.1   CALCIUM 9.5   PROT 7.7   ALBUMIN 4.6   BILITOT 0.9   ALKPHOS 79   AST 21   ALT 21   ANIONGAP 8       Lipid Panel:   Recent Labs   Lab 01/27/23  1329   CHOL 127   HDL 34*   LDLCALC 73.4   TRIG 98   CHOLHDL 26.8       TSH:   Recent Labs   Lab 01/27/23  1329   TSH 1.700         Significant Imaging: I have reviewed all pertinent imaging results/findings within the past 24 hours.

## 2023-01-27 NOTE — ASSESSMENT & PLAN NOTE
Resume Toprol, monitor blood pressure every 4 hours, mildly elevated in ED at 157/99, may need to add another agent

## 2023-01-27 NOTE — ASSESSMENT & PLAN NOTE
Patient is complaining of lightheadedness intermittently for the last couple of weeks, negative CT head, consider MRI if warranted, obtain echocardiogram, monitor vital signs frequently

## 2023-01-27 NOTE — ED PROVIDER NOTES
Encounter Date: 1/27/2023       History     Chief Complaint   Patient presents with    Arm Pain    Neck Pain     57-year-old male sent by cardiologist's office to be evaluated as patient having intermittent episodes of bilateral arm pain that radiates into the chest and into the neck which feels like pressure and tightness and patient complains of feeling dizzy intermittently when this gets worse.  Denies fever or chills or nausea vomiting.  Denies any headache.  Denies any focal weakness or numbness.  Denies dysuria or hematuria.  Denies any confusion.  Patient has history of coronary artery disease in so was concerned and went to see the cardiologist who sent him to the emergency department for further evaluation and also mentioned he may be having a stroke and sent here for evaluation of that as well.    Review of patient's allergies indicates:  No Known Allergies  Past Medical History:   Diagnosis Date    Back pain     Coronary artery disease     High cholesterol     Hypertension     Insomnia      Past Surgical History:   Procedure Laterality Date    ANGIOGRAM, CORONARY, WITH LEFT HEART CATHETERIZATION N/A 3/4/2021    Procedure: Angiogram, Coronary, with Left Heart Cath;  Surgeon: Aquilino Rodriguez MD;  Location: [facility] CATH/EP LAB;  Service: Cardiology;  Laterality: N/A;    BACK SURGERY      CARDIAC CATHETERIZATION      CORONARY ARTERY BYPASS GRAFT      CREATION OF BYPASS OF CORONARY ARTERY USING GRAFT WITHOUT CARDIOPULMONARY PUMP OXYGENATION N/A 3/5/2021    Procedure: CABG, WITHOUT CARDIOPULMONARY PUMP OXYGENATION;  Surgeon: Toy Castillo MD;  Location: [facility] OR;  Service: Cardiothoracic;  Laterality: N/A;    KNEE SURGERY Left      Family History   Problem Relation Age of Onset    Cancer Father         colon cancer    Brain cancer Mother         tumor     Social History     Tobacco Use    Smoking status: Never    Smokeless tobacco: Never   Substance Use Topics    Alcohol use: No     Review of Systems    Constitutional: Negative.    HENT: Negative.     Eyes: Negative.    Respiratory: Negative.     Cardiovascular:  Positive for chest pain.   Gastrointestinal: Negative.    Endocrine: Negative.    Genitourinary: Negative.    Musculoskeletal:  Positive for myalgias and neck pain.   Skin: Negative.    Allergic/Immunologic: Negative.    Neurological: Negative.    Hematological: Negative.    Psychiatric/Behavioral: Negative.     All other systems reviewed and are negative.    Physical Exam     Initial Vitals [01/27/23 1319]   BP Pulse Resp Temp SpO2   (!) 157/99 75 18 97.9 °F (36.6 °C) 100 %      MAP       --         Physical Exam    Nursing note and vitals reviewed.  Constitutional: He appears well-developed and well-nourished.   HENT:   Head: Normocephalic and atraumatic.   Nose: Nose normal.   Eyes: Conjunctivae and EOM are normal.   Neck: Neck supple. No tracheal deviation present.   Normal range of motion.  Cardiovascular:  Normal rate, regular rhythm, normal heart sounds and intact distal pulses.     Exam reveals no friction rub.       No murmur heard.  Pulmonary/Chest: Breath sounds normal. No respiratory distress. He has no wheezes. He has no rales. He exhibits no tenderness.   Abdominal: Abdomen is soft. He exhibits no distension. There is no abdominal tenderness.   Musculoskeletal:         General: No tenderness. Normal range of motion.      Cervical back: Normal range of motion and neck supple.     Neurological: He is alert and oriented to person, place, and time. He has normal strength. No cranial nerve deficit or sensory deficit. GCS score is 15. GCS eye subscore is 4. GCS verbal subscore is 5. GCS motor subscore is 6.   Skin: Skin is warm and dry. Capillary refill takes less than 2 seconds.   Psychiatric: He has a normal mood and affect. Thought content normal.       ED Course   Critical Care    Date/Time: 1/27/2023 2:58 PM  Performed by: Katy Colin MD  Authorized by: Katy Colin MD   Direct patient  critical care time: 10 minutes  Total critical care time (exclusive of procedural time) : 10 minutes      Labs Reviewed   LIPID PANEL - Abnormal; Notable for the following components:       Result Value    HDL 34 (*)     All other components within normal limits   CBC W/ AUTO DIFFERENTIAL   COMPREHENSIVE METABOLIC PANEL   PROTIME-INR   TSH   TROPONIN I HIGH SENSITIVITY   B-TYPE NATRIURETIC PEPTIDE   B-TYPE NATRIURETIC PEPTIDE   TROPONIN I HIGH SENSITIVITY   TROPONIN I HIGH SENSITIVITY   ISTAT CREATININE   POCT GLUCOSE   POCT GLUCOSE MONITORING CONTINUOUS   POCT CREATININE     EKG Readings: (Independently Interpreted)   Rhythm: Normal Sinus Rhythm. Ectopy: No Ectopy. Conduction: Normal. ST Segments: Normal ST Segments. T Waves: Normal. Clinical Impression: Normal Sinus Rhythm   ECG Results              ECG 12 lead (In process)  Result time 01/27/23 13:56:55      In process by Interface, Lab In [facility] (01/27/23 13:56:55)                   Narrative:    Test Reason : I63.9,    Vent. Rate : 068 BPM     Atrial Rate : 068 BPM     P-R Int : 158 ms          QRS Dur : 086 ms      QT Int : 410 ms       P-R-T Axes : 042 016 052 degrees     QTc Int : 435 ms    Normal sinus rhythm  Normal ECG  When compared with ECG of 05-MAR-2021 11:14,  No significant change was found    Referred By: [email or web address]   SELF           Confirmed By:                                   Imaging Results              CTA Chest Aorta Non Coronary (Final result)  Result time 01/27/23 14:54:18      Final result by Jena Hodges MD (01/27/23 14:54:18)                   Narrative:    All CT scans at this facility used dose modulation, iterative reconstruction and/or weight-based dosing when appropriate to reduce radiation doses  as low as reasonably achievable.    HISTORY: : Neck pain. Prior CABG    FINDINGS: Thin axial imaging through the chest was performed with 100 mL Omnipaque 350 IV contrast, with sagittal and coronal reformatted images and 3-D  reconstructions performed on an independent workstation, with images stored in the patient's permanent electronic medical record.    The aorta is normal in caliber without aneurysm or dissection. There is a three-vessel aortic arch. The great vessels are patent.  The heart is mildly enlarged without pericardial effusion. The main pulmonary arteries are patent. There are median sternotomy wires.  There is no hilar, mediastinal or axillary adenopathy.  There is a tiny left pleural effusion with left lower lobe atelectasis. The left upper lobe and right lung are clear.    The esophagus is unremarkable. The musculature is normal. There are no acute osseous abnormalities.      IMPRESSION: No evidence of aortic dissection    Prior median sternotomy    Tiny left pleural effusion with left lower lobe atelectasis.    Electronically signed by:  Jena Hodges MD  1/27/2023 2:54 PM CST Workstation: [id number]                                     CTA Head and Neck (xpd) (In process)                      CT Head Without Contrast (Final result)  Result time 01/27/23 14:43:31      Final result by Parveen Young MD (01/27/23 14:43:31)                   Narrative:    CMS MANDATED QUALITY DATA-CT RADIATION DOSE-436  All CT scans at this facility dose modulation, iterative reconstruction, and or weight-based dosing when appropriate to reduce radiation dose to as low as reasonably achievable.    HISTORY: Neuro deficit, acute, stroke suspected    FINDINGS: No prior studies for comparison. There is no acute intracranial hemorrhage, with no mass effect or abnormal extra-axial fluid. Gray white differentiation is maintained, with the cortical sulci, ventricles and basal cisterns normal in size for the patient's age.    The cerebellum and brainstem are unremarkable. There is paranasal sinus mucosal thickening, with no sinus air-fluid levels. The mastoid air cells are clear. There is no acute calvarial fracture.    IMPRESSION:  1.  Negative noncontrast head CT.  2. Paranasal sinus disease.    Electronically signed by:  Parveen Young MD  1/27/2023 2:43 PM CST Workstation: [id number]                                     X-Ray Chest AP Portable (Final result)  Result time 01/27/23 14:15:21      Final result by Jena Hodges MD (01/27/23 14:15:21)                   Narrative:    Portable chest x-ray at 1:48 PM is compared to prior study eight 7/7/2021    Clinical history is chest pain    The heart is enlarged. There are median sternotomy wires.  There is stable blunting of the left costophrenic angle suggestive of small effusion or scarring.. There are no confluent infiltrates. There are no acute osseous abnormalities.    IMPRESSION: Cardiomegaly with stable blunting of left costophrenic angle suggestive of pleural effusion versus scarring    Electronically signed by:  Jena Hodges MD  1/27/2023 2:15 PM CST Workstation: [id number]                                  X-Rays:   Independently Interpreted Readings:   Other Readings:  Chest x-ray unremarkable.  Head CT unremarkable.  Medications   iohexoL (OMNIPAQUE 350) injection 100 mL (100 mLs Intravenous Given 1/27/23 1418)   aspirin tablet 325 mg (325 mg Oral Given 1/27/23 1345)     Medical Decision Making:   Initial Assessment:   57-year-old male presented emergency department with discomfort across the chest and pain in the arms and neck.  Denies any trauma.  Patient has dizziness associated with these symptoms and was sent here for possible stroke.  I do not believe patient has a stroke as patient does not have any neurologic deficits that are focal.  Patient has history of coronary artery disease so screening cardiac workup done.  CT of the head and CTA done.  Patient having these symptoms for the past 2 days and no evidence of stroke on the CT.  Given this presentation screening cardiac workup done and aspirin given.  Hospital Medicine consulted for evaluation for admission as this  could be cardiac in nature.  Patient's troponin and EKG unremarkable at this time.  Clinical Tests:   Lab Tests: Reviewed  Radiological Study: Reviewed  Medical Tests: Reviewed                        Clinical Impression:   Final diagnoses:  [R07.9] Chest pain  [M54.10] Radiculopathy, unspecified spinal region (Primary)        ED Disposition Condition    Admit Stable                Katy Colin MD  01/27/23 2992

## 2023-01-27 NOTE — ASSESSMENT & PLAN NOTE
Cholesterol 127, HDL cholesterol 34, LDL cholesterol 73.4, non HDL cholesterol 93 and triglycerides 98, continue statin

## 2023-01-27 NOTE — TELEPHONE ENCOUNTER
----- Message from Stacia Andrewemma sent at 1/27/2023 11:41 AM CST -----  Contact: self  Type:  Same Day Appointment Request    Caller is requesting a same day appointment.  Caller declined first available appointment listed below.      Name of Caller:  Patient  When is the first available appointment?  2/8/23  Symptoms:  fatigue, weird feeling in neck and armpits, tingling  Best Call Back Number:  [phone]  Additional Information:   Patient would like to be seen today. Please call the pt back asap to schedule. Thanks!

## 2023-01-28 ENCOUNTER — HOSPITAL ENCOUNTER (OUTPATIENT)
Dept: RADIOLOGY | Facility: HOSPITAL | Age: 58
Discharge: HOME | End: 2023-01-28
Payer: MEDICARE

## 2023-01-28 ENCOUNTER — CLINICAL SUPPORT (OUTPATIENT)
Dept: CARDIOLOGY | Facility: HOSPITAL | Age: 58
End: 2023-01-28
Payer: MEDICARE

## 2023-01-28 VITALS — HEIGHT: 69 IN | BODY MASS INDEX: 29.03 KG/M2 | WEIGHT: 196 LBS

## 2023-01-28 VITALS
HEART RATE: 67 BPM | OXYGEN SATURATION: 97 % | DIASTOLIC BLOOD PRESSURE: 75 MMHG | SYSTOLIC BLOOD PRESSURE: 123 MMHG | TEMPERATURE: 98 F | RESPIRATION RATE: 18 BRPM | WEIGHT: 196.19 LBS | BODY MASS INDEX: 29.06 KG/M2 | HEIGHT: 69 IN

## 2023-01-28 PROBLEM — R42 LIGHTHEADEDNESS: Status: RESOLVED | Noted: 2023-01-27 | Resolved: 2023-01-28

## 2023-01-28 PROBLEM — R07.9 CHEST PAIN: Status: RESOLVED | Noted: 2021-03-03 | Resolved: 2023-01-28

## 2023-01-28 LAB
ANION GAP SERPL CALC-SCNC: 7 MMOL/L (ref 8–16)
AORTIC ROOT ANNULUS: 3.72 CM
AORTIC VALVE CUSP SEPERATION: 2.45 CM
AV INDEX (PROSTH): 0.9
AV MEAN GRADIENT: 2 MMHG
AV PEAK GRADIENT: 4 MMHG
AV VALVE AREA: 2.84 CM2
AV VELOCITY RATIO: 0.99
BASOPHILS # BLD AUTO: 0.06 K/UL (ref 0–0.2)
BASOPHILS NFR BLD: 0.8 % (ref 0–1.9)
BSA FOR ECHO PROCEDURE: 2.08 M2
BUN SERPL-MCNC: 17 MG/DL (ref 6–20)
CALCIUM SERPL-MCNC: 9 MG/DL (ref 8.7–10.5)
CHLORIDE SERPL-SCNC: 103 MMOL/L (ref 95–110)
CO2 SERPL-SCNC: 28 MMOL/L (ref 23–29)
CREAT SERPL-MCNC: 1.2 MG/DL (ref 0.5–1.4)
CV ECHO LV RWT: 0.48 CM
CV STRESS BASE HR: 63 BPM
DIASTOLIC BLOOD PRESSURE: 85 MMHG
DIFFERENTIAL METHOD: NORMAL
DOP CALC AO PEAK VEL: 1.04 M/S
DOP CALC AO VTI: 18.6 CM
DOP CALC LVOT AREA: 3.1 CM2
DOP CALC LVOT DIAMETER: 2 CM
DOP CALC LVOT PEAK VEL: 1.03 M/S
DOP CALC LVOT STROKE VOLUME: 52.75 CM3
DOP CALCLVOT PEAK VEL VTI: 16.8 CM
E WAVE DECELERATION TIME: 289.92 MSEC
E/A RATIO: 0.93
E/E' RATIO: 7.29 M/S
ECHO LV POSTERIOR WALL: 1.14 CM (ref 0.6–1.1)
EJECTION FRACTION: 65 %
EOSINOPHIL # BLD AUTO: 0.4 K/UL (ref 0–0.5)
EOSINOPHIL NFR BLD: 5.6 % (ref 0–8)
ERYTHROCYTE [DISTWIDTH] IN BLOOD BY AUTOMATED COUNT: 13.2 % (ref 11.5–14.5)
EST. GFR  (NO RACE VARIABLE): >60 ML/MIN/1.73 M^2
ESTIMATED AVG GLUCOSE: 134 MG/DL (ref 68–131)
FRACTIONAL SHORTENING: 40 % (ref 28–44)
GLUCOSE SERPL-MCNC: 134 MG/DL (ref 70–110)
HBA1C MFR BLD: 6.3 % (ref 4.5–6.2)
HCT VFR BLD AUTO: 43.5 % (ref 40–54)
HGB BLD-MCNC: 14 G/DL (ref 14–18)
IMM GRANULOCYTES # BLD AUTO: 0.02 K/UL (ref 0–0.04)
IMM GRANULOCYTES NFR BLD AUTO: 0.3 % (ref 0–0.5)
INTERVENTRICULAR SEPTUM: 1.14 CM (ref 0.6–1.1)
IVRT: 41.44 MSEC
LEFT INTERNAL DIMENSION IN SYSTOLE: 2.86 CM (ref 2.1–4)
LEFT VENTRICLE DIASTOLIC VOLUME INDEX: 51.64 ML/M2
LEFT VENTRICLE DIASTOLIC VOLUME: 105.86 ML
LEFT VENTRICLE MASS INDEX: 98 G/M2
LEFT VENTRICLE SYSTOLIC VOLUME INDEX: 15.2 ML/M2
LEFT VENTRICLE SYSTOLIC VOLUME: 31.1 ML
LEFT VENTRICULAR INTERNAL DIMENSION IN DIASTOLE: 4.77 CM (ref 3.5–6)
LEFT VENTRICULAR MASS: 201.84 G
LV LATERAL E/E' RATIO: 6.2 M/S
LV SEPTAL E/E' RATIO: 8.86 M/S
LVOT MG: 1.82 MMHG
LVOT MV: 0.61 CM/S
LYMPHOCYTES # BLD AUTO: 2 K/UL (ref 1–4.8)
LYMPHOCYTES NFR BLD: 25.8 % (ref 18–48)
MAGNESIUM SERPL-MCNC: 2.3 MG/DL (ref 1.6–2.6)
MCH RBC QN AUTO: 27.5 PG (ref 27–31)
MCHC RBC AUTO-ENTMCNC: 32.2 G/DL (ref 32–36)
MCV RBC AUTO: 86 FL (ref 82–98)
MONOCYTES # BLD AUTO: 0.6 K/UL (ref 0.3–1)
MONOCYTES NFR BLD: 7.5 % (ref 4–15)
MV PEAK A VEL: 0.67 M/S
MV PEAK E VEL: 0.62 M/S
MV STENOSIS PRESSURE HALF TIME: 84.08 MS
MV VALVE AREA P 1/2 METHOD: 2.62 CM2
NEUTROPHILS # BLD AUTO: 4.6 K/UL (ref 1.8–7.7)
NEUTROPHILS NFR BLD: 60 % (ref 38–73)
NRBC BLD-RTO: 0 /100 WBC
OHS CV CPX 1 MINUTE RECOVERY HEART RATE: 128 BPM
OHS CV CPX 85 PERCENT MAX PREDICTED HEART RATE MALE: 139
OHS CV CPX ESTIMATED METS: 7.1
OHS CV CPX MAX PREDICTED HEART RATE: 164
OHS CV CPX PATIENT IS FEMALE: 0
OHS CV CPX PATIENT IS MALE: 1
OHS CV CPX PEAK DIASTOLIC BLOOD PRESSURE: 82 MMHG
OHS CV CPX PEAK HEAR RATE: 144 BPM
OHS CV CPX PEAK RATE PRESSURE PRODUCT: NORMAL
OHS CV CPX PEAK SYSTOLIC BLOOD PRESSURE: 182 MMHG
OHS CV CPX PERCENT MAX PREDICTED HEART RATE ACHIEVED: 88
OHS CV CPX RATE PRESSURE PRODUCT PRESENTING: 8001
PISA TR MAX VEL: 2.47 M/S
PLATELET # BLD AUTO: 211 K/UL (ref 150–450)
PMV BLD AUTO: 11.5 FL (ref 9.2–12.9)
POTASSIUM SERPL-SCNC: 3.9 MMOL/L (ref 3.5–5.1)
PV MV: 0.77 M/S
PV PEAK VELOCITY: 1.14 CM/S
RA PRESSURE: 3 MMHG
RBC # BLD AUTO: 5.09 M/UL (ref 4.6–6.2)
SINUS: 3.52 CM
SODIUM SERPL-SCNC: 138 MMOL/L (ref 136–145)
STJ: 3.9 CM
STRESS ECHO POST EXERCISE DUR MIN: 6 MINUTES
STRESS ECHO POST EXERCISE DUR SEC: 30 SECONDS
SYSTOLIC BLOOD PRESSURE: 127 MMHG
TDI LATERAL: 0.1 M/S
TDI SEPTAL: 0.07 M/S
TDI: 0.09 M/S
TR MAX PG: 24 MMHG
TRICUSPID ANNULAR PLANE SYSTOLIC EXCURSION: 0.92 CM
TROPONIN I SERPL HS-MCNC: 2.8 PG/ML (ref 0–14.9)
TV REST PULMONARY ARTERY PRESSURE: 27 MMHG
WBC # BLD AUTO: 7.72 K/UL (ref 3.9–12.7)

## 2023-01-28 PROCEDURE — 93018 CV STRESS TEST I&R ONLY: CPT | Mod: ,,, | Performed by: INTERNAL MEDICINE

## 2023-01-28 PROCEDURE — A9502 TC99M TETROFOSMIN: HCPCS

## 2023-01-28 PROCEDURE — 99233 SBSQ HOSP IP/OBS HIGH 50: CPT | Mod: 25,,, | Performed by: INTERNAL MEDICINE

## 2023-01-28 PROCEDURE — 93306 ECHO (CUPID ONLY): ICD-10-PCS | Mod: 26,,, | Performed by: INTERNAL MEDICINE

## 2023-01-28 PROCEDURE — 93017 CV STRESS TEST TRACING ONLY: CPT

## 2023-01-28 PROCEDURE — 83735 ASSAY OF MAGNESIUM: CPT | Performed by: NURSE PRACTITIONER

## 2023-01-28 PROCEDURE — 78452 HT MUSCLE IMAGE SPECT MULT: CPT | Mod: TC

## 2023-01-28 PROCEDURE — G0378 HOSPITAL OBSERVATION PER HR: HCPCS

## 2023-01-28 PROCEDURE — 25000003 PHARM REV CODE 250: Performed by: NURSE PRACTITIONER

## 2023-01-28 PROCEDURE — 93306 TTE W/DOPPLER COMPLETE: CPT

## 2023-01-28 PROCEDURE — 99233 PR SUBSEQUENT HOSPITAL CARE,LEVL III: ICD-10-PCS | Mod: 25,,, | Performed by: INTERNAL MEDICINE

## 2023-01-28 PROCEDURE — 93306 TTE W/DOPPLER COMPLETE: CPT | Mod: 26,,, | Performed by: INTERNAL MEDICINE

## 2023-01-28 PROCEDURE — 80048 BASIC METABOLIC PNL TOTAL CA: CPT | Performed by: NURSE PRACTITIONER

## 2023-01-28 PROCEDURE — 93016 NUCLEAR STRESS TEST (CUPID ONLY): ICD-10-PCS | Mod: ,,, | Performed by: INTERNAL MEDICINE

## 2023-01-28 PROCEDURE — 36415 COLL VENOUS BLD VENIPUNCTURE: CPT | Performed by: NURSE PRACTITIONER

## 2023-01-28 PROCEDURE — 93018 NUCLEAR STRESS TEST (CUPID ONLY): ICD-10-PCS | Mod: ,,, | Performed by: INTERNAL MEDICINE

## 2023-01-28 PROCEDURE — 83036 HEMOGLOBIN GLYCOSYLATED A1C: CPT | Performed by: NURSE PRACTITIONER

## 2023-01-28 PROCEDURE — 93016 CV STRESS TEST SUPVJ ONLY: CPT | Mod: ,,, | Performed by: INTERNAL MEDICINE

## 2023-01-28 PROCEDURE — 84484 ASSAY OF TROPONIN QUANT: CPT | Performed by: NURSE PRACTITIONER

## 2023-01-28 PROCEDURE — 85025 COMPLETE CBC W/AUTO DIFF WBC: CPT | Performed by: NURSE PRACTITIONER

## 2023-01-28 RX ORDER — LANOLIN ALCOHOL/MO/W.PET/CERES
400 CREAM (GRAM) TOPICAL DAILY
Qty: 30 TABLET | Refills: 0 | Status: SHIPPED | OUTPATIENT
Start: 2023-01-28 | End: 2023-02-23

## 2023-01-28 RX ORDER — CLOPIDOGREL BISULFATE 75 MG/1
75 TABLET ORAL DAILY
Qty: 30 TABLET | Refills: 0 | Status: SHIPPED | OUTPATIENT
Start: 2023-01-28 | End: 2023-06-01

## 2023-01-28 RX ORDER — DILTIAZEM HYDROCHLORIDE 180 MG/1
180 CAPSULE, COATED, EXTENDED RELEASE ORAL DAILY
Qty: 30 CAPSULE | Refills: 0 | Status: SHIPPED | OUTPATIENT
Start: 2023-01-28 | End: 2023-02-23

## 2023-01-28 RX ADMIN — MORPHINE SULFATE 15 MG: 15 TABLET ORAL at 12:01

## 2023-01-28 RX ADMIN — ASPIRIN 81 MG: 81 TABLET, COATED ORAL at 12:01

## 2023-01-28 RX ADMIN — METOPROLOL SUCCINATE 25 MG: 25 TABLET, FILM COATED, EXTENDED RELEASE ORAL at 12:01

## 2023-01-28 RX ADMIN — MORPHINE SULFATE 15 MG: 15 TABLET ORAL at 05:01

## 2023-01-28 NOTE — NURSING
Discharge instructions given to patient. Patient verbalized understanding of instructions. PIV removed without difficulty, catheter tip intact. Tele removed. Patient to discharge home via private vehicle.

## 2023-01-28 NOTE — NURSING
Patient down to nuclear medicine for stress testing at 0810.    Patient back from stress test at 1118. Released from NPO status.

## 2023-01-28 NOTE — CONSULTS
[facility]  Department of Cardiology  Consult Note      PATIENT NAME: Jose Alberto Hager  MRN: [id number]  TODAY'S DATE: 01/28/2023  ADMIT DATE: 1/27/2023                          CONSULT REQUESTED BY: Cher Driscoll MD    SUBJECTIVE     PRINCIPAL PROBLEM: Chest pain      REASON FOR CONSULT:      Chest Pain      HPI:    57 year old male known to Dr. Rodriguez. He has a history of CAD and CABG in 2001. He has been having chest tightness and SOB with exertion for the past few weeks it radiates to the back and up to the neck and  gets worse with exertion. Better with rest. Currently CP free. I have seen and examined him in stress lab.       Principal Problem:Chest pain     Chief Complaint:       Chief Complaint   Patient presents with    Arm Pain    Neck Pain         HPI: Jose Alberto Hager is a 57-year-old male with chronic medical problems including CAD status post CABG, hypertension and hyperlipidemia who presents to the emergency room complaining of intermittent chest pain for the last couple of weeks.  Patient states that he has been having intermittent pain under bilateral armpits that is random and not associated with any specific activity.  Associated symptoms are left-sided chest pain that goes from the front through to the back, fatigue and lightheadedness.  It feels like it is sticking in his chest and occasionally goes up into his neck.  He said symptoms last for a little while and then resolve on their own.  Said he does not specifically feel short of breath but he feels like he can not take a deep breath when it occurs.  No associated nausea or diaphoresis.  He denies any other symptoms such as headache, numbness or tingling, lateralized weakness, vision or speech changes, nausea vomiting, abdominal pain, swelling or palpitations.        In the ED, chest x-ray with cardiomegaly and possible small effusion.  lab work unremarkable, CTA chest with no evidence of aortic dissection, tiny left  pleural effusion, CT head negative, CTA head and neck with minimal atherosclerotic plaque in the left carotid bulb and origin of the left ICA and mild atherosclerotic plaque in the right ICA origin without narrowing.  Vital signs stable with blood pressure mildly elevated at 150 7/99, heart rate 75, O2 sat 100% on room air.  He was given aspirin 325 mg.  He will be admitted to the hospitalist service with consult to Cardiology.         Review of patient's allergies indicates:  No Known Allergies    Past Medical History:   Diagnosis Date    Back pain     Coronary artery disease     High cholesterol     Hypertension     Insomnia      Past Surgical History:   Procedure Laterality Date    ANGIOGRAM, CORONARY, WITH LEFT HEART CATHETERIZATION N/A 3/4/2021    Procedure: Angiogram, Coronary, with Left Heart Cath;  Surgeon: Aquilino Rodriguez MD;  Location: [facility] CATH/EP LAB;  Service: Cardiology;  Laterality: N/A;    BACK SURGERY      CARDIAC CATHETERIZATION      CORONARY ARTERY BYPASS GRAFT      CREATION OF BYPASS OF CORONARY ARTERY USING GRAFT WITHOUT CARDIOPULMONARY PUMP OXYGENATION N/A 3/5/2021    Procedure: CABG, WITHOUT CARDIOPULMONARY PUMP OXYGENATION;  Surgeon: Toy Castillo MD;  Location: [facility] OR;  Service: Cardiothoracic;  Laterality: N/A;    KNEE SURGERY Left      Social History     Tobacco Use    Smoking status: Never    Smokeless tobacco: Never   Substance Use Topics    Alcohol use: No        REVIEW OF SYSTEMS  CONSTITUTIONAL: Negative for chills, fatigue and fever.   EYES: No double vision, No blurred vision  NEURO: No headaches, No dizziness  RESPIRATORY: sob with exertion  CARDIOVASCULAR: +cp radiation to neck and back  GI: Negative for abdominal pain, No melena, diarrhea, nausea and vomiting.   : Negative for dysuria and frequency, Negative for hematuria  SKIN: Negative for bruising, Negative for edema or discoloration noted.   ENDOCRINE: Negative for polyphagia, Negative for heat intolerance,  Negative for cold intolerance  PSYCHIATRIC: Negative for depression, Negative for anxiety, Negative for memory loss  MUSCULOSKELETAL: Negative for neck pain, Negative for muscle weakness, Negative for back pain     OBJECTIVE     VITAL SIGNS (Most Recent)  Temp: 97.9 °F (36.6 °C) (01/28/23 0744)  Pulse: 67 (01/28/23 0744)  Resp: 18 (01/28/23 0744)  BP: (!) 115/90 (01/28/23 0744)  SpO2: 98 % (01/28/23 0744)    VENTILATION STATUS  Resp: 18 (01/28/23 0744)  SpO2: 98 % (01/28/23 0744)       I & O (Last 24H):No intake or output data in the 24 hours ending 01/28/23 0916    WEIGHTS  Wt Readings from Last 3 Encounters:   01/28/23 0415 89 kg (196 lb 3.4 oz)   01/27/23 1810 90.7 kg (200 lb)   01/27/23 1319 90.7 kg (200 lb)   05/02/22 1441 91.2 kg (201 lb)   11/01/21 1614 90.7 kg (200 lb)       PHYSICAL EXAM  GENERAL: well built, well nourished, well-developed in no apparent distress alert and oriented.   HEENT: Normocephalic. Pupils normal and conjunctivae normal.  Mucous membranes normal, no cyanosis or icterus, trachea central,no pallor or icterus is noted..   NECK: No JVD. No bruit..   THYROID: Thyroid not enlarged. No nodules present..   CARDIAC: Regular rate and rhythm. S1 is normal.S2 is normal.No gallops, clicks or murmurs noted at this time.  CHEST ANATOMY: normal.   LUNGS: Clear to auscultation. No wheezing or rhonchi..   ABDOMEN: Soft no masses or organomegaly.  No abdomen pulsations or bruits.  Normal bowel sounds. No pulsations and no masses felt, No guarding or rebound.   URINARY: No lynn catheter   EXTREMITIES: No cyanosis, clubbing or edema noted at this time., no calf tenderness bilaterally.   PERIPHERAL VASCULAR SYSTEM: Good palpable distal pulses.   CENTRAL NERVOUS SYSTEM: No focal motor or sensory deficits noted.   SKIN: Skin without lesions, moist, well perfused.   MUSCLE STRENGTH & TONE: No noteable weakness, atrophy or abnormal movement.     HOME MEDICATIONS:  No current facility-administered medications  on file prior to encounter.     Current Outpatient Medications on File Prior to Encounter   Medication Sig Dispense Refill    aspirin (ECOTRIN) 81 MG EC tablet Take 81 mg by mouth once daily.      atorvastatin (LIPITOR) 40 MG tablet Take 1 tablet by mouth once daily.      docusate sodium (COLACE) 100 MG capsule Take 1 capsule (100 mg total) by mouth 2 (two) times daily. 60 capsule 0    metoprolol succinate (TOPROL-XL) 25 MG 24 hr tablet TAKE ONE TABLET BY MOUTH ONCE DAILY 30 tablet 11    morphine (MSIR) 15 MG tablet Take 15 mg by mouth 3 (three) times daily as needed.      multivit-mins/iron/folic/lycop (CENTRUM ULTRA MEN'S ORAL) Take 1 Dose by mouth once daily.      omeprazole (PRILOSEC) 40 MG capsule Take 40 mg by mouth once daily.  6    albuterol (PROVENTIL HFA) 90 mcg/actuation inhaler Inhale 2 puffs into the lungs every 6 (six) hours as needed for Wheezing. Rescue 18 g 0    baclofen (LIORESAL) 10 MG tablet Take 10 mg by mouth 2 (two) times daily as needed.      gabapentin (NEURONTIN) 300 MG capsule Take 300 mg by mouth 2 (two) times daily as needed.      meloxicam (MOBIC) 15 MG tablet Take 15 mg by mouth as needed.  2       SCHEDULED MEDS:   aspirin  81 mg Oral Daily    atorvastatin  40 mg Oral QHS    docusate sodium  100 mg Oral BID    metoprolol succinate  25 mg Oral Daily       CONTINUOUS INFUSIONS:    PRN MEDS:acetaminophen, melatonin, morphine, naloxone, ondansetron, polyethylene glycol, simethicone, sodium chloride 0.9%    LABS AND DIAGNOSTICS     CBC LAST 3 DAYS  Recent Labs   Lab 01/27/23  1329 01/28/23  0456   WBC 8.66 7.72   RBC 5.33 5.09   HGB 14.5 14.0   HCT 45.1 43.5   MCV 85 86   MCH 27.2 27.5   MCHC 32.2 32.2   RDW 13.2 13.2    211   MPV 11.0 11.5   GRAN 55.3  4.8 60.0  4.6   LYMPH 30.6  2.7 25.8  2.0   MONO 7.7  0.7 7.5  0.6   BASO 0.06 0.06   NRBC 0 0       COAGULATION LAST 3 DAYS  Recent Labs   Lab 01/27/23  1329   INR 1.0       CHEMISTRY LAST 3 DAYS  Recent Labs   Lab  01/27/23  1329 01/28/23  0456    138   K 4.4 3.9    103   CO2 28 28   ANIONGAP 8 7*   BUN 15 17   CREATININE 1.1 1.2    134*   CALCIUM 9.5 9.0   MG  --  2.3   ALBUMIN 4.6  --    PROT 7.7  --    ALKPHOS 79  --    ALT 21  --    AST 21  --    BILITOT 0.9  --        CARDIAC PROFILE LAST 3 DAYS  Recent Labs   Lab 01/27/23  1329 01/27/23  1528 01/28/23  0456   BNP 15  --   --    TROPONINIHS 5.8 2.7 2.8       ENDOCRINE LAST 3 DAYS  Recent Labs   Lab 01/27/23  1329   TSH 1.700       LAST ARTERIAL BLOOD GAS  ABG  No results for input(s): PH, PO2, PCO2, HCO3, BE in the last 168 hours.    LAST 7 DAYS MICROBIOLOGY   Microbiology Results (last 7 days)       ** No results found for the last 168 hours. **            MOST RECENT IMAGING  MRI Brain Without Contrast  REASON: Stroke, follow up Neck pain, stroke    TECHNIQUE: Multiplanar and multi sequential MRI of the brain, without IV contrast.    COMPARISON: None    FINDINGS:    Gray-white matter distinction is preserved throughout the brain parenchyma. The ventricles are midline without mass effect. No areas of restricted diffusion or hemorrhage identified. No intra or extra-axial fluid collections or mass. Vascular flow voids are within normal limits. Calvarial bone marrow signal is normal.    IMPRESSION:    No acute intracranial abnormality.    Electronically signed by:  Denis Barnes DO  1/27/2023 7:19 PM CST Workstation: [id number]  CTA Head and Neck (xpd)  CMS MANDATED QUALITY DATA - CT RADIATION - 436    All CT scans at this facility utilize dose modulation, iterative reconstruction, and/or weight based dosing when appropriate to reduce radiation dose to as low as reasonably achievable.    CMS MANDATED QUALITY DATA - CAROTID - 195    All measurements and percent stenosis described below were determined using NASCET criteria or criteria similar to NASCET, as defined by the Society of Radiologists in Ultrasound Consensus Conference, Radiology,  2003    Reason: Stroke/TIA, determine embolic source Neuro deficit, acute, stroke suspected    Technique: CT angiography of brain and neck with 100 mL Omnipaque 350.  Maximum intensity projection coronal reformations were obtained at a separate workstation and stored in the patient's permanent medical record.    COMPARISON: None    CTA BRAIN:  VASCULAR FINDINGS:  Major intracranial arteries are patent. No large vessel occlusion, intraluminal filling defect, or stenosis. Negative for aneurysm or vasculitis.    Fetal origins of bilateral posterior cerebral arteries, normal variant, is noted.    Opacified visualized dural venous sinuses are unremarkable.    NONVASCULAR FINDINGS:  No abnormal intra-axial or extra-axial enhancement. No midline shift. Scattered mild-to-moderate mucosal thickening diffusely affects the frontal, ethmoid, sphenoid, and maxillary sinuses.    CTA NECK:  VASCULAR FINDINGS:  Conventional branch vessel aortic arch anatomy is present.    Left carotid arteries are patent, with minimal partially calcified plaque in left carotid bulb and origin of left internal carotid artery. Left vertebral artery is patent.    Right brachiocephalic and common carotid arteries are patent. Partially calcified plaque in right internal carotid artery origin causes no significant narrowing. Right ECA and branches are patent. Right vertebral artery is patent.    NONVASCULAR FINDINGS:  Cervical soft tissues are unremarkable. No acute osseous abnormality. Visualized lung apices are clear.    IMPRESSION:    1. Negative CTA brain.  2. Minimal atherosclerotic plaque in the left carotid bulb and origin of left ICA and mild atherosclerotic plaque in right ICA origin, without narrowing.    Electronically signed by:  Bruce Quijano MD  1/27/2023 3:01 PM [facility] Workstation: [id number]  CTA Chest Aorta Non Coronary  All CT scans at this facility used dose modulation, iterative reconstruction and/or weight-based dosing when appropriate  to reduce radiation doses  as low as reasonably achievable.    HISTORY: : Neck pain. Prior CABG    FINDINGS: Thin axial imaging through the chest was performed with 100 mL Omnipaque 350 IV contrast, with sagittal and coronal reformatted images and 3-D reconstructions performed on an independent workstation, with images stored in the patient's permanent electronic medical record.    The aorta is normal in caliber without aneurysm or dissection. There is a three-vessel aortic arch. The great vessels are patent.  The heart is mildly enlarged without pericardial effusion. The main pulmonary arteries are patent. There are median sternotomy wires.  There is no hilar, mediastinal or axillary adenopathy.  There is a tiny left pleural effusion with left lower lobe atelectasis. The left upper lobe and right lung are clear.    The esophagus is unremarkable. The musculature is normal. There are no acute osseous abnormalities.    IMPRESSION: No evidence of aortic dissection    Prior median sternotomy    Tiny left pleural effusion with left lower lobe atelectasis.    Electronically signed by:  Jena Hodges MD  1/27/2023 2:54 PM [facility] Workstation: [id number]  CT Head Without Contrast  CMS MANDATED QUALITY DATA-CT RADIATION DOSE-436  All CT scans at this facility dose modulation, iterative reconstruction, and or weight-based dosing when appropriate to reduce radiation dose to as low as reasonably achievable.    HISTORY: Neuro deficit, acute, stroke suspected    FINDINGS: No prior studies for comparison. There is no acute intracranial hemorrhage, with no mass effect or abnormal extra-axial fluid. Gray white differentiation is maintained, with the cortical sulci, ventricles and basal cisterns normal in size for the patient's age.    The cerebellum and brainstem are unremarkable. There is paranasal sinus mucosal thickening, with no sinus air-fluid levels. The mastoid air cells are clear. There is no acute calvarial  fracture.    IMPRESSION:  1. Negative noncontrast head CT.  2. Paranasal sinus disease.    Electronically signed by:  Parveen Young MD  1/27/2023 2:43 PM CST Workstation: [id number]  X-Ray Chest AP Portable  Portable chest x-ray at 1:48 PM is compared to prior study eight 7/7/2021    Clinical history is chest pain    The heart is enlarged. There are median sternotomy wires.  There is stable blunting of the left costophrenic angle suggestive of small effusion or scarring.. There are no confluent infiltrates. There are no acute osseous abnormalities.    IMPRESSION: Cardiomegaly with stable blunting of left costophrenic angle suggestive of pleural effusion versus scarring    Electronically signed by:  Jena Hodges MD  1/27/2023 2:15 PM CST Workstation: [id number]      ECHOCARDIOGRAM RESULTS (last 5)  Results for orders placed during the hospital encounter of 03/03/21    Stress Echo Which stress agent will be used? Treadmill Exercise; Color Flow Doppler? Yes    Interpretation Summary  · The left ventricle is normal in size with normal systolic function. The estimated ejection fraction is 55%  · Normal left ventricular diastolic function.  · Normal right ventricular size with normal right ventricular systolic function.  · The stress echo portion of this study is negative for myocardial ischemia.  · The patient's exercise capacity was normal.  · The patient reached the end of the protocol.  · There were no arrhythmias during stress.  · The ECG portion of this study is positive for myocardial ischemia.      CURRENT/PREVIOUS VISIT EKG  Results for orders placed or performed during the hospital encounter of 01/27/23   ECG 12 lead    Collection Time: 01/27/23  1:30 PM    Narrative    Test Reason : I63.9,    Vent. Rate : 068 BPM     Atrial Rate : 068 BPM     P-R Int : 158 ms          QRS Dur : 086 ms      QT Int : 410 ms       P-R-T Axes : 042 016 052 degrees     QTc Int : 435 ms    Normal sinus rhythm  Normal ECG  When  compared with ECG of 05-MAR-2021 11:14,  No significant change was found    Referred By: [email or web address]   SELF           Confirmed By:            ASSESSMENT/PLAN:     Active Hospital Problems    Diagnosis    *Chest pain    Lightheadedness    Chronic back pain    Hypertension    Hypercholesterolemia    CAD (coronary artery disease)     95% occlusion at the ostium of the left anterior descending artery.  50% mid LAD.  Circumflex artery and RCA patent date of coronary arteriogram 03/03/2021         ASSESSMENT & PLAN:     Chest Pain   2. CAD H/O CABG X 1 IN 2021- Dr. Castillo  3.  HTN  4. Dyslipidemia        RECOMMENDATIONS:    Await Myoview  Further recommendations based upon this  Thank you for the consultation.       Chiqui Billingsley NP  [facility]  Department of Cardiology  Date of Service: 01/28/2023      PATIENT IS A 57-YEAR-OLD GENTLEMAN WITH HISTORY OF SINGLE-VESSEL BYPASS SURGERY with LIMA to LAD for ostial LAD stenosis.  He was admitted with recurrent episodes of chest discomfort described as tightness and fullness in his upper chest as a bandlike sensation and substernal heaviness with some radiation to the neck and jaw.  This has been occurring with increasing frequency in the past 2 weeks and he sought evaluation for the same with a prolonged episode of pain.  Since his admission his cardiac troponins have been negative.  CT scan was unremarkable  Myocardial perfusion study was performed with no evidence of reversible ischemia.  Normal wall motion on gated imaging study.      NM Myocardial Perfusion Spect Multi Exer  Order: [id number]  Status: Final result     Visible to patient: No (inaccessible in Patient Portal)     Next appt: None     0 Result Notes  Details    Reading Physician Reading Date Result Priority   Denis Barnes MD  [phone] 1/28/2023      Narrative & Impression  Reason: Chest pain/anginal equiv, prior revascularization     Radiopharmaceutical: 11 mCi Technetium 99m Myoview  utilized for rest imaging. 28 mCi Technetium 99m Myoview utilized for stress imaging.0.4 mg Lexiscan administered for pharmacologic stress.     FINDINGS:     Tomographic images reveal uniform radiopharmaceutical distribution throughout the left ventricular myocardium on stress and rest images. No reversible perfusion defect to suggest myocardial ischemia.     Gated SPECT images show normal left ventricular wall motion. Calculated left ventricular ejection fraction is 61 %.     IMPRESSION:     1. No scintigraphic evidence of myocardial ischemia.  2. Normal left ventricular wall motion.  3. Calculated left ventricular ejection fraction of 61 %.     Electronically signed by:  Denis Barnes DO  1/28/2023 11:33 AM [facility] Workstation: [id number]           Specimen Collected: 01/28/23 08:46 Last Resulted: 01/28/23 11:33            Review of systems as outlined above   A detailed physical exam was completed and Physical exam was unremarkable.    Upon review of the data recommend the following.    1. Continue on enteric-coated aspirin  2. Consider adding Plavix 75 mg to his regimen.  3. Discontinue Toprol-XL, patient has already received this dose today  4. May consider starting on low doses of Cardizem CD at 120 mg once daily to manage vasospastic component.  5. 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  6. Resume PPI agents as he was taking at home had Prilosec 40 mg a day  7. Add magnesium oxide 400 mg daily to his regimen.  8. Encouraged to follow-up with Dr. ESCOTO in the office.  I have personally interviewed and examined the patient, I have reviewed the Nurse Practitioner's history and physical, assessment, and plan. I have personally evaluated the patient at bedside and agree with the findings and made appropriate changes as necessary in recommendations.    Fredi Rooney MD.  Department of Cardiology  [facility]  1/28/2023

## 2023-01-28 NOTE — NURSING
Received patient to room from ER No complaints at this time. Oriented to room and hospital policies. Safety measures maintained.

## 2023-01-28 NOTE — NURSING
Pt reminded of npo status. Verbalized understanding. Drinks placed away from bedside table. Safety measures maintained    Pt refuses bed alarm despite education on falls and safety. Signed placed on door. Safety measures maintained

## 2023-01-28 NOTE — CONSULTS
I called and spoke to Lucy to fill out the M referral form.      Ivonne Harrington     [facility]  Adult Nutrition   Consult Note (Nutrition Education)     SUMMARY       Dietitian Rounds Brief  Consult for diet education. RD educated patient on heart healthy diet. Reviewed and provided written nutrition therapy education. Provided RD contact information should questions arise.      Ronel Elliott RD 01/28/2023 5:55 PM

## 2023-01-28 NOTE — PLAN OF CARE
01/28/23 1348   Final Note   Assessment Type Final Discharge Note   Anticipated Discharge Disposition Home   What phone number can be called within the next 1-3 days to see how you are doing after discharge? [phone]   Post-Acute Status   Discharge Delays None known at this time     Patient cleared for discharge from case management standpoint.    Follow up appointments scheduled and added to AVS.    Chart and discharge orders reviewed.  Patient discharged home with no further case management needs.

## 2023-01-28 NOTE — PLAN OF CARE
01/28/23 1348   Final Note   Assessment Type Final Discharge Note   Anticipated Discharge Disposition Home   What phone number can be called within the next 1-3 days to see how you are doing after discharge? [phone]   Post-Acute Status   Discharge Delays None known at this time     .clear

## 2023-01-29 NOTE — HOSPITAL COURSE
57-year-old male with chronic medical problems including CAD status post CABG, hypertension and hyperlipidemia who presents to the emergency room complaining of intermittent chest pain for the last couple of weeks with both typical and atypical features.  Serial EKGs and cardiac enzymes ruled out acute coronary syndrome.  Patient underwent stress test which ruled out any reversible ischemia.  He was cleared for discharge by Cardiology.  Unfortunately after patient left the facility I was called by Cardiology to recommend some changes to his cardiac regimen.  We added Plavix, changed his metoprolol to Cardizem CD.  I called patient after discharge and explained this changes to his regimen, prescriptions were sent to his pharmacy electronically.  He was advised to follow-up with his PCP and cardiologist.  Today upon my assessment he denied any active chest pain.     I have seen the patient on the day of discharge and reviewed the discharge instructions as outlined below. Patient verbalized understanding and is aware to contact primary care physician or return to ED if new or worsening symptoms.

## 2023-01-29 NOTE — DISCHARGE SUMMARY
[facility] Medicine  Discharge Summary      Patient Name: Jose Alberto Hager  MRN: [id number]  YOKO: [id number]  Patient Class: OP- Observation  Admission Date: 1/27/2023  Hospital Length of Stay: 0 days  Discharge Date and Time: 1/28/2023  2:27 PM  Attending Physician: No att. providers found   Discharging Provider: Cher Driscoll MD  Primary Care Provider: Tate Malcolm Iv, MD    Primary Care Team: Networked reference to record PCT     HPI:   Jose Alberto Hager is a 57-year-old male with chronic medical problems including CAD status post CABG, hypertension and hyperlipidemia who presents to the emergency room complaining of intermittent chest pain for the last couple of weeks.  Patient states that he has been having intermittent pain under bilateral armpits that is random and not associated with any specific activity.  Associated symptoms are left-sided chest pain that goes from the front through to the back, fatigue and lightheadedness.  It feels like it is sticking in his chest and occasionally goes up into his neck.  He said symptoms last for a little while and then resolve on their own.  Said he does not specifically feel short of breath but he feels like he can not take a deep breath when it occurs.  No associated nausea or diaphoresis.  He denies any other symptoms such as headache, numbness or tingling, lateralized weakness, vision or speech changes, nausea vomiting, abdominal pain, swelling or palpitations.      In the ED, chest x-ray with cardiomegaly and possible small effusion.  lab work unremarkable, CTA chest with no evidence of aortic dissection, tiny left pleural effusion, CT head negative, CTA head and neck with minimal atherosclerotic plaque in the left carotid bulb and origin of the left ICA and mild atherosclerotic plaque in the right ICA origin without narrowing.  Vital signs stable with blood pressure mildly elevated at 150 7/99, heart rate 75, O2 sat 100% on room  air.  He was given aspirin 325 mg.  He will be admitted to the hospitalist service with consult to Cardiology.      * No surgery found *      Hospital Course:   57-year-old male with chronic medical problems including CAD status post CABG, hypertension and hyperlipidemia who presents to the emergency room complaining of intermittent chest pain for the last couple of weeks with both typical and atypical features.  Serial EKGs and cardiac enzymes ruled out acute coronary syndrome.  Patient underwent stress test which ruled out any reversible ischemia.  He was cleared for discharge by Cardiology.  Unfortunately after patient left the facility I was called by Cardiology to recommend some changes to his cardiac regimen.  We added Plavix, changed his metoprolol to Cardizem CD.  I called patient after discharge and explained this changes to his regimen, prescriptions were sent to his pharmacy electronically.  He was advised to follow-up with his PCP and cardiologist.  Today upon my assessment he denied any active chest pain.     I have seen the patient on the day of discharge and reviewed the discharge instructions as outlined below. Patient verbalized understanding and is aware to contact primary care physician or return to ED if new or worsening symptoms.        Goals of Care Treatment Preferences:  Code Status: Full Code      Consults:   Consults (From admission, onward)        Status Ordering Provider     Inpatient consult to Cardiology  Once        Provider:  Aquilino Rodriguez MD    Completed JUAN PABLO MUNOZ          No new Assessment & Plan notes have been filed under this hospital service since the last note was generated.  Service: Hospital Medicine    Final Active Diagnoses:    Diagnosis Date Noted POA    Chronic back pain [M54.9, G89.29] 03/04/2021 Yes    Hypertension [I10] 03/04/2021 Yes    Hypercholesterolemia [E78.00] 03/04/2021 Yes    CAD (coronary artery disease) [I25.10]  Yes      Problems Resolved  During this Admission:    Diagnosis Date Noted Date Resolved POA    PRINCIPAL PROBLEM:  Chest pain [R07.9] 03/03/2021 01/28/2023 Yes    Lightheadedness [R42] 01/27/2023 01/28/2023 Yes       Discharged Condition: good    Disposition: Home or Self Care    Follow Up:   Follow-up Information     Tate Malcolm Iv, MD Follow up in 1 week(s).    Specialty: Family Medicine  Contact information:  [address]   [place] [address]  [phone]             Aquilino Rodriguez MD Follow up in 2 week(s).    Specialties: Interventional Cardiology, Cardiovascular Disease, Cardiology  Contact information:  [address]  [address]  [place] [facility] [address]  [phone]                       Patient Instructions:      Diet Cardiac     Notify your health care provider if you experience any of the following:  severe uncontrolled pain     Notify your health care provider if you experience any of the following:  difficulty breathing or increased cough     Activity as tolerated       Significant Diagnostic Studies: Labs: All labs within the past 24 hours have been reviewed    Pending Diagnostic Studies:     None         Medications:  Reconciled Home Medications:      Medication List      START taking these medications    clopidogreL 75 mg tablet  Commonly known as: PLAVIX  Take 1 tablet (75 mg total) by mouth once daily.     diltiaZEM 180 MG 24 hr capsule  Commonly known as: CARDIZEM CD  Take 1 capsule (180 mg total) by mouth once daily.     magnesium oxide 400 mg (241.3 mg magnesium) tablet  Commonly known as: MAG-OX  Take 1 tablet (400 mg total) by mouth once daily.        CHANGE how you take these medications    aspirin 81 MG EC tablet  Commonly known as: ECOTRIN  Take 81 mg by mouth once daily.  What changed: Another medication with the same name was removed. Continue taking this medication, and follow the directions you see here.     atorvastatin 40 MG tablet  Commonly known as: LIPITOR  Take 1 tablet by  mouth once daily.  What changed: Another medication with the same name was removed. Continue taking this medication, and follow the directions you see here.        CONTINUE taking these medications    CENTRUM ULTRA MEN'S ORAL  Take 1 Dose by mouth once daily.     docusate sodium 100 MG capsule  Commonly known as: COLACE  Take 1 capsule (100 mg total) by mouth 2 (two) times daily.     gabapentin 300 MG capsule  Commonly known as: NEURONTIN  Take 300 mg by mouth 2 (two) times daily as needed.     meloxicam 15 MG tablet  Commonly known as: MOBIC  Take 15 mg by mouth as needed.     morphine 15 MG tablet  Commonly known as: MSIR  Take 15 mg by mouth 3 (three) times daily as needed.     omeprazole 40 MG capsule  Commonly known as: PRILOSEC  Take 40 mg by mouth once daily.        STOP taking these medications    albuterol 90 mcg/actuation inhaler  Commonly known as: PROVENTIL HFA     baclofen 10 MG tablet  Commonly known as: LIORESAL     metoprolol succinate 25 MG 24 hr tablet  Commonly known as: TOPROL-XL            Indwelling Lines/Drains at time of discharge:   Lines/Drains/Airways     None                 Time spent on the discharge of patient: 32 minutes         Cher Driscoll MD  Department of Hospital Medicine  [facility]

## 2023-02-13 ENCOUNTER — OFFICE VISIT (OUTPATIENT)
Dept: CARDIOLOGY | Facility: CLINIC | Age: 58
End: 2023-02-13
Payer: MEDICARE

## 2023-02-13 VITALS
WEIGHT: 201.75 LBS | BODY MASS INDEX: 29.88 KG/M2 | SYSTOLIC BLOOD PRESSURE: 140 MMHG | OXYGEN SATURATION: 98 % | DIASTOLIC BLOOD PRESSURE: 80 MMHG | HEART RATE: 77 BPM | HEIGHT: 69 IN

## 2023-02-13 DIAGNOSIS — E78.00 HYPERCHOLESTEROLEMIA: ICD-10-CM

## 2023-02-13 DIAGNOSIS — I25.10 CORONARY ARTERY DISEASE INVOLVING NATIVE CORONARY ARTERY OF NATIVE HEART WITHOUT ANGINA PECTORIS: ICD-10-CM

## 2023-02-13 PROBLEM — R13.10 DYSPHAGIA: Status: ACTIVE | Noted: 2023-02-13

## 2023-02-13 PROCEDURE — 1160F RVW MEDS BY RX/DR IN RCRD: CPT | Mod: CPTII,S$GLB,, | Performed by: INTERNAL MEDICINE

## 2023-02-13 PROCEDURE — 1159F PR MEDICATION LIST DOCUMENTED IN MEDICAL RECORD: ICD-10-PCS | Mod: CPTII,S$GLB,, | Performed by: INTERNAL MEDICINE

## 2023-02-13 PROCEDURE — 99999 PR PBB SHADOW E&M-EST. PATIENT-LVL IV: CPT | Mod: PBBFAC,,, | Performed by: INTERNAL MEDICINE

## 2023-02-13 PROCEDURE — 3044F PR MOST RECENT HEMOGLOBIN A1C LEVEL <7.0%: ICD-10-PCS | Mod: CPTII,S$GLB,, | Performed by: INTERNAL MEDICINE

## 2023-02-13 PROCEDURE — 99999 PR PBB SHADOW E&M-EST. PATIENT-LVL IV: ICD-10-PCS | Mod: PBBFAC,,, | Performed by: INTERNAL MEDICINE

## 2023-02-13 PROCEDURE — 3077F SYST BP >= 140 MM HG: CPT | Mod: CPTII,S$GLB,, | Performed by: INTERNAL MEDICINE

## 2023-02-13 PROCEDURE — 3079F PR MOST RECENT DIASTOLIC BLOOD PRESSURE 80-89 MM HG: ICD-10-PCS | Mod: CPTII,S$GLB,, | Performed by: INTERNAL MEDICINE

## 2023-02-13 PROCEDURE — 3008F PR BODY MASS INDEX (BMI) DOCUMENTED: ICD-10-PCS | Mod: CPTII,S$GLB,, | Performed by: INTERNAL MEDICINE

## 2023-02-13 PROCEDURE — 99214 PR OFFICE/OUTPT VISIT, EST, LEVL IV, 30-39 MIN: ICD-10-PCS | Mod: S$GLB,,, | Performed by: INTERNAL MEDICINE

## 2023-02-13 PROCEDURE — 99214 OFFICE O/P EST MOD 30 MIN: CPT | Mod: S$GLB,,, | Performed by: INTERNAL MEDICINE

## 2023-02-13 PROCEDURE — 3008F BODY MASS INDEX DOCD: CPT | Mod: CPTII,S$GLB,, | Performed by: INTERNAL MEDICINE

## 2023-02-13 PROCEDURE — 3044F HG A1C LEVEL LT 7.0%: CPT | Mod: CPTII,S$GLB,, | Performed by: INTERNAL MEDICINE

## 2023-02-13 PROCEDURE — 1159F MED LIST DOCD IN RCRD: CPT | Mod: CPTII,S$GLB,, | Performed by: INTERNAL MEDICINE

## 2023-02-13 PROCEDURE — 1160F PR REVIEW ALL MEDS BY PRESCRIBER/CLIN PHARMACIST DOCUMENTED: ICD-10-PCS | Mod: CPTII,S$GLB,, | Performed by: INTERNAL MEDICINE

## 2023-02-13 PROCEDURE — 3079F DIAST BP 80-89 MM HG: CPT | Mod: CPTII,S$GLB,, | Performed by: INTERNAL MEDICINE

## 2023-02-13 PROCEDURE — 3077F PR MOST RECENT SYSTOLIC BLOOD PRESSURE >= 140 MM HG: ICD-10-PCS | Mod: CPTII,S$GLB,, | Performed by: INTERNAL MEDICINE

## 2023-02-13 NOTE — PROGRESS NOTES
Patient ID:  Jose Alberto Hager is a 57 y.o. male who presents for follow-up of Coronary Artery Disease, Results (Stress & echo), and Hospital Follow Up      He was admitted to [facility] on 01/27 discharged on 01/29.  Jose Alberto Hager is a 57-year-old male with chronic medical problems including CAD status post CABG, hypertension and hyperlipidemia who presents to the emergency room complaining of intermittent chest pain for the last couple of weeks.  Patient states that he has been having intermittent pain under bilateral armpits that is random and not associated with any specific activity.  Associated symptoms are left-sided chest pain that goes from the front through to the back, fatigue and lightheadedness.  It feels like it is sticking in his chest and occasionally goes up into his neck.  He said symptoms last for a little while and then resolve on their own.  Said he does not specifically feel short of breath but he feels like he can not take a deep breath when it occurs.  No associated nausea or diaphoresis.  He denies any other symptoms such as headache, numbness or tingling, lateralized weakness, vision or speech changes, nausea vomiting, abdominal pain, swelling or palpitations.   57-year-old male with chronic medical problems including CAD status post CABG, hypertension and hyperlipidemia who presents to the emergency room complaining of intermittent chest pain for the last couple of weeks with both typical and atypical features.  Serial EKGs and cardiac enzymes ruled out acute coronary syndrome.  Patient underwent stress test which ruled out any reversible ischemia.  The metoprolol was changed to diltiazem, Plavix was added to his medical therapy      Past Medical History:   Diagnosis Date    Back pain     Coronary artery disease     High cholesterol     Hypertension     Insomnia         Past Surgical History:   Procedure Laterality Date    ANGIOGRAM, CORONARY, WITH LEFT HEART  "CATHETERIZATION N/A 3/4/2021    Procedure: Angiogram, Coronary, with Left Heart Cath;  Surgeon: Aquilino Rodriguez MD;  Location: [facility] CATH/EP LAB;  Service: Cardiology;  Laterality: N/A;    BACK SURGERY      CARDIAC CATHETERIZATION      CORONARY ARTERY BYPASS GRAFT      CREATION OF BYPASS OF CORONARY ARTERY USING GRAFT WITHOUT CARDIOPULMONARY PUMP OXYGENATION N/A 3/5/2021    Procedure: CABG, WITHOUT CARDIOPULMONARY PUMP OXYGENATION;  Surgeon: Toy Castillo MD;  Location: [facility] OR;  Service: Cardiothoracic;  Laterality: N/A;    KNEE SURGERY Left           Current Outpatient Medications   Medication Instructions    atorvastatin (LIPITOR) 40 MG tablet 1 tablet, Oral, Daily    clopidogreL (PLAVIX) 75 mg, Oral, Daily    diltiaZEM (CARDIZEM CD) 180 mg, Oral, Daily    docusate sodium (COLACE) 100 mg, Oral, 2 times daily    magnesium oxide (MAG-OX) 400 mg, Oral, Daily    meloxicam (MOBIC) 15 mg, Oral, As needed (PRN)    morphine (MSIR) 15 mg, Oral, 3 times daily PRN    multivit-mins/iron/folic/lycop (CENTRUM ULTRA MEN'S ORAL) 1 Dose, Oral, Daily    omeprazole (PRILOSEC) 40 mg, Oral, Daily        Review of patient's allergies indicates:  No Known Allergies     Review of Systems   HENT:  Positive for odynophagia.    Cardiovascular:  Negative for chest pain and palpitations.   Respiratory:  Negative for cough and shortness of breath.    Gastrointestinal:  Positive for dysphagia.      Objective:     Vitals:    02/13/23 0935   BP: (!) 140/80   BP Location: Left arm   Patient Position: Sitting   BP Method: Medium (Manual)   Pulse: 77   SpO2: 98%   Weight: 91.5 kg (201 lb 11.5 oz)   Height: 5' 9" (1.753 m)       Physical Exam  Vitals and nursing note reviewed.   Constitutional:       Appearance: He is well-developed.   HENT:      Head: Normocephalic and atraumatic.   Eyes:      Conjunctiva/sclera: Conjunctivae normal.   Cardiovascular:      Rate and Rhythm: Normal rate and regular rhythm.      Heart sounds: Normal heart " sounds.   Pulmonary:      Effort: Pulmonary effort is normal.      Breath sounds: Normal breath sounds.   Abdominal:      General: Bowel sounds are normal.      Palpations: Abdomen is soft.   Musculoskeletal:         General: Normal range of motion.   Skin:     General: Skin is warm and dry.   Neurological:      Mental Status: He is alert and oriented to person, place, and time.   Psychiatric:         Behavior: Behavior normal.         Thought Content: Thought content normal.         Judgment: Judgment normal.     CMP  Sodium   Date Value Ref Range Status   01/28/2023 138 136 - 145 mmol/L Final     Potassium   Date Value Ref Range Status   01/28/2023 3.9 3.5 - 5.1 mmol/L Final     Chloride   Date Value Ref Range Status   01/28/2023 103 95 - 110 mmol/L Final     CO2   Date Value Ref Range Status   01/28/2023 28 23 - 29 mmol/L Final     Glucose   Date Value Ref Range Status   01/28/2023 134 (H) 70 - 110 mg/dL Final     BUN   Date Value Ref Range Status   01/28/2023 17 6 - 20 mg/dL Final     Creatinine   Date Value Ref Range Status   01/28/2023 1.2 0.5 - 1.4 mg/dL Final     Calcium   Date Value Ref Range Status   01/28/2023 9.0 8.7 - 10.5 mg/dL Final     Total Protein   Date Value Ref Range Status   01/27/2023 7.7 6.0 - 8.4 g/dL Final     Albumin   Date Value Ref Range Status   01/27/2023 4.6 3.5 - 5.2 g/dL Final     Total Bilirubin   Date Value Ref Range Status   01/27/2023 0.9 0.1 - 1.0 mg/dL Final     Comment:     For infants and newborns, interpretation of results should be based  on gestational age, weight and in agreement with clinical  observations.    Premature Infant recommended reference ranges:  Up to 24 hours.............<8.0 mg/dL  Up to 48 hours............<12.0 mg/dL  3-5 days..................<15.0 mg/dL  6-29 days.................<15.0 mg/dL       Alkaline Phosphatase   Date Value Ref Range Status   01/27/2023 79 55 - 135 U/L Final     AST   Date Value Ref Range Status   01/27/2023 21 10 - 40 U/L Final      ALT   Date Value Ref Range Status   01/27/2023 21 10 - 44 U/L Final     Anion Gap   Date Value Ref Range Status   01/28/2023 7 (L) 8 - 16 mmol/L Final     eGFR if    Date Value Ref Range Status   03/08/2021 >60.0 >60 mL/min/1.73 m^2 Final     eGFR if non    Date Value Ref Range Status   03/08/2021 >60.0 >60 mL/min/1.73 m^2 Final     Comment:     Calculation used to obtain the estimated glomerular filtration  rate (eGFR) is the CKD-EPI equation.         BMP  Lab Results   Component Value Date     01/28/2023    K 3.9 01/28/2023     01/28/2023    CO2 28 01/28/2023    BUN 17 01/28/2023    CREATININE 1.2 01/28/2023    CALCIUM 9.0 01/28/2023    ANIONGAP 7 (L) 01/28/2023    ESTGFRAFRICA >60.0 03/08/2021    EGFRNONAA >60.0 03/08/2021      BNP  @LABRCNTIP(BNP,BNPTRIAGEBLO)@   Lab Results   Component Value Date    CHOL 127 01/27/2023    CHOL 191 03/04/2021     Lab Results   Component Value Date    HDL 34 (L) 01/27/2023    HDL 32 (L) 03/04/2021     Lab Results   Component Value Date    LDLCALC 73.4 01/27/2023    LDLCALC 140.4 03/04/2021     Lab Results   Component Value Date    TRIG 98 01/27/2023    TRIG 93 03/04/2021     Lab Results   Component Value Date    CHOLHDL 26.8 01/27/2023    CHOLHDL 16.8 (L) 03/04/2021      Lab Results   Component Value Date    TSH 1.700 01/27/2023     Lab Results   Component Value Date    HGBA1C 6.3 (H) 01/28/2023     Lab Results   Component Value Date    WBC 7.72 01/28/2023    HGB 14.0 01/28/2023    HCT 43.5 01/28/2023    MCV 86 01/28/2023     01/28/2023         Results for orders placed during the hospital encounter of 01/27/23    Echo Saline Bubble? Yes    Interpretation Summary  · The left ventricle is normal in size with mild concentric hypertrophy and normal systolic function.  · There is no evidence of intracardiac shunting.  · The estimated ejection fraction is 65%.  · Normal left ventricular diastolic function.  · Normal right  ventricular size with normal right ventricular systolic function.  · Mild left atrial enlargement.  · Mild mitral regurgitation.  · Normal central venous pressure (3 mmHg).  · The estimated PA systolic pressure is 27 mmHg.  · Mild tricuspid regurgitation.     Results for orders placed during the hospital encounter of 03/03/21    Cardiac catheterization    Conclusion  · The Ost LAD lesion was 95% stenosed.  · The Dist LAD lesion was 50% stenosed.  · The estimated blood loss was <50 mL.  · There was single vessel coronary artery disease.    The procedure log was documented by Documenter: RT Walker and verified by Aquilino Rodriguez MD.    Date: 3/6/2021  Time: 2:36 PM         Assessment:       CAD (coronary artery disease)  Cardia workup showed no evidence of ischemia    Hypercholesterolemia  Controlled on atorvastatin.    Hypertension  On diltiazem    Dysphagia  Will see a gastroenterologist.  Apparently he had an EGD done in the past.       Plan:       See gastroenterologist.  And follow-up with his primary care physician.  Continue the current medical therapy.  He will be seen in the office in 6 months.  Blood work will be obtained from his primary care physician Dr. Mcduffie

## 2023-02-22 ENCOUNTER — TELEPHONE (OUTPATIENT)
Dept: CARDIOLOGY | Facility: CLINIC | Age: 58
End: 2023-02-22
Payer: MEDICARE

## 2023-02-22 NOTE — TELEPHONE ENCOUNTER
----- Message from Nitin Guaman sent at 2/22/2023  3:38 PM CST -----  Contact: pt at  [phone]  Type: Needs Medical Advice  Who Called:  pt  Best Call Back Number: [phone]  Additional Information: pt is calling the office to have his meds changed back to what they were before he went into [facility]. Please call back and advise.

## 2023-02-23 ENCOUNTER — OFFICE VISIT (OUTPATIENT)
Dept: CARDIOLOGY | Facility: CLINIC | Age: 58
End: 2023-02-23
Payer: MEDICARE

## 2023-02-23 VITALS
DIASTOLIC BLOOD PRESSURE: 70 MMHG | HEART RATE: 86 BPM | HEIGHT: 69 IN | OXYGEN SATURATION: 98 % | SYSTOLIC BLOOD PRESSURE: 122 MMHG | BODY MASS INDEX: 29.47 KG/M2 | WEIGHT: 199 LBS

## 2023-02-23 DIAGNOSIS — R29.818 SUSPECTED SLEEP APNEA: ICD-10-CM

## 2023-02-23 DIAGNOSIS — R41.3 MEMORY DEFICIT: ICD-10-CM

## 2023-02-23 DIAGNOSIS — Z95.1 S/P CABG X 1: ICD-10-CM

## 2023-02-23 DIAGNOSIS — I10 PRIMARY HYPERTENSION: ICD-10-CM

## 2023-02-23 DIAGNOSIS — R41.89 BRAIN FOG: ICD-10-CM

## 2023-02-23 DIAGNOSIS — I25.10 CORONARY ARTERY DISEASE INVOLVING NATIVE CORONARY ARTERY OF NATIVE HEART WITHOUT ANGINA PECTORIS: Primary | ICD-10-CM

## 2023-02-23 PROCEDURE — 3044F PR MOST RECENT HEMOGLOBIN A1C LEVEL <7.0%: ICD-10-PCS | Mod: CPTII,S$GLB,, | Performed by: NURSE PRACTITIONER

## 2023-02-23 PROCEDURE — 3074F PR MOST RECENT SYSTOLIC BLOOD PRESSURE < 130 MM HG: ICD-10-PCS | Mod: CPTII,S$GLB,, | Performed by: NURSE PRACTITIONER

## 2023-02-23 PROCEDURE — 3078F DIAST BP <80 MM HG: CPT | Mod: CPTII,S$GLB,, | Performed by: NURSE PRACTITIONER

## 2023-02-23 PROCEDURE — 3078F PR MOST RECENT DIASTOLIC BLOOD PRESSURE < 80 MM HG: ICD-10-PCS | Mod: CPTII,S$GLB,, | Performed by: NURSE PRACTITIONER

## 2023-02-23 PROCEDURE — 3074F SYST BP LT 130 MM HG: CPT | Mod: CPTII,S$GLB,, | Performed by: NURSE PRACTITIONER

## 2023-02-23 PROCEDURE — 1160F RVW MEDS BY RX/DR IN RCRD: CPT | Mod: CPTII,S$GLB,, | Performed by: NURSE PRACTITIONER

## 2023-02-23 PROCEDURE — 1159F MED LIST DOCD IN RCRD: CPT | Mod: CPTII,S$GLB,, | Performed by: NURSE PRACTITIONER

## 2023-02-23 PROCEDURE — 99999 PR PBB SHADOW E&M-EST. PATIENT-LVL V: CPT | Mod: PBBFAC,,, | Performed by: NURSE PRACTITIONER

## 2023-02-23 PROCEDURE — 3008F BODY MASS INDEX DOCD: CPT | Mod: CPTII,S$GLB,, | Performed by: NURSE PRACTITIONER

## 2023-02-23 PROCEDURE — 3044F HG A1C LEVEL LT 7.0%: CPT | Mod: CPTII,S$GLB,, | Performed by: NURSE PRACTITIONER

## 2023-02-23 PROCEDURE — 99214 OFFICE O/P EST MOD 30 MIN: CPT | Mod: S$GLB,,, | Performed by: NURSE PRACTITIONER

## 2023-02-23 PROCEDURE — 3008F PR BODY MASS INDEX (BMI) DOCUMENTED: ICD-10-PCS | Mod: CPTII,S$GLB,, | Performed by: NURSE PRACTITIONER

## 2023-02-23 PROCEDURE — 1160F PR REVIEW ALL MEDS BY PRESCRIBER/CLIN PHARMACIST DOCUMENTED: ICD-10-PCS | Mod: CPTII,S$GLB,, | Performed by: NURSE PRACTITIONER

## 2023-02-23 PROCEDURE — 99214 PR OFFICE/OUTPT VISIT, EST, LEVL IV, 30-39 MIN: ICD-10-PCS | Mod: S$GLB,,, | Performed by: NURSE PRACTITIONER

## 2023-02-23 PROCEDURE — 1159F PR MEDICATION LIST DOCUMENTED IN MEDICAL RECORD: ICD-10-PCS | Mod: CPTII,S$GLB,, | Performed by: NURSE PRACTITIONER

## 2023-02-23 PROCEDURE — 99999 PR PBB SHADOW E&M-EST. PATIENT-LVL V: ICD-10-PCS | Mod: PBBFAC,,, | Performed by: NURSE PRACTITIONER

## 2023-02-23 RX ORDER — METOPROLOL SUCCINATE 25 MG/1
25 TABLET, EXTENDED RELEASE ORAL DAILY
Qty: 90 TABLET | Refills: 3 | Status: SHIPPED | OUTPATIENT
Start: 2023-02-23 | End: 2024-03-18

## 2023-02-23 RX ORDER — LANOLIN ALCOHOL/MO/W.PET/CERES
200 CREAM (GRAM) TOPICAL NIGHTLY
Qty: 45 TABLET | Refills: 3 | Status: SHIPPED | OUTPATIENT
Start: 2023-02-23 | End: 2023-03-25

## 2023-02-23 RX ORDER — ASPIRIN 81 MG/1
81 TABLET ORAL DAILY
COMMUNITY

## 2023-02-23 NOTE — ASSESSMENT & PLAN NOTE
Patient is convinced that the symptom as well as brain fog is related to medication changes.  Will have him changed back to metoprolol from diltiazem and decrease his magnesium oxide 200 mg to be taken at night.  Will also refer him to sleep Medicine for evaluation of sleep apnea.  He does appear to have significant symptoms including severe snoring, waking himself up at night snoring, daytime sleepiness, and brain fog.

## 2023-02-23 NOTE — ASSESSMENT & PLAN NOTE
Will switch him back to metoprolol succinate 25 mg p.o. daily and stop Cardizem.  Unsure if this is contributing to his symptoms however he is quite concerned about the medications.

## 2023-02-23 NOTE — TELEPHONE ENCOUNTER
----- Message from Tesha Radha sent at 2/23/2023 11:37 AM CST -----  Contact: pt  Pt went to [facility] and was admitted and they changed his medication and now he does not feel himself, feels bad and took him off some med's and added new, he needs to speak to the DR ASAP.        Message left yesterday    Carmen Sánchez MA          4:00 PM  Note  ----- Message from Nitin Guaman sent at 2/22/2023  3:38 PM CST -----  Contact: pt at  [phone]  Type: Needs Medical Advice  Who Called:  pt  Best Call Back Number: [phone]  Additional Information: pt is calling the office to have his meds changed back to what they were before he went into [facility]. Please call back and advise.

## 2023-02-23 NOTE — PATIENT INSTRUCTIONS
Check to see if you have atorvastatin 40 mg at home. You should still be taking it, but it was not with you today. Please let me know if you need a refill.

## 2023-02-23 NOTE — PROGRESS NOTES
Subjective:    Patient ID:  Jose Alberto Hager is a 57 y.o. male   Chief Complaint   Patient presents with    Medication Problem       HPI:  Patient seen today with complaints and concerns about having memory issues and brain fog.  Reports that he believes these symptoms have worsened/maybe have started since having changed medications during her recent hospitalization.  Patient reports he is very forgetful.  Unfortunately he is alone at today's visit.  Review of patient's allergies indicates:  No Known Allergies    Past Medical History:   Diagnosis Date    Back pain     Coronary artery disease     High cholesterol     Hypertension     Insomnia      Past Surgical History:   Procedure Laterality Date    ANGIOGRAM, CORONARY, WITH LEFT HEART CATHETERIZATION N/A 3/4/2021    Procedure: Angiogram, Coronary, with Left Heart Cath;  Surgeon: Aquilino Rodriguez MD;  Location: [facility] CATH/EP LAB;  Service: Cardiology;  Laterality: N/A;    BACK SURGERY      CARDIAC CATHETERIZATION      CORONARY ARTERY BYPASS GRAFT      CREATION OF BYPASS OF CORONARY ARTERY USING GRAFT WITHOUT CARDIOPULMONARY PUMP OXYGENATION N/A 3/5/2021    Procedure: CABG, WITHOUT CARDIOPULMONARY PUMP OXYGENATION;  Surgeon: Toy Castillo MD;  Location: [facility] OR;  Service: Cardiothoracic;  Laterality: N/A;    KNEE SURGERY Left      Social History     Tobacco Use    Smoking status: Never    Smokeless tobacco: Never   Substance Use Topics    Alcohol use: No     Family History   Problem Relation Age of Onset    Cancer Father         colon cancer    Brain cancer Mother         tumor        Review of Systems:   Constitution: Negative for diaphoresis and fever.   HEENT: Negative for nosebleeds.    Cardiovascular: Negative for chest pain       No dyspnea on exertion       No leg swelling        No palpitations  Respiratory: +for shortness of breath and cough  Hematologic/Lymphatic: Negative for bleeding problem. Does not bruise/bleed easily.   Skin: Negative for  color change and rash.   Musculoskeletal: Negative for falls and myalgias.   Gastrointestinal: Negative for hematemesis and hematochezia.   Genitourinary: Negative for hematuria.   Neurological: + issues with memory, brain fog since medications were changed         Objective:        Vitals:    02/23/23 1546   BP: 122/70   Pulse: 86       Lab Results   Component Value Date    WBC 7.72 01/28/2023    HGB 14.0 01/28/2023    HCT 43.5 01/28/2023     01/28/2023    CHOL 127 01/27/2023    TRIG 98 01/27/2023    HDL 34 (L) 01/27/2023    ALT 21 01/27/2023    AST 21 01/27/2023     01/28/2023    K 3.9 01/28/2023     01/28/2023    CREATININE 1.2 01/28/2023    BUN 17 01/28/2023    CO2 28 01/28/2023    TSH 1.700 01/27/2023    INR 1.0 01/27/2023    HGBA1C 6.3 (H) 01/28/2023        ECHOCARDIOGRAM RESULTS  Results for orders placed during the hospital encounter of 01/27/23    Echo Saline Bubble? Yes    Interpretation Summary  · The left ventricle is normal in size with mild concentric hypertrophy and normal systolic function.  · There is no evidence of intracardiac shunting.  · The estimated ejection fraction is 65%.  · Normal left ventricular diastolic function.  · Normal right ventricular size with normal right ventricular systolic function.  · Mild left atrial enlargement.  · Mild mitral regurgitation.  · Normal central venous pressure (3 mmHg).  · The estimated PA systolic pressure is 27 mmHg.  · Mild tricuspid regurgitation.        CURRENT/PREVIOUS VISIT EKG  Results for orders placed or performed during the hospital encounter of 01/27/23   ECG 12 lead    Collection Time: 01/27/23  1:30 PM    Narrative    Test Reason : I63.9,    Vent. Rate : 068 BPM     Atrial Rate : 068 BPM     P-R Int : 158 ms          QRS Dur : 086 ms      QT Int : 410 ms       P-R-T Axes : 042 016 052 degrees     QTc Int : 435 ms    Normal sinus rhythm  Normal ECG  When compared with ECG of 05-MAR-2021 11:14,  No significant change was  found  Confirmed by Fredi Rooney MD ([id number]) on 1/29/2023 1:55:22 PM    Referred By: [email or web address]   SELF           Confirmed By:Fredi Rooney MD     No valid procedures specified.   Results for orders placed during the hospital encounter of 01/27/23    Nuclear Stress Test    Interpretation Summary    The ECG portion of the study is negative for ischemia.    The patient reported no chest pain during the stress test.    There were no arrhythmias during stress.    The nuclear portion of this study will be reported separately. The patient exercised for 6 minutes 30 seconds on a Gee protocol, corresponding to a functional capacity of 7.1 METS, achieving a peak heart rate of 144 bpm, which is 88 % of the age predicted maximum heart rate.      Physical Exam:  CONSTITUTIONAL: No fever, no chills  HEENT: Normocephalic, atraumatic,pupils reactive to light                 NECK:  No JVD no carotid bruit  CVS: S1S2+, RRR  LUNGS: Clear  ABDOMEN: Soft, NT, BS+  EXTREMITIES: No cyanosis, edema  : No lynn catheter  NEURO: AAO X 3  PSY: Normal affect      Medication List with Changes/Refills   New Medications    METOPROLOL SUCCINATE (TOPROL-XL) 25 MG 24 HR TABLET    Take 1 tablet (25 mg total) by mouth once daily.   Current Medications    ASPIRIN (ECOTRIN) 81 MG EC TABLET    Take 81 mg by mouth once daily.    ATORVASTATIN (LIPITOR) 40 MG TABLET    Take 1 tablet by mouth once daily.    CLOPIDOGREL (PLAVIX) 75 MG TABLET    Take 1 tablet (75 mg total) by mouth once daily.    DOCUSATE SODIUM (COLACE) 100 MG CAPSULE    Take 1 capsule (100 mg total) by mouth 2 (two) times daily.    MELOXICAM (MOBIC) 15 MG TABLET    Take 15 mg by mouth as needed.    MORPHINE (MSIR) 15 MG TABLET    Take 15 mg by mouth 3 (three) times daily as needed.    MULTIVIT-MINS/IRON/FOLIC/LYCOP (CENTRUM ULTRA MEN'S ORAL)    Take 1 Dose by mouth once daily.    OMEPRAZOLE (PRILOSEC) 40 MG CAPSULE    Take 40 mg by mouth once daily.   Changed and/or Refilled  Medications    Modified Medication Previous Medication    MAGNESIUM OXIDE (MAG-OX) 400 MG (241.3 MG MAGNESIUM) TABLET magnesium oxide (MAG-OX) 400 mg (241.3 mg magnesium) tablet       Take 0.5 tablets (200 mg total) by mouth every evening.    Take 1 tablet (400 mg total) by mouth once daily.   Discontinued Medications    DILTIAZEM (CARDIZEM CD) 180 MG 24 HR CAPSULE    Take 1 capsule (180 mg total) by mouth once daily.             Assessment:       1. Coronary artery disease involving native coronary artery of native heart without angina pectoris    2. S/P CABG x 1    3. Primary hypertension    4. Memory deficit    5. Brain fog    6. Suspected sleep apnea         Plan:     Problem List Items Addressed This Visit          Unprioritized    Hypertension    Current Assessment & Plan     Will switch him back to metoprolol succinate 25 mg p.o. daily and stop Cardizem.  Unsure if this is contributing to his symptoms however he is quite concerned about the medications.         Relevant Medications    metoprolol succinate (TOPROL-XL) 25 MG 24 hr tablet    CAD (coronary artery disease) - Primary    Current Assessment & Plan     Nuclear stress test was negative for reversible ischemia.  Patient is not having any chest discomfort.  Continue with Plavix and aspirin.         Relevant Medications    aspirin (ECOTRIN) 81 MG EC tablet    metoprolol succinate (TOPROL-XL) 25 MG 24 hr tablet    S/P CABG x 1    Overview     LIMA to the left anterior descending artery of pump Dr. Dye 03/04/2021         Relevant Medications    aspirin (ECOTRIN) 81 MG EC tablet    metoprolol succinate (TOPROL-XL) 25 MG 24 hr tablet    Memory deficit    Current Assessment & Plan     Patient is convinced that the symptom as well as brain fog is related to medication changes.  Will have him changed back to metoprolol from diltiazem and decrease his magnesium oxide 200 mg to be taken at night.  Will also refer him to sleep Medicine for evaluation of sleep  apnea.  He does appear to have significant symptoms including severe snoring, waking himself up at night snoring, daytime sleepiness, and brain fog.         Relevant Orders    Ambulatory referral/consult to Sleep Disorders    Brain fog    Relevant Orders    Ambulatory referral/consult to Sleep Disorders    Suspected sleep apnea    Relevant Orders    Ambulatory referral/consult to Sleep Disorders       Follow up in about 3 months (around 5/23/2023).

## 2023-02-23 NOTE — ASSESSMENT & PLAN NOTE
Nuclear stress test was negative for reversible ischemia.  Patient is not having any chest discomfort.  Continue with Plavix and aspirin.

## 2023-02-27 ENCOUNTER — TELEPHONE (OUTPATIENT)
Dept: PULMONOLOGY | Facility: CLINIC | Age: 58
End: 2023-02-27

## 2023-04-25 ENCOUNTER — OFFICE VISIT (OUTPATIENT)
Dept: PULMONOLOGY | Facility: CLINIC | Age: 58
End: 2023-04-25
Payer: MEDICARE

## 2023-04-25 VITALS
SYSTOLIC BLOOD PRESSURE: 120 MMHG | WEIGHT: 197 LBS | OXYGEN SATURATION: 98 % | HEART RATE: 61 BPM | DIASTOLIC BLOOD PRESSURE: 80 MMHG | BODY MASS INDEX: 29.18 KG/M2 | HEIGHT: 69 IN

## 2023-04-25 DIAGNOSIS — R41.89 BRAIN FOG: ICD-10-CM

## 2023-04-25 DIAGNOSIS — R29.818 SUSPECTED SLEEP APNEA: Primary | ICD-10-CM

## 2023-04-25 DIAGNOSIS — R41.3 MEMORY DEFICIT: ICD-10-CM

## 2023-04-25 DIAGNOSIS — F51.8 OTHER SLEEP DISORDERS NOT DUE TO A SUBSTANCE OR KNOWN PHYSIOLOGICAL CONDITION: ICD-10-CM

## 2023-04-25 PROCEDURE — 3079F DIAST BP 80-89 MM HG: CPT | Mod: CPTII,S$GLB,, | Performed by: INTERNAL MEDICINE

## 2023-04-25 PROCEDURE — 1159F MED LIST DOCD IN RCRD: CPT | Mod: CPTII,S$GLB,, | Performed by: INTERNAL MEDICINE

## 2023-04-25 PROCEDURE — 3079F PR MOST RECENT DIASTOLIC BLOOD PRESSURE 80-89 MM HG: ICD-10-PCS | Mod: CPTII,S$GLB,, | Performed by: INTERNAL MEDICINE

## 2023-04-25 PROCEDURE — 3044F HG A1C LEVEL LT 7.0%: CPT | Mod: CPTII,S$GLB,, | Performed by: INTERNAL MEDICINE

## 2023-04-25 PROCEDURE — 3008F PR BODY MASS INDEX (BMI) DOCUMENTED: ICD-10-PCS | Mod: CPTII,S$GLB,, | Performed by: INTERNAL MEDICINE

## 2023-04-25 PROCEDURE — 3008F BODY MASS INDEX DOCD: CPT | Mod: CPTII,S$GLB,, | Performed by: INTERNAL MEDICINE

## 2023-04-25 PROCEDURE — 99204 OFFICE O/P NEW MOD 45 MIN: CPT | Mod: S$GLB,,, | Performed by: INTERNAL MEDICINE

## 2023-04-25 PROCEDURE — 3074F SYST BP LT 130 MM HG: CPT | Mod: CPTII,S$GLB,, | Performed by: INTERNAL MEDICINE

## 2023-04-25 PROCEDURE — 3044F PR MOST RECENT HEMOGLOBIN A1C LEVEL <7.0%: ICD-10-PCS | Mod: CPTII,S$GLB,, | Performed by: INTERNAL MEDICINE

## 2023-04-25 PROCEDURE — 1159F PR MEDICATION LIST DOCUMENTED IN MEDICAL RECORD: ICD-10-PCS | Mod: CPTII,S$GLB,, | Performed by: INTERNAL MEDICINE

## 2023-04-25 PROCEDURE — 99204 PR OFFICE/OUTPT VISIT, NEW, LEVL IV, 45-59 MIN: ICD-10-PCS | Mod: S$GLB,,, | Performed by: INTERNAL MEDICINE

## 2023-04-25 PROCEDURE — 3074F PR MOST RECENT SYSTOLIC BLOOD PRESSURE < 130 MM HG: ICD-10-PCS | Mod: CPTII,S$GLB,, | Performed by: INTERNAL MEDICINE

## 2023-04-25 RX ORDER — FLUTICASONE PROPIONATE 50 MCG
1 SPRAY, SUSPENSION (ML) NASAL
COMMUNITY
Start: 2022-12-08

## 2023-04-25 RX ORDER — CLOTRIMAZOLE AND BETAMETHASONE DIPROPIONATE 10; .64 MG/G; MG/G
CREAM TOPICAL
COMMUNITY
Start: 2022-05-05 | End: 2023-05-05

## 2023-04-25 RX ORDER — GABAPENTIN 300 MG/1
CAPSULE ORAL
COMMUNITY
Start: 2022-10-11 | End: 2023-06-01

## 2023-04-25 RX ORDER — SILDENAFIL 100 MG/1
100 TABLET, FILM COATED ORAL
COMMUNITY
Start: 2022-05-05 | End: 2023-06-01

## 2023-04-25 RX ORDER — BACLOFEN 10 MG/1
TABLET ORAL
COMMUNITY
Start: 2023-03-24 | End: 2023-06-01

## 2023-04-25 NOTE — PROGRESS NOTES
SUBJECTIVE:    Patient ID: Jose Alberto Hager is a 57 y.o. male.    Chief Complaint: Apnea    HPI The patient is here after being referred by cards for memory problems and sleep issues.  He goes to bed at 10-10:30. Sometimes takes 30-40 miinutes to fall asleep.  He then wakes at night and has trouble falling back asleep.  He snores badly.  He awakens himself with his restorative snorts.  No morning headaches, he has a decreased libido.  No depression.  Past Medical History:   Diagnosis Date    Back pain     Coronary artery disease     High cholesterol     Hypertension     Insomnia      Past Surgical History:   Procedure Laterality Date    ANGIOGRAM, CORONARY, WITH LEFT HEART CATHETERIZATION N/A 3/4/2021    Procedure: Angiogram, Coronary, with Left Heart Cath;  Surgeon: Aquilino Rodriguez MD;  Location: [facility] CATH/EP LAB;  Service: Cardiology;  Laterality: N/A;    BACK SURGERY      CARDIAC CATHETERIZATION      CORONARY ARTERY BYPASS GRAFT      CREATION OF BYPASS OF CORONARY ARTERY USING GRAFT WITHOUT CARDIOPULMONARY PUMP OXYGENATION N/A 3/5/2021    Procedure: CABG, WITHOUT CARDIOPULMONARY PUMP OXYGENATION;  Surgeon: Toy Castillo MD;  Location: [facility] OR;  Service: Cardiothoracic;  Laterality: N/A;    KNEE SURGERY Left      Family History   Problem Relation Age of Onset    Cancer Father         colon cancer    Brain cancer Mother         tumor        Social History:   Marital Status:   Occupation: Data Unavailable  Alcohol History:  reports no history of alcohol use.  Tobacco History:  reports that he has never smoked. He has never used smokeless tobacco.  Drug History:  has no history on file for drug use.    Review of patient's allergies indicates:  No Known Allergies    Current Outpatient Medications   Medication Sig Dispense Refill    aspirin (ECOTRIN) 81 MG EC tablet Take 81 mg by mouth once daily.      atorvastatin (LIPITOR) 40 MG tablet Take 1 tablet by mouth once daily.      baclofen (LIORESAL)  "10 MG tablet Take by mouth.      clotrimazole-betamethasone 1-0.05% (LOTRISONE) cream Apply BID sparingly      diltiaZEM (CARDIZEM CD) 180 MG 24 hr capsule TAKE ONE CAPSULE BY MOUTH ONCE DAILY 30 capsule 0    docusate sodium (COLACE) 100 MG capsule Take 1 capsule (100 mg total) by mouth 2 (two) times daily. 60 capsule 0    fluticasone propionate (FLONASE) 50 mcg/actuation nasal spray 1 spray by Each Nostril route.      gabapentin (NEURONTIN) 300 MG capsule TAKE ONE CAPSULE TWICE DAILY AS TOLERATED FOR neuropathic pain      meloxicam (MOBIC) 15 MG tablet Take 15 mg by mouth as needed.  2    metoprolol succinate (TOPROL-XL) 25 MG 24 hr tablet Take 1 tablet (25 mg total) by mouth once daily. 90 tablet 3    morphine (MSIR) 15 MG tablet Take 15 mg by mouth 3 (three) times daily as needed.      multivit-mins/iron/folic/lycop (CENTRUM ULTRA MEN'S ORAL) Take 1 Dose by mouth once daily.      omeprazole (PRILOSEC) 40 MG capsule Take 40 mg by mouth once daily.  6    sildenafiL (VIAGRA) 100 MG tablet Take 100 mg by mouth as needed.      clopidogreL (PLAVIX) 75 mg tablet Take 1 tablet (75 mg total) by mouth once daily. 30 tablet 0     No current facility-administered medications for this visit.       Alpha-1 Antitrypsin:  Last PFT:   Last CT:    Review of Systems  General: Feeling tired.    Eyes: Vision is getting worse.   ENT:  has allergies, snores badly  Heart:: occsaional palpatations  Lungs: No cough, sputum, or wheezing.  GI: reflux is bad, some constipation.  : No dysuria, hesitancy, or nocturia.  Musculoskeletal: elbows, wrists and fingers hurt  Skin: No lesions or rashes.  Neuro: Neuropathy to his legs, numbness to R leg  Lymph: No edema or adenopathy.  Psych: anxiety since his heart attack  Endo: No weight change.    OBJECTIVE:      /80 (BP Location: Left arm, Patient Position: Sitting, BP Method: Medium (Manual))   Pulse 61   Ht 5' 9" (1.753 m)   Wt 89.4 kg (197 lb)   SpO2 98%   BMI 29.09 kg/m² "     Physical Exam  GENERAL: Older patient in no distress.  HEENT: Pupils equal and reactive. Extraocular movements intact. Nose intact.      Pharynx moist.  Mallampati 4  NECK: Supple.  17 in  HEART: Regular rate and rhythm. No murmur or gallop auscultated.  LUNGS: Clear to auscultation and percussion. Lung excursion symmetrical. No change in fremitus. No adventitial noises.  ABDOMEN: Bowel sounds present. Non-tender, no masses palpated.  EXTREMITIES: Normal muscle tone and joint movement, no cyanosis or clubbing.   LYMPHATICS: No adenopathy palpated, no edema.  SKIN: Dry, intact, no lesions.   NEURO: Cranial nerves II-XII intact. Motor strength 5/5 bilaterally, upper and lower extremities.  PSYCH: Appropriate affect.    Walton Sleepiness Scale 9  Assessment:       1. Suspected sleep apnea    2. Memory deficit    3. Brain fog    4. Other sleep disorders not due to a substance or known physiological condition        Patient undoubtedly has sleep apnea.  Plan:       Suspected sleep apnea  -     Ambulatory referral/consult to Sleep Disorders  -     Home Sleep Study; Future    Memory deficit  -     Ambulatory referral/consult to Sleep Disorders    Brain fog  -     Ambulatory referral/consult to Sleep Disorders    Other sleep disorders not due to a substance or known physiological condition  -     Home Sleep Study; Future         Follow up in about 3 months (around 7/25/2023).

## 2023-04-27 ENCOUNTER — PROCEDURE VISIT (OUTPATIENT)
Dept: SLEEP MEDICINE | Facility: HOSPITAL | Age: 58
End: 2023-04-27
Attending: INTERNAL MEDICINE
Payer: MEDICARE

## 2023-04-27 DIAGNOSIS — F51.8 OTHER SLEEP DISORDERS NOT DUE TO A SUBSTANCE OR KNOWN PHYSIOLOGICAL CONDITION: ICD-10-CM

## 2023-04-27 DIAGNOSIS — R29.818 SUSPECTED SLEEP APNEA: ICD-10-CM

## 2023-04-27 PROCEDURE — 95806 SLEEP STUDY UNATT&RESP EFFT: CPT

## 2023-05-03 ENCOUNTER — TELEPHONE (OUTPATIENT)
Dept: PULMONOLOGY | Facility: HOSPITAL | Age: 58
End: 2023-05-03

## 2023-05-03 DIAGNOSIS — G47.33 OSA (OBSTRUCTIVE SLEEP APNEA): Primary | ICD-10-CM

## 2023-05-03 NOTE — TELEPHONE ENCOUNTER
Patient's home sleep study shows an 8 OBDULIO of 5.9.  His sats dropped to 77%.  He is very symptomatic.  Order an auto Pap for him.  Tried to reach him, no answer, not able to leave a message    5/4 reached patient and told him about an auto Pap and how to  care for it and compliance etc..

## 2023-05-11 ENCOUNTER — TELEPHONE (OUTPATIENT)
Dept: PULMONOLOGY | Facility: CLINIC | Age: 58
End: 2023-05-11

## 2023-05-11 NOTE — TELEPHONE ENCOUNTER
Spoke with patient.  Explained that 5 cm is low as it will go.  Suggested turn off turn on when he awakens with a blowing too hard.  He has used it for 2 hours the 1st night and an hour and a half last night.

## 2023-05-11 NOTE — TELEPHONE ENCOUNTER
----- Message from Osman Qiu sent at 5/11/2023  2:21 PM CDT -----  Regarding: Patient has Questions  Patient called and would like for a nurse to give him a call. Pt stated that he has questions regarding his machine and needs to know if he needs to turn it down because it blows so much area in to his mouth. Please give him a call back at [phone]

## 2023-05-27 RX ORDER — DILTIAZEM HYDROCHLORIDE 180 MG/1
CAPSULE, COATED, EXTENDED RELEASE ORAL
Qty: 30 CAPSULE | Refills: 11 | Status: SHIPPED | OUTPATIENT
Start: 2023-05-27 | End: 2023-06-01

## 2023-06-01 ENCOUNTER — OFFICE VISIT (OUTPATIENT)
Dept: CARDIOLOGY | Facility: CLINIC | Age: 58
End: 2023-06-01
Payer: MEDICARE

## 2023-06-01 VITALS
SYSTOLIC BLOOD PRESSURE: 122 MMHG | DIASTOLIC BLOOD PRESSURE: 80 MMHG | OXYGEN SATURATION: 98 % | HEIGHT: 69 IN | HEART RATE: 70 BPM | BODY MASS INDEX: 29.34 KG/M2 | WEIGHT: 198.06 LBS

## 2023-06-01 DIAGNOSIS — I10 ESSENTIAL HYPERTENSION: ICD-10-CM

## 2023-06-01 DIAGNOSIS — E78.00 HYPERCHOLESTEROLEMIA: ICD-10-CM

## 2023-06-01 DIAGNOSIS — Z95.1 S/P CABG X 1: Primary | ICD-10-CM

## 2023-06-01 DIAGNOSIS — I25.10 CORONARY ARTERY DISEASE INVOLVING NATIVE CORONARY ARTERY OF NATIVE HEART WITHOUT ANGINA PECTORIS: ICD-10-CM

## 2023-06-01 PROCEDURE — 3074F PR MOST RECENT SYSTOLIC BLOOD PRESSURE < 130 MM HG: ICD-10-PCS | Mod: CPTII,S$GLB,, | Performed by: INTERNAL MEDICINE

## 2023-06-01 PROCEDURE — 3079F PR MOST RECENT DIASTOLIC BLOOD PRESSURE 80-89 MM HG: ICD-10-PCS | Mod: CPTII,S$GLB,, | Performed by: INTERNAL MEDICINE

## 2023-06-01 PROCEDURE — 4010F PR ACE/ARB THEARPY RXD/TAKEN: ICD-10-PCS | Mod: CPTII,S$GLB,, | Performed by: INTERNAL MEDICINE

## 2023-06-01 PROCEDURE — 1160F RVW MEDS BY RX/DR IN RCRD: CPT | Mod: CPTII,S$GLB,, | Performed by: INTERNAL MEDICINE

## 2023-06-01 PROCEDURE — 3074F SYST BP LT 130 MM HG: CPT | Mod: CPTII,S$GLB,, | Performed by: INTERNAL MEDICINE

## 2023-06-01 PROCEDURE — 99213 OFFICE O/P EST LOW 20 MIN: CPT | Mod: S$GLB,,, | Performed by: INTERNAL MEDICINE

## 2023-06-01 PROCEDURE — 1160F PR REVIEW ALL MEDS BY PRESCRIBER/CLIN PHARMACIST DOCUMENTED: ICD-10-PCS | Mod: CPTII,S$GLB,, | Performed by: INTERNAL MEDICINE

## 2023-06-01 PROCEDURE — 3008F BODY MASS INDEX DOCD: CPT | Mod: CPTII,S$GLB,, | Performed by: INTERNAL MEDICINE

## 2023-06-01 PROCEDURE — 99999 PR PBB SHADOW E&M-EST. PATIENT-LVL IV: CPT | Mod: PBBFAC,,, | Performed by: INTERNAL MEDICINE

## 2023-06-01 PROCEDURE — 1159F PR MEDICATION LIST DOCUMENTED IN MEDICAL RECORD: ICD-10-PCS | Mod: CPTII,S$GLB,, | Performed by: INTERNAL MEDICINE

## 2023-06-01 PROCEDURE — 3044F HG A1C LEVEL LT 7.0%: CPT | Mod: CPTII,S$GLB,, | Performed by: INTERNAL MEDICINE

## 2023-06-01 PROCEDURE — 99213 PR OFFICE/OUTPT VISIT, EST, LEVL III, 20-29 MIN: ICD-10-PCS | Mod: S$GLB,,, | Performed by: INTERNAL MEDICINE

## 2023-06-01 PROCEDURE — 4010F ACE/ARB THERAPY RXD/TAKEN: CPT | Mod: CPTII,S$GLB,, | Performed by: INTERNAL MEDICINE

## 2023-06-01 PROCEDURE — 99999 PR PBB SHADOW E&M-EST. PATIENT-LVL IV: ICD-10-PCS | Mod: PBBFAC,,, | Performed by: INTERNAL MEDICINE

## 2023-06-01 PROCEDURE — 3008F PR BODY MASS INDEX (BMI) DOCUMENTED: ICD-10-PCS | Mod: CPTII,S$GLB,, | Performed by: INTERNAL MEDICINE

## 2023-06-01 PROCEDURE — 1159F MED LIST DOCD IN RCRD: CPT | Mod: CPTII,S$GLB,, | Performed by: INTERNAL MEDICINE

## 2023-06-01 PROCEDURE — 3044F PR MOST RECENT HEMOGLOBIN A1C LEVEL <7.0%: ICD-10-PCS | Mod: CPTII,S$GLB,, | Performed by: INTERNAL MEDICINE

## 2023-06-01 PROCEDURE — 3079F DIAST BP 80-89 MM HG: CPT | Mod: CPTII,S$GLB,, | Performed by: INTERNAL MEDICINE

## 2023-06-01 RX ORDER — RAMIPRIL 2.5 MG/1
2.5 CAPSULE ORAL NIGHTLY
Qty: 90 CAPSULE | Refills: 3 | Status: SHIPPED | OUTPATIENT
Start: 2023-06-01 | End: 2024-05-31

## 2023-06-01 NOTE — PROGRESS NOTES
Patient ID:  Jose Alberto Hager is a 57 y.o. male who presents for follow-up of Coronary Artery Disease, Hypertension, and Hyperlipidemia      He has been doing well he continues to have incisional chest pain.  He denies any anginal pain.  He is going to be seen the gastroenterologist in the near future.  Reviewing his medication it was noted that he is not on ACE inhibitor.  In the past he was not started because of low blood pressure.  Will go ahead and attempt ramipril 2.5 mg q.h.s..      Past Medical History:   Diagnosis Date    Back pain     Coronary artery disease     High cholesterol     Hypertension     Insomnia         Past Surgical History:   Procedure Laterality Date    ANGIOGRAM, CORONARY, WITH LEFT HEART CATHETERIZATION N/A 3/4/2021    Procedure: Angiogram, Coronary, with Left Heart Cath;  Surgeon: Aquilino Rodriguez MD;  Location: [facility] CATH/EP LAB;  Service: Cardiology;  Laterality: N/A;    BACK SURGERY      CARDIAC CATHETERIZATION      CORONARY ARTERY BYPASS GRAFT      CREATION OF BYPASS OF CORONARY ARTERY USING GRAFT WITHOUT CARDIOPULMONARY PUMP OXYGENATION N/A 3/5/2021    Procedure: CABG, WITHOUT CARDIOPULMONARY PUMP OXYGENATION;  Surgeon: Toy Castillo MD;  Location: [facility] OR;  Service: Cardiothoracic;  Laterality: N/A;    KNEE SURGERY Left           Current Outpatient Medications   Medication Instructions    aspirin (ECOTRIN) 81 mg, Oral, Daily    atorvastatin (LIPITOR) 40 MG tablet 1 tablet, Oral, Daily    docusate sodium (COLACE) 100 mg, Oral, 2 times daily    fluticasone propionate (FLONASE) 50 mcg/actuation nasal spray 1 spray, Each Nostril    meloxicam (MOBIC) 15 mg, Oral, As needed (PRN)    metoprolol succinate (TOPROL-XL) 25 mg, Oral, Daily    morphine (MSIR) 15 mg, Oral, 3 times daily PRN    multivit-mins/iron/folic/lycop (CENTRUM ULTRA MEN'S ORAL) 1 Dose, Oral, Daily    omeprazole (PRILOSEC) 40 mg, Oral, Daily    ramipriL (ALTACE) 2.5 mg, Oral, Nightly    sildenafiL (VIAGRA)  "100 mg, Oral, As needed (PRN)        Review of patient's allergies indicates:  No Known Allergies     Review of Systems   Cardiovascular:  Negative for chest pain, dyspnea on exertion and palpitations.   Respiratory:  Negative for cough and shortness of breath.       Objective:     Vitals:    06/01/23 0929   BP: 122/80   BP Location: Left arm   Patient Position: Sitting   BP Method: Medium (Manual)   Pulse: 70   SpO2: 98%   Weight: 89.8 kg (198 lb 1.3 oz)   Height: 5' 9" (1.753 m)       Physical Exam  Vitals and nursing note reviewed.   Constitutional:       Appearance: He is well-developed.   HENT:      Head: Normocephalic and atraumatic.   Eyes:      Conjunctiva/sclera: Conjunctivae normal.   Cardiovascular:      Rate and Rhythm: Normal rate and regular rhythm.      Heart sounds: Normal heart sounds.   Pulmonary:      Effort: Pulmonary effort is normal.      Breath sounds: Normal breath sounds.   Abdominal:      General: Bowel sounds are normal.      Palpations: Abdomen is soft.   Musculoskeletal:         General: Normal range of motion.   Skin:     General: Skin is warm and dry.   Neurological:      Mental Status: He is alert and oriented to person, place, and time.   Psychiatric:         Behavior: Behavior normal.         Thought Content: Thought content normal.         Judgment: Judgment normal.     CMP  Sodium   Date Value Ref Range Status   01/28/2023 138 136 - 145 mmol/L Final     Potassium   Date Value Ref Range Status   01/28/2023 3.9 3.5 - 5.1 mmol/L Final     Chloride   Date Value Ref Range Status   01/28/2023 103 95 - 110 mmol/L Final     CO2   Date Value Ref Range Status   01/28/2023 28 23 - 29 mmol/L Final     Glucose   Date Value Ref Range Status   01/28/2023 134 (H) 70 - 110 mg/dL Final     BUN   Date Value Ref Range Status   01/28/2023 17 6 - 20 mg/dL Final     Creatinine   Date Value Ref Range Status   01/28/2023 1.2 0.5 - 1.4 mg/dL Final     Calcium   Date Value Ref Range Status   01/28/2023 9.0 " 8.7 - 10.5 mg/dL Final     Total Protein   Date Value Ref Range Status   01/27/2023 7.7 6.0 - 8.4 g/dL Final     Albumin   Date Value Ref Range Status   01/27/2023 4.6 3.5 - 5.2 g/dL Final     Total Bilirubin   Date Value Ref Range Status   01/27/2023 0.9 0.1 - 1.0 mg/dL Final     Comment:     For infants and newborns, interpretation of results should be based  on gestational age, weight and in agreement with clinical  observations.    Premature Infant recommended reference ranges:  Up to 24 hours.............<8.0 mg/dL  Up to 48 hours............<12.0 mg/dL  3-5 days..................<15.0 mg/dL  6-29 days.................<15.0 mg/dL       Alkaline Phosphatase   Date Value Ref Range Status   01/27/2023 79 55 - 135 U/L Final     AST   Date Value Ref Range Status   01/27/2023 21 10 - 40 U/L Final     ALT   Date Value Ref Range Status   01/27/2023 21 10 - 44 U/L Final     Anion Gap   Date Value Ref Range Status   01/28/2023 7 (L) 8 - 16 mmol/L Final     eGFR if    Date Value Ref Range Status   03/08/2021 >60.0 >60 mL/min/1.73 m^2 Final     eGFR if non    Date Value Ref Range Status   03/08/2021 >60.0 >60 mL/min/1.73 m^2 Final     Comment:     Calculation used to obtain the estimated glomerular filtration  rate (eGFR) is the CKD-EPI equation.         BMP  Lab Results   Component Value Date     01/28/2023    K 3.9 01/28/2023     01/28/2023    CO2 28 01/28/2023    BUN 17 01/28/2023    CREATININE 1.2 01/28/2023    CALCIUM 9.0 01/28/2023    ANIONGAP 7 (L) 01/28/2023    ESTGFRAFRICA >60.0 03/08/2021    EGFRNONAA >60.0 03/08/2021      BNP  @LABRCNTIP(BNP,BNPTRIAGEBLO)@   Lab Results   Component Value Date    CHOL 127 01/27/2023    CHOL 191 03/04/2021     Lab Results   Component Value Date    HDL 34 (L) 01/27/2023    HDL 32 (L) 03/04/2021     Lab Results   Component Value Date    LDLCALC 73.4 01/27/2023    LDLCALC 140.4 03/04/2021     Lab Results   Component Value Date    TRIG 98  01/27/2023    TRIG 93 03/04/2021     Lab Results   Component Value Date    CHOLHDL 26.8 01/27/2023    CHOLHDL 16.8 (L) 03/04/2021      Lab Results   Component Value Date    TSH 1.700 01/27/2023     Lab Results   Component Value Date    HGBA1C 6.3 (H) 01/28/2023     Lab Results   Component Value Date    WBC 7.72 01/28/2023    HGB 14.0 01/28/2023    HCT 43.5 01/28/2023    MCV 86 01/28/2023     01/28/2023         Results for orders placed during the hospital encounter of 01/27/23    Echo Saline Bubble? Yes    Interpretation Summary  · The left ventricle is normal in size with mild concentric hypertrophy and normal systolic function.  · There is no evidence of intracardiac shunting.  · The estimated ejection fraction is 65%.  · Normal left ventricular diastolic function.  · Normal right ventricular size with normal right ventricular systolic function.  · Mild left atrial enlargement.  · Mild mitral regurgitation.  · Normal central venous pressure (3 mmHg).  · The estimated PA systolic pressure is 27 mmHg.  · Mild tricuspid regurgitation.     Results for orders placed during the hospital encounter of 03/03/21    Cardiac catheterization    Conclusion  · The Ost LAD lesion was 95% stenosed.  · The Dist LAD lesion was 50% stenosed.  · The estimated blood loss was <50 mL.  · There was single vessel coronary artery disease.    The procedure log was documented by Documenter: RT Walker and verified by Aquilino Rodriguez MD.    Date: 3/6/2021  Time: 2:36 PM         Assessment:       CAD (coronary artery disease)  No further chest pain.    S/P CABG x 1  Status post LIMA to the LAD off pump    Hypercholesterolemia  Controlled on 40 mg of atorvastatin       Plan:       At ramipril 2.5 mg p.o. q.h.s..  He will be seen in the office in 6 months with a lipid CMP and a TSH.

## 2023-06-20 ENCOUNTER — TELEPHONE (OUTPATIENT)
Dept: PULMONOLOGY | Facility: CLINIC | Age: 58
End: 2023-06-20

## 2023-06-20 NOTE — TELEPHONE ENCOUNTER
----- Message from Osman Qiu sent at 6/20/2023  4:46 PM CDT -----  Regarding: Rx change  Patient called and is requesting a new Rx. Patient stated that his machine is blowing to much air that he can't sleep. He can be reached at [phone]

## 2023-06-21 ENCOUNTER — TELEPHONE (OUTPATIENT)
Dept: PULMONOLOGY | Facility: HOSPITAL | Age: 58
End: 2023-06-21

## 2023-06-21 DIAGNOSIS — G47.33 OSA (OBSTRUCTIVE SLEEP APNEA): Primary | ICD-10-CM

## 2023-07-25 ENCOUNTER — OFFICE VISIT (OUTPATIENT)
Dept: PULMONOLOGY | Facility: CLINIC | Age: 58
End: 2023-07-25
Payer: MEDICARE

## 2023-07-25 VITALS
OXYGEN SATURATION: 98 % | SYSTOLIC BLOOD PRESSURE: 120 MMHG | HEIGHT: 69 IN | WEIGHT: 195 LBS | DIASTOLIC BLOOD PRESSURE: 80 MMHG | HEART RATE: 65 BPM | BODY MASS INDEX: 28.88 KG/M2

## 2023-07-25 DIAGNOSIS — G47.33 OSA (OBSTRUCTIVE SLEEP APNEA): Primary | ICD-10-CM

## 2023-07-25 PROCEDURE — 1159F MED LIST DOCD IN RCRD: CPT | Mod: CPTII,S$GLB,, | Performed by: INTERNAL MEDICINE

## 2023-07-25 PROCEDURE — 4010F ACE/ARB THERAPY RXD/TAKEN: CPT | Mod: CPTII,S$GLB,, | Performed by: INTERNAL MEDICINE

## 2023-07-25 PROCEDURE — 3079F PR MOST RECENT DIASTOLIC BLOOD PRESSURE 80-89 MM HG: ICD-10-PCS | Mod: CPTII,S$GLB,, | Performed by: INTERNAL MEDICINE

## 2023-07-25 PROCEDURE — 4010F PR ACE/ARB THEARPY RXD/TAKEN: ICD-10-PCS | Mod: CPTII,S$GLB,, | Performed by: INTERNAL MEDICINE

## 2023-07-25 PROCEDURE — 3008F BODY MASS INDEX DOCD: CPT | Mod: CPTII,S$GLB,, | Performed by: INTERNAL MEDICINE

## 2023-07-25 PROCEDURE — 3008F PR BODY MASS INDEX (BMI) DOCUMENTED: ICD-10-PCS | Mod: CPTII,S$GLB,, | Performed by: INTERNAL MEDICINE

## 2023-07-25 PROCEDURE — 99213 PR OFFICE/OUTPT VISIT, EST, LEVL III, 20-29 MIN: ICD-10-PCS | Mod: S$GLB,,, | Performed by: INTERNAL MEDICINE

## 2023-07-25 PROCEDURE — 3044F HG A1C LEVEL LT 7.0%: CPT | Mod: CPTII,S$GLB,, | Performed by: INTERNAL MEDICINE

## 2023-07-25 PROCEDURE — 99213 OFFICE O/P EST LOW 20 MIN: CPT | Mod: S$GLB,,, | Performed by: INTERNAL MEDICINE

## 2023-07-25 PROCEDURE — 3044F PR MOST RECENT HEMOGLOBIN A1C LEVEL <7.0%: ICD-10-PCS | Mod: CPTII,S$GLB,, | Performed by: INTERNAL MEDICINE

## 2023-07-25 PROCEDURE — 3074F SYST BP LT 130 MM HG: CPT | Mod: CPTII,S$GLB,, | Performed by: INTERNAL MEDICINE

## 2023-07-25 PROCEDURE — 3079F DIAST BP 80-89 MM HG: CPT | Mod: CPTII,S$GLB,, | Performed by: INTERNAL MEDICINE

## 2023-07-25 PROCEDURE — 1159F PR MEDICATION LIST DOCUMENTED IN MEDICAL RECORD: ICD-10-PCS | Mod: CPTII,S$GLB,, | Performed by: INTERNAL MEDICINE

## 2023-07-25 PROCEDURE — 3074F PR MOST RECENT SYSTOLIC BLOOD PRESSURE < 130 MM HG: ICD-10-PCS | Mod: CPTII,S$GLB,, | Performed by: INTERNAL MEDICINE

## 2023-07-25 RX ORDER — BACLOFEN 10 MG/1
10 TABLET ORAL 2 TIMES DAILY PRN
COMMUNITY
Start: 2023-06-24

## 2023-07-25 RX ORDER — GABAPENTIN 300 MG/1
300 CAPSULE ORAL 2 TIMES DAILY PRN
COMMUNITY
Start: 2023-06-24

## 2023-07-25 RX ORDER — DILTIAZEM HYDROCHLORIDE 180 MG/1
180 CAPSULE, EXTENDED RELEASE ORAL
COMMUNITY
Start: 2023-04-24

## 2023-07-25 NOTE — PROGRESS NOTES
SUBJECTIVE:    Patient ID: Jose Alberto Hager is a 57 y.o. male.    Chief Complaint: Apnea    HPI The patient has had a lot of stress in his life and has not been able to be home to use his CPAP.  He wants to use it.  He wants to feel better.  Past Medical History:   Diagnosis Date    Back pain     Coronary artery disease     High cholesterol     Hypertension     Insomnia      Past Surgical History:   Procedure Laterality Date    ANGIOGRAM, CORONARY, WITH LEFT HEART CATHETERIZATION N/A 3/4/2021    Procedure: Angiogram, Coronary, with Left Heart Cath;  Surgeon: Aquilino Rodriguez MD;  Location: [facility] CATH/EP LAB;  Service: Cardiology;  Laterality: N/A;    BACK SURGERY      CARDIAC CATHETERIZATION      CORONARY ARTERY BYPASS GRAFT      CREATION OF BYPASS OF CORONARY ARTERY USING GRAFT WITHOUT CARDIOPULMONARY PUMP OXYGENATION N/A 3/5/2021    Procedure: CABG, WITHOUT CARDIOPULMONARY PUMP OXYGENATION;  Surgeon: Toy Castillo MD;  Location: [facility] OR;  Service: Cardiothoracic;  Laterality: N/A;    KNEE SURGERY Left      Family History   Problem Relation Age of Onset    Cancer Father         colon cancer    Brain cancer Mother         tumor        Social History:   Marital Status:   Occupation: Data Unavailable  Alcohol History:  reports no history of alcohol use.  Tobacco History:  reports that he has never smoked. He has never used smokeless tobacco.  Drug History:  has no history on file for drug use.    Review of patient's allergies indicates:  No Known Allergies    Current Outpatient Medications   Medication Sig Dispense Refill    aspirin (ECOTRIN) 81 MG EC tablet Take 81 mg by mouth once daily.      atorvastatin (LIPITOR) 40 MG tablet Take 1 tablet by mouth once daily.      baclofen (LIORESAL) 10 MG tablet Take 10 mg by mouth 2 (two) times daily as needed.      diltiaZEM (TIAZAC) 180 MG Cs24 Take 180 mg by mouth.      fluticasone propionate (FLONASE) 50 mcg/actuation nasal spray 1 spray by Each Nostril  "route.      gabapentin (NEURONTIN) 300 MG capsule Take 300 mg by mouth 2 (two) times daily as needed.      meloxicam (MOBIC) 15 MG tablet Take 15 mg by mouth as needed.  2    metoprolol succinate (TOPROL-XL) 25 MG 24 hr tablet Take 1 tablet (25 mg total) by mouth once daily. 90 tablet 3    morphine (MSIR) 15 MG tablet Take 15 mg by mouth 3 (three) times daily as needed.      multivit-mins/iron/folic/lycop (CENTRUM ULTRA MEN'S ORAL) Take 1 Dose by mouth once daily.      omeprazole (PRILOSEC) 40 MG capsule Take 40 mg by mouth once daily.  6    ramipriL (ALTACE) 2.5 MG capsule Take 1 capsule (2.5 mg total) by mouth every evening. 90 capsule 3    docusate sodium (COLACE) 100 MG capsule Take 1 capsule (100 mg total) by mouth 2 (two) times daily. 60 capsule 0    sildenafiL (VIAGRA) 100 MG tablet Take 100 mg by mouth as needed.       No current facility-administered medications for this visit.       Alpha-1 Antitrypsin:  Last PFT:   Last CT:    Review of Systems  General: Feeling tired.    Eyes: Vision is getting worse.   ENT:  has allergies, snores badly  Heart:: occsaional palpatations  Lungs: No cough, sputum, or wheezing.  GI: reflux is bad, some constipation.  : No dysuria, hesitancy, or nocturia.  Musculoskeletal: elbows, wrists and fingers hurt  Skin: No lesions or rashes.  Neuro: Neuropathy to his legs, numbness to R leg  Lymph: No edema or adenopathy.  Psych: feeling very stressed  Endo: No weight change.    OBJECTIVE:      /80 (BP Location: Left arm, Patient Position: Sitting, BP Method: Medium (Manual))   Pulse 65   Ht 5' 9" (1.753 m)   Wt 88.5 kg (195 lb)   SpO2 98%   BMI 28.80 kg/m²     Physical Exam  GENERAL: Older patient in no distress.  HEENT: Pupils equal and reactive. Extraocular movements intact. Nose intact.      Pharynx moist.  Mallampati 4  NECK: Supple.  17 in  HEART: Regular rate and rhythm. No murmur or gallop auscultated.  LUNGS: Clear to auscultation and percussion. Lung excursion " symmetrical. No change in fremitus. No adventitial noises.  ABDOMEN: Bowel sounds present. Non-tender, no masses palpated.  EXTREMITIES: Normal muscle tone and joint movement, no cyanosis or clubbing.   LYMPHATICS: No adenopathy palpated, no edema.  SKIN: Dry, intact, no lesions.   NEURO: Cranial nerves II-XII intact. Motor strength 5/5 bilaterally, upper and lower extremities.  PSYCH: Appropriate affect.    Douglas Sleepiness Scale 9  Assessment:       1. LISSETH (obstructive sleep apnea)            Plan:       LISSETH (obstructive sleep apnea)           Follow up in about 2 months (around 9/25/2023).    The patient has been under a great deal of stress with illness in the family.  He has not been home to sleep on it.  He did find it was more tolerable with the max IPAP lowered to 12. He is very stressed and tired and will try to use it as the circumstances allow.

## 2024-02-01 ENCOUNTER — OFFICE VISIT (OUTPATIENT)
Dept: CARDIOLOGY | Facility: CLINIC | Age: 59
End: 2024-02-01
Payer: MEDICARE

## 2024-02-01 VITALS
HEIGHT: 69 IN | DIASTOLIC BLOOD PRESSURE: 68 MMHG | BODY MASS INDEX: 30.07 KG/M2 | WEIGHT: 203 LBS | SYSTOLIC BLOOD PRESSURE: 120 MMHG

## 2024-02-01 DIAGNOSIS — R00.2 PALPITATIONS: ICD-10-CM

## 2024-02-01 DIAGNOSIS — R07.9 CHEST PAIN, UNSPECIFIED TYPE: ICD-10-CM

## 2024-02-01 DIAGNOSIS — I25.10 CORONARY ARTERY DISEASE INVOLVING NATIVE CORONARY ARTERY OF NATIVE HEART WITHOUT ANGINA PECTORIS: ICD-10-CM

## 2024-02-01 DIAGNOSIS — Z95.1 S/P CABG X 1: Primary | ICD-10-CM

## 2024-02-01 DIAGNOSIS — R07.89 ATYPICAL CHEST PAIN: ICD-10-CM

## 2024-02-01 PROCEDURE — 3074F SYST BP LT 130 MM HG: CPT | Mod: CPTII,S$GLB,, | Performed by: NURSE PRACTITIONER

## 2024-02-01 PROCEDURE — 99214 OFFICE O/P EST MOD 30 MIN: CPT | Mod: S$GLB,,, | Performed by: NURSE PRACTITIONER

## 2024-02-01 PROCEDURE — 99999 PR PBB SHADOW E&M-EST. PATIENT-LVL III: CPT | Mod: PBBFAC,,, | Performed by: NURSE PRACTITIONER

## 2024-02-01 PROCEDURE — 3078F DIAST BP <80 MM HG: CPT | Mod: CPTII,S$GLB,, | Performed by: NURSE PRACTITIONER

## 2024-02-01 PROCEDURE — 93005 ELECTROCARDIOGRAM TRACING: CPT | Mod: S$GLB,,, | Performed by: NURSE PRACTITIONER

## 2024-02-01 PROCEDURE — 93010 ELECTROCARDIOGRAM REPORT: CPT | Mod: S$GLB,,, | Performed by: INTERNAL MEDICINE

## 2024-02-01 PROCEDURE — 3008F BODY MASS INDEX DOCD: CPT | Mod: CPTII,S$GLB,, | Performed by: NURSE PRACTITIONER

## 2024-02-01 NOTE — ASSESSMENT & PLAN NOTE
Patient has atypical chest discomfort.  Discussed with him the option for stress testing.  However since his symptoms have not progressively worsened, we will hold off for now.  Patient to notify us if he has any new or worsening symptoms.

## 2024-02-01 NOTE — ASSESSMENT & PLAN NOTE
EKG does not show any ST or T-wave changes.  Chest discomfort is atypical and not likely related to coronary ischemia.  Recommend continuing with medical management.  Goal for LDL is less than 70.  Recommend low-fat low-cholesterol diet and regular exercise.

## 2024-02-01 NOTE — PROGRESS NOTES
Subjective:    Patient ID:  Jose Alberto Hager is a 58 y.o. male  Chief Complaint   Patient presents with    Follow-up    Chest Pain     Sharp pain in lower left chest   Pain in right side armpit     Muscle Pain     Spazam in throat when relaxed        HPI:    Patient seen today for follow-up appointment.  He reports occasionally having a flip-flop/butterfly feeling in his throat whenever he is at rest.  He states this sensation last only 3 or 4 seconds and then subsides.  It does not occur on a daily basis.  Patient does also still have some pain throughout his chest primarily on the left side which is not associated with exertion.  This pain has been present since his CABG and is not worse or progressing.  He did have 1 episode of pain in his armpits that felt similar to when he has had heart attack in the past.  However he states that this was when he was truly over exerting himself and did not last long.  He has had no recurrence of this discomfort.  Review of patient's allergies indicates:  No Known Allergies    Past Medical History:   Diagnosis Date    Back pain     Coronary artery disease     High cholesterol     Hypertension     Insomnia      Past Surgical History:   Procedure Laterality Date    ANGIOGRAM, CORONARY, WITH LEFT HEART CATHETERIZATION N/A 3/4/2021    Procedure: Angiogram, Coronary, with Left Heart Cath;  Surgeon: Aquilino Rodriguez MD;  Location: [facility] CATH/EP LAB;  Service: Cardiology;  Laterality: N/A;    BACK SURGERY      CARDIAC CATHETERIZATION      CORONARY ARTERY BYPASS GRAFT      CREATION OF BYPASS OF CORONARY ARTERY USING GRAFT WITHOUT CARDIOPULMONARY PUMP OXYGENATION N/A 3/5/2021    Procedure: CABG, WITHOUT CARDIOPULMONARY PUMP OXYGENATION;  Surgeon: Toy Castillo MD;  Location: [facility] OR;  Service: Cardiothoracic;  Laterality: N/A;    KNEE SURGERY Left      Social History     Tobacco Use    Smoking status: Never    Smokeless tobacco: Never   Substance Use Topics    Alcohol use:  No     Family History   Problem Relation Age of Onset    Cancer Father         colon cancer    Brain cancer Mother         tumor        Review of Systems:   Per HPI       Objective:        Vitals:    02/01/24 1310   BP: 120/68       Lab Results   Component Value Date    WBC 7.72 01/28/2023    HGB 14.0 01/28/2023    HCT 43.5 01/28/2023     01/28/2023    CHOL 127 01/27/2023    TRIG 98 01/27/2023    HDL 34 (L) 01/27/2023    ALT 21 01/27/2023    AST 21 01/27/2023     01/28/2023    K 3.9 01/28/2023     01/28/2023    CREATININE 1.2 01/28/2023    BUN 17 01/28/2023    CO2 28 01/28/2023    TSH 1.700 01/27/2023    INR 1.0 01/27/2023    HGBA1C 6.3 (H) 01/28/2023        ECHOCARDIOGRAM RESULTS  Results for orders placed during the hospital encounter of 01/27/23    Echo Saline Bubble? Yes    Interpretation Summary  · The left ventricle is normal in size with mild concentric hypertrophy and normal systolic function.  · There is no evidence of intracardiac shunting.  · The estimated ejection fraction is 65%.  · Normal left ventricular diastolic function.  · Normal right ventricular size with normal right ventricular systolic function.  · Mild left atrial enlargement.  · Mild mitral regurgitation.  · Normal central venous pressure (3 mmHg).  · The estimated PA systolic pressure is 27 mmHg.  · Mild tricuspid regurgitation.        CURRENT/PREVIOUS VISIT EKG  Results for orders placed or performed in visit on 02/01/24   IN OFFICE EKG 12-LEAD (to [place])    Collection Time: 02/01/24  1:17 PM    Narrative    Test Reason : Z95.1,R07.9,    Vent. Rate : 055 BPM     Atrial Rate : 055 BPM     P-R Int : 160 ms          QRS Dur : 088 ms      QT Int : 426 ms       P-R-T Axes : 026 037 060 degrees     QTc Int : 407 ms    Sinus bradycardia  Otherwise normal ECG  When compared with ECG of 27-JAN-2023 13:30,  No significant change was found    Referred By:  NITHYA           Confirmed By:      No valid procedures specified.   Results for  orders placed during the hospital encounter of 01/27/23    Nuclear Stress Test    Interpretation Summary    The ECG portion of the study is negative for ischemia.    The patient reported no chest pain during the stress test.    There were no arrhythmias during stress.    The nuclear portion of this study will be reported separately. The patient exercised for 6 minutes 30 seconds on a Gee protocol, corresponding to a functional capacity of 7.1 METS, achieving a peak heart rate of 144 bpm, which is 88 % of the age predicted maximum heart rate.      Physical Exam:  CONSTITUTIONAL: No fever, no chills  HEENT: Normocephalic, atraumatic,pupils reactive to light                 NECK:  No JVD no carotid bruit  CVS: S1S2+, RR  LUNGS: Clear  ABDOMEN: Soft, NT, BS+  EXTREMITIES: No cyanosis, edema  : No lynn catheter  NEURO: AAO X 3  PSY: Normal affect      Medication List with Changes/Refills   Current Medications    ASPIRIN (ECOTRIN) 81 MG EC TABLET    Take 81 mg by mouth once daily.    ATORVASTATIN (LIPITOR) 40 MG TABLET    Take 1 tablet by mouth once daily.    BACLOFEN (LIORESAL) 10 MG TABLET    Take 10 mg by mouth 2 (two) times daily as needed.    DILTIAZEM (TIAZAC) 180 MG CS24    Take 180 mg by mouth.    DOCUSATE SODIUM (COLACE) 100 MG CAPSULE    Take 1 capsule (100 mg total) by mouth 2 (two) times daily.    FLUTICASONE PROPIONATE (FLONASE) 50 MCG/ACTUATION NASAL SPRAY    1 spray by Each Nostril route.    GABAPENTIN (NEURONTIN) 300 MG CAPSULE    Take 300 mg by mouth 2 (two) times daily as needed.    MELOXICAM (MOBIC) 15 MG TABLET    Take 15 mg by mouth as needed.    METOPROLOL SUCCINATE (TOPROL-XL) 25 MG 24 HR TABLET    Take 1 tablet (25 mg total) by mouth once daily.    MORPHINE (MSIR) 15 MG TABLET    Take 15 mg by mouth 3 (three) times daily as needed.    MULTIVIT-MINS/IRON/FOLIC/LYCOP (CENTRUM ULTRA MEN'S ORAL)    Take 1 Dose by mouth once daily.    OMEPRAZOLE (PRILOSEC) 40 MG CAPSULE    Take 40 mg by mouth once  daily.    RAMIPRIL (ALTACE) 2.5 MG CAPSULE    Take 1 capsule (2.5 mg total) by mouth every evening.    SILDENAFIL (VIAGRA) 100 MG TABLET    Take 100 mg by mouth as needed.             Assessment:       1. S/P CABG x 1    2. Chest pain, unspecified type    3. Coronary artery disease involving native coronary artery of native heart without angina pectoris    4. Atypical chest pain    5. Palpitations         Plan:     Problem List Items Addressed This Visit          Unprioritized    Atypical chest pain    Current Assessment & Plan     Patient has atypical chest discomfort.  Discussed with him the option for stress testing.  However since his symptoms have not progressively worsened, we will hold off for now.  Patient to notify us if he has any new or worsening symptoms.         CAD (coronary artery disease)    Overview     Conclusion  · The Ost LAD lesion was 95% stenosed.  · The Dist LAD lesion was 50% stenosed.  · The estimated blood loss was <50 mL.  · There was single vessel coronary artery disease.    The procedure log was documented by Documenter: RT Walker and verified by Aquilino Rodriguez MD.    Date: 3/6/2021  Time: 2:36 PM         Current Assessment & Plan     EKG does not show any ST or T-wave changes.  Chest discomfort is atypical and not likely related to coronary ischemia.  Recommend continuing with medical management.  Goal for LDL is less than 70.  Recommend low-fat low-cholesterol diet and regular exercise.         S/P CABG x 1 - Primary    Overview     LIMA to the left anterior descending artery of pump Dr. Dye 03/04/2021         Relevant Orders    IN OFFICE EKG 12-LEAD (to [place])    Palpitations    Current Assessment & Plan     Patient has mild transient palpitations.  Recommend patient to start magnesium oxide 400 mg p.o. daily.  Discussed with patient the option for cardiac event monitor however he prefers to defer this for now.          Other Visit Diagnoses       Chest pain,  unspecified type        Relevant Orders    IN OFFICE EKG 12-LEAD (to [place])            Follow up in about 4 months (around 6/1/2024).

## 2024-02-01 NOTE — ASSESSMENT & PLAN NOTE
Patient has mild transient palpitations.  Recommend patient to start magnesium oxide 400 mg p.o. daily.  Discussed with patient the option for cardiac event monitor however he prefers to defer this for now.

## 2024-02-21 LAB
OHS QRS DURATION: 88 MS
OHS QTC CALCULATION: 407 MS

## 2024-03-18 DIAGNOSIS — I25.10 CORONARY ARTERY DISEASE INVOLVING NATIVE CORONARY ARTERY OF NATIVE HEART WITHOUT ANGINA PECTORIS: ICD-10-CM

## 2024-03-18 DIAGNOSIS — Z95.1 S/P CABG X 1: ICD-10-CM

## 2024-03-18 DIAGNOSIS — I10 PRIMARY HYPERTENSION: ICD-10-CM

## 2024-03-18 RX ORDER — METOPROLOL SUCCINATE 25 MG/1
25 TABLET, EXTENDED RELEASE ORAL
Qty: 90 TABLET | Refills: 3 | Status: SHIPPED | OUTPATIENT
Start: 2024-03-18

## 2024-04-15 RX ORDER — RAMIPRIL 2.5 MG/1
2.5 CAPSULE ORAL NIGHTLY
Qty: 90 CAPSULE | Refills: 3 | Status: SHIPPED | OUTPATIENT
Start: 2024-04-15

## 2024-05-31 ENCOUNTER — TELEPHONE (OUTPATIENT)
Dept: CARDIOLOGY | Facility: CLINIC | Age: 59
End: 2024-05-31
Payer: MEDICARE

## 2024-05-31 NOTE — TELEPHONE ENCOUNTER
----- Message from Matthew Vázquez sent at 5/31/2024 11:17 AM CDT -----  Type: Needs Medical Advice  Who Called:  pt  Symptoms (please be specific):  pt said he need order for blood work before his appt 6/4 with the provider--please call and advise  Best Call Back Number: [phone]    Additional Information: thank you

## 2024-06-03 ENCOUNTER — TELEPHONE (OUTPATIENT)
Dept: CARDIOLOGY | Facility: CLINIC | Age: 59
End: 2024-06-03
Payer: MEDICARE

## 2024-06-03 NOTE — TELEPHONE ENCOUNTER
----- Message from Matthew Vázquez sent at 6/3/2024  2:37 PM CDT -----  Type: Needs Medical Advice  Who Called:  pt   Symptoms (please be specific):  pt said he need to speak to the nurse said had the office to send his orders for his labs to the wrong place--said he wanted then to go to [facility]--please call and advise  Best Call Back Number: [phone]    Additional Information: thank you

## 2024-07-10 ENCOUNTER — OFFICE VISIT (OUTPATIENT)
Dept: CARDIOLOGY | Facility: CLINIC | Age: 59
End: 2024-07-10
Payer: MEDICARE

## 2024-07-10 VITALS
BODY MASS INDEX: 29.73 KG/M2 | DIASTOLIC BLOOD PRESSURE: 70 MMHG | HEART RATE: 63 BPM | OXYGEN SATURATION: 97 % | HEIGHT: 69 IN | SYSTOLIC BLOOD PRESSURE: 132 MMHG | WEIGHT: 200.75 LBS

## 2024-07-10 DIAGNOSIS — I10 ESSENTIAL HYPERTENSION: ICD-10-CM

## 2024-07-10 DIAGNOSIS — E78.00 HYPERCHOLESTEROLEMIA: ICD-10-CM

## 2024-07-10 DIAGNOSIS — I10 PRIMARY HYPERTENSION: ICD-10-CM

## 2024-07-10 DIAGNOSIS — I25.10 CORONARY ARTERY DISEASE INVOLVING NATIVE CORONARY ARTERY OF NATIVE HEART WITHOUT ANGINA PECTORIS: ICD-10-CM

## 2024-07-10 DIAGNOSIS — R00.2 PALPITATIONS: ICD-10-CM

## 2024-07-10 DIAGNOSIS — R07.9 CHEST PAIN, UNSPECIFIED TYPE: ICD-10-CM

## 2024-07-10 DIAGNOSIS — Z95.1 S/P CABG X 1: Primary | ICD-10-CM

## 2024-07-10 PROCEDURE — 3078F DIAST BP <80 MM HG: CPT | Mod: CPTII,S$GLB,, | Performed by: INTERNAL MEDICINE

## 2024-07-10 PROCEDURE — 99999 PR PBB SHADOW E&M-EST. PATIENT-LVL IV: CPT | Mod: PBBFAC,,, | Performed by: INTERNAL MEDICINE

## 2024-07-10 PROCEDURE — 4010F ACE/ARB THERAPY RXD/TAKEN: CPT | Mod: CPTII,S$GLB,, | Performed by: INTERNAL MEDICINE

## 2024-07-10 PROCEDURE — 3075F SYST BP GE 130 - 139MM HG: CPT | Mod: CPTII,S$GLB,, | Performed by: INTERNAL MEDICINE

## 2024-07-10 PROCEDURE — 3008F BODY MASS INDEX DOCD: CPT | Mod: CPTII,S$GLB,, | Performed by: INTERNAL MEDICINE

## 2024-07-10 PROCEDURE — 1159F MED LIST DOCD IN RCRD: CPT | Mod: CPTII,S$GLB,, | Performed by: INTERNAL MEDICINE

## 2024-07-10 PROCEDURE — 99213 OFFICE O/P EST LOW 20 MIN: CPT | Mod: S$GLB,,, | Performed by: INTERNAL MEDICINE

## 2024-07-10 PROCEDURE — 1160F RVW MEDS BY RX/DR IN RCRD: CPT | Mod: CPTII,S$GLB,, | Performed by: INTERNAL MEDICINE

## 2024-07-10 NOTE — PROGRESS NOTES
Patient ID:  Jose Alberto Hager is a 58 y.o. male who presents for follow-up of Coronary Artery Disease, Hyperlipidemia, Results (labs), and Shoulder Pain      CAD (coronary artery disease)  No further chest pain.     S/P CABG x 1  Status post LIMA to the LAD off pump     Hypercholesterolemia  Controlled on 40 mg of atorvastatin    He presents to the office complaining of right shoulder pain and burning of the right arm.  He pulled a muscle in his shoulder.  He also complains of right-sided chest pains and dyspnea on exertion.  He has been diagnosed of having obstructive sleep apnea and he just got the machine.  The laboratory data was review from [place].  He has history of chronic pain treated with morphine 3 times a day.        Past Medical History:   Diagnosis Date    Back pain     Coronary artery disease     High cholesterol     Hypertension     Insomnia         Past Surgical History:   Procedure Laterality Date    ANGIOGRAM, CORONARY, WITH LEFT HEART CATHETERIZATION N/A 3/4/2021    Procedure: Angiogram, Coronary, with Left Heart Cath;  Surgeon: Aquilino Rodriguez MD;  Location: [facility] CATH/EP LAB;  Service: Cardiology;  Laterality: N/A;    BACK SURGERY      CARDIAC CATHETERIZATION      CORONARY ARTERY BYPASS GRAFT      CREATION OF BYPASS OF CORONARY ARTERY USING GRAFT WITHOUT CARDIOPULMONARY PUMP OXYGENATION N/A 3/5/2021    Procedure: CABG, WITHOUT CARDIOPULMONARY PUMP OXYGENATION;  Surgeon: Toy Castillo MD;  Location: [facility] OR;  Service: Cardiothoracic;  Laterality: N/A;    KNEE SURGERY Left           Current Outpatient Medications   Medication Instructions    aspirin (ECOTRIN) 81 mg, Oral, Daily    atorvastatin (LIPITOR) 40 MG tablet 1 tablet, Oral, Daily    baclofen (LIORESAL) 10 mg, Oral, 2 times daily PRN    diltiaZEM (TIAZAC) 180 mg, Oral    docusate sodium (COLACE) 100 mg, Oral, 2 times daily    fluticasone propionate (FLONASE) 50 mcg/actuation nasal spray 1 spray, Each Nostril     "gabapentin (NEURONTIN) 300 mg, Oral, 2 times daily PRN    meloxicam (MOBIC) 15 mg, Oral, As needed (PRN)    metoprolol succinate (TOPROL-XL) 25 mg, Oral    morphine (MSIR) 15 mg, Oral, 3 times daily PRN    multivit-mins/iron/folic/lycop (CENTRUM ULTRA MEN'S ORAL) 1 Dose, Oral, Daily    omeprazole (PRILOSEC) 40 mg, Oral, Daily    ramipriL (ALTACE) 2.5 mg, Oral, Nightly    sildenafiL (VIAGRA) 100 mg, Oral, As needed (PRN)        Review of patient's allergies indicates:  No Known Allergies     Review of Systems   Cardiovascular:  Positive for dyspnea on exertion and palpitations. Negative for chest pain and irregular heartbeat.   Respiratory:  Negative for cough and shortness of breath.         Objective:     Vitals:    07/10/24 1118   BP: 132/70   BP Location: Left arm   Patient Position: Sitting   BP Method: Medium (Manual)   Pulse: 63   SpO2: 97%   Weight: 91 kg (200 lb 11.7 oz)   Height: 5' 9" (1.753 m)       Physical Exam  Vitals and nursing note reviewed.   Constitutional:       Appearance: He is well-developed.   HENT:      Head: Normocephalic and atraumatic.   Eyes:      Conjunctiva/sclera: Conjunctivae normal.   Cardiovascular:      Rate and Rhythm: Normal rate and regular rhythm.      Heart sounds: Normal heart sounds.   Pulmonary:      Effort: Pulmonary effort is normal.      Breath sounds: Normal breath sounds.   Abdominal:      General: Bowel sounds are normal.      Palpations: Abdomen is soft.   Musculoskeletal:         General: Normal range of motion.   Skin:     General: Skin is warm and dry.   Neurological:      Mental Status: He is alert and oriented to person, place, and time.   Psychiatric:         Behavior: Behavior normal.         Thought Content: Thought content normal.         Judgment: Judgment normal.       CMP  Sodium   Date Value Ref Range Status   01/28/2023 138 136 - 145 mmol/L Final     Potassium   Date Value Ref Range Status   01/28/2023 3.9 3.5 - 5.1 mmol/L Final     Chloride   Date " Value Ref Range Status   01/28/2023 103 95 - 110 mmol/L Final     CO2   Date Value Ref Range Status   01/28/2023 28 23 - 29 mmol/L Final     Glucose   Date Value Ref Range Status   01/28/2023 134 (H) 70 - 110 mg/dL Final     BUN   Date Value Ref Range Status   01/28/2023 17 6 - 20 mg/dL Final     Creatinine   Date Value Ref Range Status   01/28/2023 1.2 0.5 - 1.4 mg/dL Final     Calcium   Date Value Ref Range Status   01/28/2023 9.0 8.7 - 10.5 mg/dL Final     Total Protein   Date Value Ref Range Status   01/27/2023 7.7 6.0 - 8.4 g/dL Final     Albumin   Date Value Ref Range Status   01/27/2023 4.6 3.5 - 5.2 g/dL Final     Total Bilirubin   Date Value Ref Range Status   01/27/2023 0.9 0.1 - 1.0 mg/dL Final     Comment:     For infants and newborns, interpretation of results should be based  on gestational age, weight and in agreement with clinical  observations.    Premature Infant recommended reference ranges:  Up to 24 hours.............<8.0 mg/dL  Up to 48 hours............<12.0 mg/dL  3-5 days..................<15.0 mg/dL  6-29 days.................<15.0 mg/dL       Alkaline Phosphatase   Date Value Ref Range Status   01/27/2023 79 55 - 135 U/L Final     AST   Date Value Ref Range Status   01/27/2023 21 10 - 40 U/L Final     ALT   Date Value Ref Range Status   01/27/2023 21 10 - 44 U/L Final     Anion Gap   Date Value Ref Range Status   01/28/2023 7 (L) 8 - 16 mmol/L Final     eGFR if    Date Value Ref Range Status   03/08/2021 >60.0 >60 mL/min/1.73 m^2 Final     eGFR if non    Date Value Ref Range Status   03/08/2021 >60.0 >60 mL/min/1.73 m^2 Final     Comment:     Calculation used to obtain the estimated glomerular filtration  rate (eGFR) is the CKD-EPI equation.         BMP  Lab Results   Component Value Date     01/28/2023    K 3.9 01/28/2023     01/28/2023    CO2 28 01/28/2023    BUN 17 01/28/2023    CREATININE 1.2 01/28/2023    CALCIUM 9.0 01/28/2023    ANIONGAP 7  (L) 01/28/2023    ESTGFRAFRICA >60.0 03/08/2021    EGFRNONAA >60.0 03/08/2021      BNP  @LABRCNTIP(BNP,BNPTRIAGEBLO)@   Lab Results   Component Value Date    CHOL 127 01/27/2023    CHOL 191 03/04/2021     Lab Results   Component Value Date    HDL 34 (L) 01/27/2023    HDL 32 (L) 03/04/2021     Lab Results   Component Value Date    LDLCALC 73.4 01/27/2023    LDLCALC 140.4 03/04/2021     Lab Results   Component Value Date    TRIG 98 01/27/2023    TRIG 93 03/04/2021     Lab Results   Component Value Date    CHOLHDL 26.8 01/27/2023    CHOLHDL 16.8 (L) 03/04/2021      Lab Results   Component Value Date    TSH 1.700 01/27/2023     Lab Results   Component Value Date    HGBA1C 6.3 (H) 01/28/2023     Lab Results   Component Value Date    WBC 7.72 01/28/2023    HGB 14.0 01/28/2023    HCT 43.5 01/28/2023    MCV 86 01/28/2023     01/28/2023         Results for orders placed during the hospital encounter of 01/27/23    Echo Saline Bubble? Yes    Interpretation Summary  · The left ventricle is normal in size with mild concentric hypertrophy and normal systolic function.  · There is no evidence of intracardiac shunting.  · The estimated ejection fraction is 65%.  · Normal left ventricular diastolic function.  · Normal right ventricular size with normal right ventricular systolic function.  · Mild left atrial enlargement.  · Mild mitral regurgitation.  · Normal central venous pressure (3 mmHg).  · The estimated PA systolic pressure is 27 mmHg.  · Mild tricuspid regurgitation.     Results for orders placed during the hospital encounter of 03/03/21    Cardiac catheterization    Conclusion  · The Ost LAD lesion was 95% stenosed.  · The Dist LAD lesion was 50% stenosed.  · The estimated blood loss was <50 mL.  · There was single vessel coronary artery disease.    The procedure log was documented by Documenter: RT Walker and verified by Aquilino Rodriguez MD.    Date: 3/6/2021  Time: 2:36 PM     EKG  Results for orders  placed or performed in visit on 02/01/24   IN OFFICE EKG 12-LEAD (to [place])    Collection Time: 02/01/24  1:17 PM   Result Value Ref Range    QRS Duration 88 ms    OHS QTC Calculation 407 ms    Narrative    Test Reason : Z95.1,R07.9,    Vent. Rate : 055 BPM     Atrial Rate : 055 BPM     P-R Int : 160 ms          QRS Dur : 088 ms      QT Int : 426 ms       P-R-T Axes : 026 037 060 degrees     QTc Int : 407 ms    Sinus bradycardia  Otherwise normal ECG  When compared with ECG of 27-JAN-2023 13:30,  No significant change was found  Confirmed by Elaine MARTINEZ, Javi Alonso ([id number]) on 2/21/2024 9:41:42 PM    Referred By:  NITHYA           Confirmed By:Javi Henry MD      Stress  Results for orders placed during the hospital encounter of 01/27/23    Nuclear Stress Test    Interpretation Summary    The ECG portion of the study is negative for ischemia.    The patient reported no chest pain during the stress test.    There were no arrhythmias during stress.    The nuclear portion of this study will be reported separately. The patient exercised for 6 minutes 30 seconds on a Gee protocol, corresponding to a functional capacity of 7.1 METS, achieving a peak heart rate of 144 bpm, which is 88 % of the age predicted maximum heart rate.             Assessment:       S/P CABG x 1  Status post coronary artery bypass HOYT to the LAD Dr. Castillo    CAD (coronary artery disease)  He is having atypical chest pains back pain will go ahead and obtain stress Myoview to rule out ischemia    Hypercholesterolemia  On 40 mg of atorvastatin    Hypertension  On metoprolol XL 25 mg daily, diltiazem 180 mg daily ramipril 2.5 mg q.h.s.    Palpitations  Controlled on diltiazem and metoprolol       Plan:       Perform an echocardiogram, perform a stress Myoview to rule out myocardial ischemia.  He will be seen in the office in 2-3 months.

## 2024-07-29 NOTE — ASSESSMENT & PLAN NOTE
He is having atypical chest pains back pain will go ahead and obtain stress Myoview to rule out ischemia

## 2024-08-02 ENCOUNTER — TELEPHONE (OUTPATIENT)
Dept: CARDIOLOGY | Facility: HOSPITAL | Age: 59
End: 2024-08-02

## 2024-08-02 NOTE — TELEPHONE ENCOUNTER
Nuclear Stress (talked to patient)  Patient advised, test will be at [facility] ([address]  Will need to register on the first floor at the main entrance.  Patient advised that arrival time is 7:00.  Patient advised that he may be here about 3.5-4 hours, and may want to bring something to occupy their time, as there will be periods of waiting.    Patient advised, may take his medications prior to testing if you need to.  Patient should HOLD metoprolol. Advised if he needs to eat to take his medications, please keep it light, like toast and juice.    Patient advised to avoid all caffeine 12 hours prior to testing.  This includes decaf tea and coffee.    Will provide peanut butter crackers for a snack after stress test.  If patient would prefer something else, please bring a snack from home.    Wear comfortable clothing.  No lotions, oils, or powders to the upper chest area. May wear deodorant.    No metal jewelry, buttons, or zippers to the upper body.  Patient verbalizes understanding of instructions.

## 2024-08-05 ENCOUNTER — HOSPITAL ENCOUNTER (OUTPATIENT)
Dept: RADIOLOGY | Facility: HOSPITAL | Age: 59
Discharge: HOME | End: 2024-08-05
Attending: INTERNAL MEDICINE
Payer: MEDICARE

## 2024-08-05 ENCOUNTER — HOSPITAL ENCOUNTER (OUTPATIENT)
Dept: CARDIOLOGY | Facility: HOSPITAL | Age: 59
Discharge: HOME | End: 2024-08-05
Attending: INTERNAL MEDICINE
Payer: MEDICARE

## 2024-08-05 VITALS — HEIGHT: 69 IN | WEIGHT: 200 LBS | BODY MASS INDEX: 29.62 KG/M2

## 2024-08-05 DIAGNOSIS — I10 ESSENTIAL HYPERTENSION: ICD-10-CM

## 2024-08-05 DIAGNOSIS — Z95.1 S/P CABG X 1: ICD-10-CM

## 2024-08-05 DIAGNOSIS — R00.2 PALPITATIONS: ICD-10-CM

## 2024-08-05 DIAGNOSIS — R07.9 CHEST PAIN, UNSPECIFIED TYPE: ICD-10-CM

## 2024-08-05 PROCEDURE — A9502 TC99M TETROFOSMIN: HCPCS | Performed by: INTERNAL MEDICINE

## 2024-08-05 PROCEDURE — 93306 TTE W/DOPPLER COMPLETE: CPT

## 2024-08-05 PROCEDURE — 93016 CV STRESS TEST SUPVJ ONLY: CPT | Mod: ,,, | Performed by: NURSE PRACTITIONER

## 2024-08-05 PROCEDURE — 93018 CV STRESS TEST I&R ONLY: CPT | Mod: ,,, | Performed by: INTERNAL MEDICINE

## 2024-08-05 PROCEDURE — 93017 CV STRESS TEST TRACING ONLY: CPT

## 2024-08-05 PROCEDURE — 78452 HT MUSCLE IMAGE SPECT MULT: CPT | Mod: 26,,, | Performed by: INTERNAL MEDICINE

## 2024-08-05 PROCEDURE — 93306 TTE W/DOPPLER COMPLETE: CPT | Mod: 26,,, | Performed by: INTERNAL MEDICINE

## 2024-08-05 PROCEDURE — 78452 HT MUSCLE IMAGE SPECT MULT: CPT

## 2024-08-05 RX ADMIN — TETROFOSMIN 12 MILLICURIE: 1.38 INJECTION, POWDER, LYOPHILIZED, FOR SOLUTION INTRAVENOUS at 07:08

## 2024-08-05 RX ADMIN — TETROFOSMIN 25.5 MILLICURIE: 1.38 INJECTION, POWDER, LYOPHILIZED, FOR SOLUTION INTRAVENOUS at 08:08

## 2024-08-06 LAB
AORTIC ROOT ANNULUS: 4 CM
AORTIC VALVE CUSP SEPERATION: 2.3 CM
AV INDEX (PROSTH): 0.93
AV MEAN GRADIENT: 3 MMHG
AV PEAK GRADIENT: 6 MMHG
AV VALVE AREA BY VELOCITY RATIO: 3.37 CM²
AV VALVE AREA: 3.23 CM²
AV VELOCITY RATIO: 0.97
BSA FOR ECHO PROCEDURE: 2.1 M2
CV ECHO LV RWT: 0.57 CM
CV STRESS BASE HR: 55 BPM
DIASTOLIC BLOOD PRESSURE: 78 MMHG
DOP CALC AO PEAK VEL: 1.19 M/S
DOP CALC AO VTI: 27.3 CM
DOP CALC LVOT AREA: 3.5 CM2
DOP CALC LVOT DIAMETER: 2.1 CM
DOP CALC LVOT PEAK VEL: 1.16 M/S
DOP CALC LVOT STROKE VOLUME: 88.28 CM3
DOP CALCLVOT PEAK VEL VTI: 25.5 CM
E WAVE DECELERATION TIME: 237 MSEC
E/A RATIO: 1.17
E/E' RATIO: 9.05 M/S
ECHO LV POSTERIOR WALL: 1.4 CM (ref 0.6–1.1)
EJECTION FRACTION- HIGH: 65 %
EJECTION FRACTION: 60 %
END DIASTOLIC INDEX-HIGH: 153 ML/M2
END DIASTOLIC INDEX-LOW: 93 ML/M2
END SYSTOLIC INDEX-HIGH: 71 ML/M2
END SYSTOLIC INDEX-LOW: 31 ML/M2
FRACTIONAL SHORTENING: 34 % (ref 28–44)
INTERVENTRICULAR SEPTUM: 1.1 CM (ref 0.6–1.1)
IVRT: 127 MSEC
LEFT ATRIUM SIZE: 4.4 CM
LEFT INTERNAL DIMENSION IN SYSTOLE: 3.25 CM (ref 2.1–4)
LEFT VENTRICLE DIASTOLIC VOLUME INDEX: 54.59 ML/M2
LEFT VENTRICLE DIASTOLIC VOLUME: 113 ML
LEFT VENTRICLE MASS INDEX: 116 G/M2
LEFT VENTRICLE SYSTOLIC VOLUME INDEX: 20.5 ML/M2
LEFT VENTRICLE SYSTOLIC VOLUME: 42.5 ML
LEFT VENTRICULAR INTERNAL DIMENSION IN DIASTOLE: 4.91 CM (ref 3.5–6)
LEFT VENTRICULAR MASS: 240.63 G
LV LATERAL E/E' RATIO: 8.64 M/S
LV SEPTAL E/E' RATIO: 9.5 M/S
LVED V (TEICH): 113 ML
LVES V (TEICH): 42.5 ML
LVOT MG: 3 MMHG
LVOT MV: 0.74 CM/S
MV PEAK A VEL: 0.81 M/S
MV PEAK E VEL: 0.95 M/S
MV STENOSIS PRESSURE HALF TIME: 58 MS
MV VALVE AREA P 1/2 METHOD: 3.79 CM2
NUC REST DIASTOLIC VOLUME INDEX: 109
NUC REST EJECTION FRACTION: 54
NUC REST SYSTOLIC VOLUME INDEX: 50
NUC STRESS DIASTOLIC VOLUME INDEX: 114
NUC STRESS EJECTION FRACTION: 74 %
NUC STRESS SYSTOLIC VOLUME INDEX: 30
OHS CV CPX 1 MINUTE RECOVERY HEART RATE: 120 BPM
OHS CV CPX 85 PERCENT MAX PREDICTED HEART RATE MALE: 139
OHS CV CPX ESTIMATED METS: 10
OHS CV CPX MAX PREDICTED HEART RATE: 163
OHS CV CPX PATIENT IS FEMALE: 0
OHS CV CPX PATIENT IS MALE: 1
OHS CV CPX PEAK DIASTOLIC BLOOD PRESSURE: 88 MMHG
OHS CV CPX PEAK HEAR RATE: 163 BPM
OHS CV CPX PEAK RATE PRESSURE PRODUCT: NORMAL
OHS CV CPX PEAK SYSTOLIC BLOOD PRESSURE: 148 MMHG
OHS CV CPX PERCENT MAX PREDICTED HEART RATE ACHIEVED: 100
OHS CV CPX RATE PRESSURE PRODUCT PRESENTING: 7040
OHS CV RV/LV RATIO: 0.7 CM
RA PRESSURE ESTIMATED: 3 MMHG
RETIRED EF AND QEF - SEE NOTES: 53 %
RIGHT VENTRICULAR END-DIASTOLIC DIMENSION: 3.46 CM
STRESS ECHO POST EXERCISE DUR MIN: 8 MINUTES
STRESS ECHO POST EXERCISE DUR SEC: 26 SECONDS
SYSTOLIC BLOOD PRESSURE: 128 MMHG
TDI LATERAL: 0.11 M/S
TDI SEPTAL: 0.1 M/S
TDI: 0.11 M/S
Z-SCORE OF LEFT VENTRICULAR DIMENSION IN END DIASTOLE: -2.48
Z-SCORE OF LEFT VENTRICULAR DIMENSION IN END SYSTOLE: -1.34

## 2024-11-20 ENCOUNTER — HOSPITAL ENCOUNTER (EMERGENCY)
Facility: HOSPITAL | Age: 59
Discharge: HOME | End: 2024-11-20
Attending: EMERGENCY MEDICINE
Payer: MEDICARE

## 2024-11-20 VITALS
RESPIRATION RATE: 20 BRPM | WEIGHT: 200 LBS | HEIGHT: 69 IN | HEART RATE: 75 BPM | BODY MASS INDEX: 29.62 KG/M2 | SYSTOLIC BLOOD PRESSURE: 170 MMHG | TEMPERATURE: 99 F | OXYGEN SATURATION: 97 % | DIASTOLIC BLOOD PRESSURE: 75 MMHG

## 2024-11-20 DIAGNOSIS — S06.0X1A CONCUSSION WITH LOSS OF CONSCIOUSNESS OF 30 MINUTES OR LESS, INITIAL ENCOUNTER: Primary | ICD-10-CM

## 2024-11-20 DIAGNOSIS — S29.9XXA CHEST WALL TRAUMA: ICD-10-CM

## 2024-11-20 DIAGNOSIS — M54.50 ACUTE BILATERAL LOW BACK PAIN WITHOUT SCIATICA: ICD-10-CM

## 2024-11-20 LAB
ALBUMIN SERPL BCP-MCNC: 4.4 G/DL (ref 3.5–5.2)
ALP SERPL-CCNC: 71 U/L (ref 55–135)
ALT SERPL W/O P-5'-P-CCNC: 18 U/L (ref 10–44)
ANION GAP SERPL CALC-SCNC: 7 MMOL/L (ref 8–16)
AST SERPL-CCNC: 21 U/L (ref 10–40)
BASOPHILS # BLD AUTO: 0.05 K/UL (ref 0–0.2)
BASOPHILS NFR BLD: 0.6 % (ref 0–1.9)
BILIRUB SERPL-MCNC: 0.6 MG/DL (ref 0.1–1)
BUN SERPL-MCNC: 15 MG/DL (ref 6–20)
CALCIUM SERPL-MCNC: 9.4 MG/DL (ref 8.7–10.5)
CHLORIDE SERPL-SCNC: 105 MMOL/L (ref 95–110)
CO2 SERPL-SCNC: 26 MMOL/L (ref 23–29)
CREAT SERPL-MCNC: 1.1 MG/DL (ref 0.5–1.4)
CREAT SERPL-MCNC: 1.1 MG/DL (ref 0.5–1.4)
DIFFERENTIAL METHOD BLD: ABNORMAL
EOSINOPHIL # BLD AUTO: 0.6 K/UL (ref 0–0.5)
EOSINOPHIL NFR BLD: 7.2 % (ref 0–8)
ERYTHROCYTE [DISTWIDTH] IN BLOOD BY AUTOMATED COUNT: 13 % (ref 11.5–14.5)
EST. GFR  (NO RACE VARIABLE): >60 ML/MIN/1.73 M^2
GLUCOSE SERPL-MCNC: 104 MG/DL (ref 70–110)
HCT VFR BLD AUTO: 40.9 % (ref 40–54)
HCV AB SERPL QL IA: NEGATIVE
HGB BLD-MCNC: 13.3 G/DL (ref 14–18)
HIV 1+2 AB+HIV1 P24 AG SERPL QL IA: NEGATIVE
IMM GRANULOCYTES # BLD AUTO: 0.02 K/UL (ref 0–0.04)
IMM GRANULOCYTES NFR BLD AUTO: 0.2 % (ref 0–0.5)
LYMPHOCYTES # BLD AUTO: 3.1 K/UL (ref 1–4.8)
LYMPHOCYTES NFR BLD: 38.1 % (ref 18–48)
MCH RBC QN AUTO: 27.4 PG (ref 27–31)
MCHC RBC AUTO-ENTMCNC: 32.5 G/DL (ref 32–36)
MCV RBC AUTO: 84 FL (ref 82–98)
MONOCYTES # BLD AUTO: 0.6 K/UL (ref 0.3–1)
MONOCYTES NFR BLD: 7.7 % (ref 4–15)
NEUTROPHILS # BLD AUTO: 3.7 K/UL (ref 1.8–7.7)
NEUTROPHILS NFR BLD: 46.2 % (ref 38–73)
NRBC BLD-RTO: 0 /100 WBC
PLATELET # BLD AUTO: 188 K/UL (ref 150–450)
PMV BLD AUTO: 11.1 FL (ref 9.2–12.9)
POTASSIUM SERPL-SCNC: 3.8 MMOL/L (ref 3.5–5.1)
PROT SERPL-MCNC: 7 G/DL (ref 6–8.4)
RBC # BLD AUTO: 4.86 M/UL (ref 4.6–6.2)
SAMPLE: NORMAL
SODIUM SERPL-SCNC: 138 MMOL/L (ref 136–145)
WBC # BLD AUTO: 8.09 K/UL (ref 3.9–12.7)

## 2024-11-20 PROCEDURE — 93010 ELECTROCARDIOGRAM REPORT: CPT | Mod: ,,, | Performed by: INTERNAL MEDICINE

## 2024-11-20 PROCEDURE — 93005 ELECTROCARDIOGRAM TRACING: CPT | Performed by: INTERNAL MEDICINE

## 2024-11-20 PROCEDURE — 63600175 PHARM REV CODE 636 W HCPCS: Performed by: EMERGENCY MEDICINE

## 2024-11-20 PROCEDURE — 87389 HIV-1 AG W/HIV-1&-2 AB AG IA: CPT | Performed by: EMERGENCY MEDICINE

## 2024-11-20 PROCEDURE — 85025 COMPLETE CBC W/AUTO DIFF WBC: CPT | Performed by: EMERGENCY MEDICINE

## 2024-11-20 PROCEDURE — 99285 EMERGENCY DEPT VISIT HI MDM: CPT | Mod: 25

## 2024-11-20 PROCEDURE — 82565 ASSAY OF CREATININE: CPT

## 2024-11-20 PROCEDURE — 96375 TX/PRO/DX INJ NEW DRUG ADDON: CPT

## 2024-11-20 PROCEDURE — 80053 COMPREHEN METABOLIC PANEL: CPT | Performed by: EMERGENCY MEDICINE

## 2024-11-20 PROCEDURE — 86803 HEPATITIS C AB TEST: CPT | Performed by: EMERGENCY MEDICINE

## 2024-11-20 PROCEDURE — 96374 THER/PROPH/DIAG INJ IV PUSH: CPT

## 2024-11-20 PROCEDURE — 25500020 PHARM REV CODE 255: Performed by: EMERGENCY MEDICINE

## 2024-11-20 RX ORDER — HYDROMORPHONE HYDROCHLORIDE 1 MG/ML
1 INJECTION, SOLUTION INTRAMUSCULAR; INTRAVENOUS; SUBCUTANEOUS
Status: COMPLETED | OUTPATIENT
Start: 2024-11-20 | End: 2024-11-20

## 2024-11-20 RX ORDER — KETOROLAC TROMETHAMINE 30 MG/ML
15 INJECTION, SOLUTION INTRAMUSCULAR; INTRAVENOUS
Status: COMPLETED | OUTPATIENT
Start: 2024-11-20 | End: 2024-11-20

## 2024-11-20 RX ADMIN — HYDROMORPHONE HYDROCHLORIDE 1 MG: 0.5 INJECTION, SOLUTION INTRAMUSCULAR; INTRAVENOUS; SUBCUTANEOUS at 04:11

## 2024-11-20 RX ADMIN — IOHEXOL 100 ML: 350 INJECTION, SOLUTION INTRAVENOUS at 04:11

## 2024-11-20 RX ADMIN — KETOROLAC TROMETHAMINE 15 MG: 30 INJECTION, SOLUTION INTRAMUSCULAR at 04:11
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
## 2024-11-21 LAB
OHS QRS DURATION: 84 MS
OHS QTC CALCULATION: 419 MS

## 2024-11-22 ENCOUNTER — OCHSNER VIRTUAL EMERGENCY DEPARTMENT (OUTPATIENT)
Facility: CLINIC | Age: 59
End: 2024-11-22
Payer: MEDICARE

## 2024-11-22 ENCOUNTER — NURSE TRIAGE (OUTPATIENT)
Dept: ADMINISTRATIVE | Facility: CLINIC | Age: 59
End: 2024-11-22
Payer: MEDICARE

## 2024-11-22 ENCOUNTER — OFFICE VISIT (OUTPATIENT)
Dept: URGENT CARE | Facility: CLINIC | Age: 59
End: 2024-11-22
Payer: MEDICARE

## 2024-11-22 VITALS
HEART RATE: 64 BPM | DIASTOLIC BLOOD PRESSURE: 79 MMHG | OXYGEN SATURATION: 95 % | RESPIRATION RATE: 18 BRPM | SYSTOLIC BLOOD PRESSURE: 130 MMHG | TEMPERATURE: 98 F | BODY MASS INDEX: 29.62 KG/M2 | WEIGHT: 200 LBS | HEIGHT: 69 IN

## 2024-11-22 DIAGNOSIS — R51.9 INTRACTABLE HEADACHE, UNSPECIFIED CHRONICITY PATTERN, UNSPECIFIED HEADACHE TYPE: ICD-10-CM

## 2024-11-22 DIAGNOSIS — R42 DIZZINESS: Primary | ICD-10-CM
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
## 2025-01-31 DIAGNOSIS — M54.2 CERVICALGIA: Primary | ICD-10-CM

## 2025-02-13 ENCOUNTER — HOSPITAL ENCOUNTER (OUTPATIENT)
Dept: RADIOLOGY | Facility: HOSPITAL | Age: 60
Discharge: HOME | End: 2025-02-13
Attending: NURSE PRACTITIONER
Payer: MEDICARE

## 2025-02-13 DIAGNOSIS — M54.2 CERVICALGIA: ICD-10-CM

## 2025-02-13 PROCEDURE — 72141 MRI NECK SPINE W/O DYE: CPT | Mod: 26,,, | Performed by: RADIOLOGY

## 2025-02-13 PROCEDURE — 72141 MRI NECK SPINE W/O DYE: CPT | Mod: TC,PO

## 2025-03-05 ENCOUNTER — TELEPHONE (OUTPATIENT)
Dept: FAMILY MEDICINE | Facility: CLINIC | Age: 60
End: 2025-03-05
Payer: MEDICARE

## 2025-03-05 DIAGNOSIS — I10 PRIMARY HYPERTENSION: ICD-10-CM

## 2025-03-05 DIAGNOSIS — Z95.1 S/P CABG X 1: ICD-10-CM

## 2025-03-05 DIAGNOSIS — I25.10 CORONARY ARTERY DISEASE INVOLVING NATIVE CORONARY ARTERY OF NATIVE HEART WITHOUT ANGINA PECTORIS: ICD-10-CM

## 2025-03-05 RX ORDER — METOPROLOL SUCCINATE 25 MG/1
25 TABLET, EXTENDED RELEASE ORAL
Qty: 90 TABLET | Refills: 3 | Status: SHIPPED | OUTPATIENT
Start: 2025-03-05

## 2025-03-05 NOTE — TELEPHONE ENCOUNTER
Dr. Prieto is no longer accepting new patients.  Call placed to patient's wife (Pretty) for notification.  Advised which provider's are accepting new patients.  Mrs. Olsen agreed to appointment with MAXINE Crawley.  Appointment scheduled using new patient appointment type, and auto search feature to secure appropriate date, time, and length of appointment.   Mrs. Olsen agreed to appointment date, time, and location.

## 2025-03-05 NOTE — TELEPHONE ENCOUNTER
Zakia Larkin David Staff     ----- Message from Zakia sent at 3/3/2025  4:12 PM CST -----  Type: Appointment Request   Caller is requesting appointment.     Name of Caller:Pt Wife   When is the first available appointment?NA   Symptoms:Na   Would the patient rather a call back or a response via [facility]? Call back   Best Call Back Number:[phone]     Additional Information: Pt would like to get an appt as a new pt primary care I didn't see any dates available      Please call Back to advise. Thanks!

## 2025-04-01 ENCOUNTER — RESULTS FOLLOW-UP (OUTPATIENT)
Dept: FAMILY MEDICINE | Facility: CLINIC | Age: 60
End: 2025-04-01

## 2025-04-01 ENCOUNTER — HOSPITAL ENCOUNTER (OUTPATIENT)
Dept: RADIOLOGY | Facility: CLINIC | Age: 60
Discharge: HOME | End: 2025-04-01
Payer: MEDICARE

## 2025-04-01 ENCOUNTER — OFFICE VISIT (OUTPATIENT)
Dept: FAMILY MEDICINE | Facility: CLINIC | Age: 60
End: 2025-04-01
Payer: MEDICARE

## 2025-04-01 VITALS
WEIGHT: 200.19 LBS | HEIGHT: 69 IN | RESPIRATION RATE: 18 BRPM | SYSTOLIC BLOOD PRESSURE: 130 MMHG | HEART RATE: 62 BPM | OXYGEN SATURATION: 97 % | BODY MASS INDEX: 29.65 KG/M2 | DIASTOLIC BLOOD PRESSURE: 80 MMHG

## 2025-04-01 DIAGNOSIS — G47.33 OSA ON CPAP: ICD-10-CM

## 2025-04-01 DIAGNOSIS — Z95.1 S/P CABG X 1: ICD-10-CM

## 2025-04-01 DIAGNOSIS — I25.10 CORONARY ARTERY DISEASE INVOLVING NATIVE CORONARY ARTERY OF NATIVE HEART WITHOUT ANGINA PECTORIS: ICD-10-CM

## 2025-04-01 DIAGNOSIS — G89.29 CHRONIC MIDLINE THORACIC BACK PAIN: ICD-10-CM

## 2025-04-01 DIAGNOSIS — E78.00 HYPERCHOLESTEROLEMIA: ICD-10-CM

## 2025-04-01 DIAGNOSIS — Z12.11 COLON CANCER SCREENING: ICD-10-CM

## 2025-04-01 DIAGNOSIS — I10 PRIMARY HYPERTENSION: ICD-10-CM

## 2025-04-01 DIAGNOSIS — M54.6 CHRONIC MIDLINE THORACIC BACK PAIN: ICD-10-CM

## 2025-04-01 DIAGNOSIS — Z79.891 LONG TERM CURRENT USE OF OPIATE ANALGESIC: ICD-10-CM

## 2025-04-01 DIAGNOSIS — M54.2 CERVICALGIA: ICD-10-CM

## 2025-04-01 DIAGNOSIS — E66.3 OVERWEIGHT (BMI 25.0-29.9): ICD-10-CM

## 2025-04-01 DIAGNOSIS — Z76.89 ENCOUNTER TO ESTABLISH CARE: Primary | ICD-10-CM

## 2025-04-01 PROCEDURE — 72070 X-RAY EXAM THORAC SPINE 2VWS: CPT | Mod: 26,,, | Performed by: RADIOLOGY

## 2025-04-01 PROCEDURE — 99999 PR PBB SHADOW E&M-EST. PATIENT-LVL V: CPT | Mod: PBBFAC,,,

## 2025-04-01 PROCEDURE — 72070 X-RAY EXAM THORAC SPINE 2VWS: CPT | Mod: TC,FY,PO
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
## 2025-04-02 ENCOUNTER — TELEPHONE (OUTPATIENT)
Dept: FAMILY MEDICINE | Facility: CLINIC | Age: 60
End: 2025-04-02
Payer: MEDICARE

## 2025-04-02 NOTE — TELEPHONE ENCOUNTER
----- Message from Jacinta sent at 4/2/2025  4:21 PM CDT -----  Type:  Patient Returning CallWho Called:  ptWho Left Message for Patient:  Scott the patient know what this is regarding?:  resultsWould the patient rather a call back or a response via [facility]? callWould the Best Call Back Number:  [phone] Information:  pt didn't get all of message that was left

## 2025-04-04 ENCOUNTER — TELEPHONE (OUTPATIENT)
Dept: NEUROLOGY | Facility: CLINIC | Age: 60
End: 2025-04-04
Payer: MEDICARE

## 2025-04-04 NOTE — TELEPHONE ENCOUNTER
Spoke with patient regarding appointment scheduled for 04-07-25 at 1pm. Appointment location & date/time have been confirmed with the patient. No additional concerns voiced at this time.

## 2025-04-07 ENCOUNTER — OFFICE VISIT (OUTPATIENT)
Dept: NEUROLOGY | Facility: CLINIC | Age: 60
End: 2025-04-07
Payer: MEDICARE

## 2025-04-07 VITALS
WEIGHT: 200.19 LBS | DIASTOLIC BLOOD PRESSURE: 78 MMHG | HEIGHT: 69 IN | SYSTOLIC BLOOD PRESSURE: 126 MMHG | HEART RATE: 62 BPM | BODY MASS INDEX: 29.65 KG/M2

## 2025-04-07 DIAGNOSIS — F07.81 POST CONCUSSION SYNDROME: Primary | ICD-10-CM

## 2025-04-07 DIAGNOSIS — M54.81 OCCIPITAL NEURALGIA OF RIGHT SIDE: ICD-10-CM

## 2025-04-07 DIAGNOSIS — M79.12 MYALGIA OF AUXILIARY MUSCLES, HEAD AND NECK: ICD-10-CM

## 2025-04-07 DIAGNOSIS — R29.818 OTHER SYMPTOMS AND SIGNS INVOLVING THE NERVOUS SYSTEM: ICD-10-CM

## 2025-04-07 DIAGNOSIS — G47.33 OBSTRUCTIVE SLEEP APNEA SYNDROME: ICD-10-CM

## 2025-04-07 DIAGNOSIS — G44.329 CHRONIC POST-TRAUMATIC HEADACHE, NOT INTRACTABLE: ICD-10-CM

## 2025-04-07 PROCEDURE — 1160F RVW MEDS BY RX/DR IN RCRD: CPT | Mod: CPTII,S$GLB,, | Performed by: INTERNAL MEDICINE

## 2025-04-07 PROCEDURE — 3074F SYST BP LT 130 MM HG: CPT | Mod: CPTII,S$GLB,, | Performed by: INTERNAL MEDICINE

## 2025-04-07 PROCEDURE — 99999 PR PBB SHADOW E&M-EST. PATIENT-LVL V: CPT | Mod: PBBFAC,,, | Performed by: INTERNAL MEDICINE

## 2025-04-07 PROCEDURE — 3008F BODY MASS INDEX DOCD: CPT | Mod: CPTII,S$GLB,, | Performed by: INTERNAL MEDICINE

## 2025-04-07 PROCEDURE — 4010F ACE/ARB THERAPY RXD/TAKEN: CPT | Mod: CPTII,S$GLB,, | Performed by: INTERNAL MEDICINE

## 2025-04-07 PROCEDURE — 3078F DIAST BP <80 MM HG: CPT | Mod: CPTII,S$GLB,, | Performed by: INTERNAL MEDICINE

## 2025-04-07 PROCEDURE — 1159F MED LIST DOCD IN RCRD: CPT | Mod: CPTII,S$GLB,, | Performed by: INTERNAL MEDICINE

## 2025-04-07 PROCEDURE — 99204 OFFICE O/P NEW MOD 45 MIN: CPT | Mod: S$GLB,,, | Performed by: INTERNAL MEDICINE

## 2025-04-07 RX ORDER — DULOXETIN HYDROCHLORIDE 20 MG/1
20 CAPSULE, DELAYED RELEASE ORAL DAILY
Qty: 30 CAPSULE | Refills: 11 | Status: SHIPPED | OUTPATIENT
Start: 2025-04-07

## 2025-04-07 RX ORDER — RAMIPRIL 2.5 MG/1
2.5 CAPSULE ORAL NIGHTLY
Qty: 90 CAPSULE | Refills: 3 | Status: SHIPPED | OUTPATIENT
Start: 2025-04-07

## 2025-04-07 RX ORDER — ALPRAZOLAM 1 MG/1
1 TABLET ORAL 2 TIMES DAILY PRN
Qty: 2 TABLET | Refills: 0 | Status: SHIPPED | OUTPATIENT
Start: 2025-04-07 | End: 2025-05-07
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
## 2025-04-07 NOTE — PATIENT INSTRUCTIONS
-- Start PT for post-concussive syndrome  -- MRI Brain / MRA at [place]  -- Duloxetine 20 mg daily  -- Look up occipital nerve blocks / trigger point injection to occipitalis  -- Follow-up in 1 month

## 2025-04-15 ENCOUNTER — HOSPITAL ENCOUNTER (OUTPATIENT)
Dept: RADIOLOGY | Facility: HOSPITAL | Age: 60
Discharge: HOME | End: 2025-04-15
Attending: INTERNAL MEDICINE
Payer: MEDICARE

## 2025-04-15 ENCOUNTER — CLINICAL SUPPORT (OUTPATIENT)
Dept: REHABILITATION | Facility: HOSPITAL | Age: 60
End: 2025-04-15
Payer: MEDICARE

## 2025-04-15 ENCOUNTER — RESULTS FOLLOW-UP (OUTPATIENT)
Dept: NEUROLOGY | Facility: CLINIC | Age: 60
End: 2025-04-15

## 2025-04-15 DIAGNOSIS — R68.89 ACTIVITY INTOLERANCE: Primary | ICD-10-CM

## 2025-04-15 DIAGNOSIS — R29.898 DECREASED ROM OF NECK: ICD-10-CM

## 2025-04-15 DIAGNOSIS — F07.81 POST CONCUSSION SYNDROME: ICD-10-CM

## 2025-04-15 DIAGNOSIS — G44.329 CHRONIC POST-TRAUMATIC HEADACHE, NOT INTRACTABLE: ICD-10-CM

## 2025-04-15 DIAGNOSIS — R29.818 OTHER SYMPTOMS AND SIGNS INVOLVING THE NERVOUS SYSTEM: ICD-10-CM

## 2025-04-15 LAB
CREAT SERPL-MCNC: 1.1 MG/DL (ref 0.5–1.4)
SAMPLE: NORMAL

## 2025-04-15 PROCEDURE — 97162 PT EVAL MOD COMPLEX 30 MIN: CPT | Mod: PN

## 2025-04-15 PROCEDURE — 25500020 PHARM REV CODE 255: Mod: PO | Performed by: INTERNAL MEDICINE

## 2025-04-15 PROCEDURE — 97140 MANUAL THERAPY 1/> REGIONS: CPT | Mod: PN

## 2025-04-15 PROCEDURE — 70544 MR ANGIOGRAPHY HEAD W/O DYE: CPT | Mod: TC,PO

## 2025-04-15 PROCEDURE — 70553 MRI BRAIN STEM W/O & W/DYE: CPT | Mod: TC,PO

## 2025-04-15 PROCEDURE — A9585 GADOBUTROL INJECTION: HCPCS | Mod: PO | Performed by: INTERNAL MEDICINE

## 2025-04-15 RX ORDER — GADOBUTROL 604.72 MG/ML
9.5 INJECTION INTRAVENOUS
Status: DISCONTINUED | OUTPATIENT
Start: 2025-04-15 | End: 2025-04-15

## 2025-04-15 RX ORDER — GADOBUTROL 604.72 MG/ML
9.5 INJECTION INTRAVENOUS
Status: COMPLETED | OUTPATIENT
Start: 2025-04-15 | End: 2025-04-15

## 2025-04-15 RX ADMIN — GADOBUTROL 9.5 ML: 604.72 INJECTION INTRAVENOUS at 09:04

## 2025-04-16 ENCOUNTER — CLINICAL SUPPORT (OUTPATIENT)
Dept: REHABILITATION | Facility: HOSPITAL | Age: 60
End: 2025-04-16
Payer: MEDICARE

## 2025-04-16 DIAGNOSIS — G44.329 CHRONIC POST-TRAUMATIC HEADACHE, NOT INTRACTABLE: ICD-10-CM

## 2025-04-16 DIAGNOSIS — R29.898 DECREASED ROM OF NECK: ICD-10-CM

## 2025-04-16 DIAGNOSIS — R68.89 ACTIVITY INTOLERANCE: Primary | ICD-10-CM

## 2025-04-16 DIAGNOSIS — F07.81 POST CONCUSSION SYNDROME: ICD-10-CM

## 2025-04-16 PROCEDURE — 97110 THERAPEUTIC EXERCISES: CPT | Mod: PN,CQ

## 2025-04-16 PROCEDURE — 97140 MANUAL THERAPY 1/> REGIONS: CPT | Mod: PN,CQ
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
## 2025-04-16 NOTE — PROGRESS NOTES
"  Outpatient Rehab    Physical Therapy Visit    Patient Name: Jose Alberto Hager  MRN: [id number]  YOB: 1965  Encounter Date: 4/16/2025    Therapy Diagnosis:   Encounter Diagnoses   Name Primary?    Activity intolerance Yes    Decreased ROM of neck     Chronic post-traumatic headache, not intractable     Post concussion syndrome      Physician: Jermaine Cotton*    Physician Orders: Eval and Treat  Medical Diagnosis: Chronic post-traumatic headache, not intractable  Post concussion syndrome    Visit # / Visits Authorized: 1 / 20 (2 total)  Insurance Authorization Period: 4/16/2025 to 12/31/2025  Date of Evaluation: 4/15/2025  Plan of Care Certification: 4/15/2025 to 5/30/2025     PT/PTA: PTA   Number of PTA visits since last PT visit: 1  Time In: 1535   Time Out: 1628  Total Time: 53   Total Billable Time: 48 minutes    FOTO:  Intake Score: 32%  Survey Score 1:  %  Survey Score 2:  %    Precautions     Standard      Subjective   "I felt better after last time, but I barely slept like 3 hours last night. I have to get up and walk it off to make it feel a little better; I almost chucked." "It starts the headache when I turn to the right.".    "Mainly my back"    Objective            Treatment:  Therapeutic Exercise  TE 1: UBE lvl1.5 x6' (reversing midway)  TE 2: Seated Upper quadrant stretches 3x30s ea: UT, LS, SCM/SCM, Rhomboid; Cervical AROM 10x3s ea: flexion/extension, sidebend/lateral flexion, rotation, diagonals  TE 3: Supine chin tucks 10x3s  TE 4: *Reviewed self ulnar nerve glide after performing manual nerve glide*  Manual Therapy  MT 1: Myofascial release & Gentle strumming to B UT, LS, SCM, Scalene, deep neck flexors, [place]  MT 2: *Reviewed ear pulls*  MT 3: Subboccipital release  MT 4: Cervical traction  MT 5: Gentle cervical PROM with end range stretching      Time Entry(in minutes):  Manual Therapy Time Entry: 10  Therapeutic Exercise Time Entry: 43    Assessment & Plan "   Assessment: The patient reported to physical therapy stating minimal sleep due to dull headache; patient reports main compliant of back co-morbidity. Patient states increased/beginning symptoms when turning to the right, consisting of dull headache into right temple. Jose Alberto Hager was instructed in initial treatment program targeting increased flexibility to aide in decreased stresses on spine/head, improving functional mobility, and decreasing excess tension, as well as headaches and discomfort, in turn improving quality of life. Patient required moderate cues for technique and to reduce intensity in order to not agitate symptoms. Excess tension more so in right scalene, sternocleidomastoid, and deep neck flexors, as well as left upper trapezius. Patient deferred modalities post, stating he would utilize thermal modalities once returning home.  Evaluation/Treatment Tolerance: Other (Comment)    Patient will continue to benefit from skilled outpatient physical therapy to address the deficits listed in the problem list box on initial evaluation, provide pt/family education and to maximize pt's level of independence in the home and community environment.     Patient's spiritual, cultural, and educational needs considered and patient agreeable to plan of care and goals.     Education  Education was done with Patient. The patient's learning style includes Demonstration and Listening. The patient Demonstrates understanding and Verbalizes understanding.         The patient was instructed in initial treatment program as stated with reasoning; reviewed rehabilitation process. Advised patient to utilize cold thermal modalities if experiencing nausea with headaches; however, if not could utilize either cold or hot thermal modalities as needed for symptom management. Discussed possibilities of utilizing modalities (hot pack, cold pack, ESTIM) and dry-needling with PT as needed/seen fit.       Plan: POC: 2 times Per Week  for 6 Weeks. Therapeutic exercise, Therapeutic activities, Manual therapy, ADLs/IADLs, Dry needling, Cryotherapy (cold pack), Electrical stimulation - passive/unattended, and Thermotherapy (hot pack). Jose Alberto may be treated by PTA as part of their rehab team.  The patient is to be progressed within the established plan of care as tolerated in order to accomplish stated goals. Plan to gradually incorporate theraband/tube postural stability training as tolerated. Provide formal home exercise program in subsequent session.    Goals:   Active       Long Term Goals       Facilitate improved upper quadrant flexibility (Progressing)       Start:  04/15/25    Expected End:  05/30/25            Patient will resume his prior sleep pattern without disruption by neck pain (Ongoing)       Start:  04/15/25    Expected End:  05/30/25            Patient will consistently perform his self care activities without restriction by neck pain/headaches (Ongoing)       Start:  04/15/25    Expected End:  05/30/25            Patient will consistently reach overhead without increased neck pain/headaches (Ongoing)       Start:  04/15/25    Expected End:  05/30/25            Patient will consistently drive without increased neck pain/headaches (Ongoing)       Start:  04/15/25    Expected End:  05/30/25               Long Term Goals continued       Improve functional score to > 60% as indicated by FOTO neck survey (Ongoing)       Start:  04/15/25    Expected End:  05/30/25            Patient will be independent in HEP (Ongoing)       Start:  04/15/25    Expected End:  05/30/25               Short Term Goals       Decrease maximal neck pain/headaches to < 6/10 (Ongoing)       Start:  04/15/25    Expected End:  05/09/25            Decrease frequency of headaches to < daily (Ongoing)       Start:  04/15/25    Expected End:  05/09/25            Decrease hypersensitivity to moderate (Progressing)       Start:  04/15/25    Expected End:  05/09/25             Facilitate improved posture (Progressing)       Start:  04/15/25    Expected End:  05/09/25            Patient will look to the R with no more than minimal increase in discomfort (Ongoing)       Start:  04/15/25    Expected End:  05/09/25                Jenni Self, PTA

## 2025-04-22 ENCOUNTER — OFFICE VISIT (OUTPATIENT)
Dept: PAIN MEDICINE | Facility: CLINIC | Age: 60
End: 2025-04-22
Payer: MEDICARE

## 2025-04-22 ENCOUNTER — CLINICAL SUPPORT (OUTPATIENT)
Dept: REHABILITATION | Facility: HOSPITAL | Age: 60
End: 2025-04-22
Payer: MEDICARE

## 2025-04-22 VITALS
SYSTOLIC BLOOD PRESSURE: 121 MMHG | DIASTOLIC BLOOD PRESSURE: 78 MMHG | BODY MASS INDEX: 29.65 KG/M2 | HEIGHT: 69 IN | RESPIRATION RATE: 16 BRPM | HEART RATE: 59 BPM | WEIGHT: 200.19 LBS

## 2025-04-22 DIAGNOSIS — M54.6 CHRONIC MIDLINE THORACIC BACK PAIN: Primary | ICD-10-CM

## 2025-04-22 DIAGNOSIS — R29.898 DECREASED ROM OF NECK: ICD-10-CM

## 2025-04-22 DIAGNOSIS — M79.18 MYOFASCIAL PAIN: ICD-10-CM

## 2025-04-22 DIAGNOSIS — F07.81 POST CONCUSSION SYNDROME: ICD-10-CM

## 2025-04-22 DIAGNOSIS — G89.29 CHRONIC MIDLINE THORACIC BACK PAIN: Primary | ICD-10-CM

## 2025-04-22 DIAGNOSIS — M54.12 CERVICAL RADICULOPATHY: ICD-10-CM

## 2025-04-22 DIAGNOSIS — M54.2 CERVICALGIA: ICD-10-CM

## 2025-04-22 DIAGNOSIS — G44.329 CHRONIC POST-TRAUMATIC HEADACHE, NOT INTRACTABLE: ICD-10-CM

## 2025-04-22 DIAGNOSIS — F11.90 CHRONIC, CONTINUOUS USE OF OPIOIDS: ICD-10-CM

## 2025-04-22 DIAGNOSIS — R68.89 ACTIVITY INTOLERANCE: Primary | ICD-10-CM

## 2025-04-22 PROCEDURE — 97140 MANUAL THERAPY 1/> REGIONS: CPT | Mod: PN,CQ

## 2025-04-22 PROCEDURE — 3078F DIAST BP <80 MM HG: CPT | Mod: CPTII,S$GLB,, | Performed by: STUDENT IN AN ORGANIZED HEALTH CARE EDUCATION/TRAINING PROGRAM

## 2025-04-22 PROCEDURE — 3074F SYST BP LT 130 MM HG: CPT | Mod: CPTII,S$GLB,, | Performed by: STUDENT IN AN ORGANIZED HEALTH CARE EDUCATION/TRAINING PROGRAM

## 2025-04-22 PROCEDURE — 99999 PR PBB SHADOW E&M-EST. PATIENT-LVL IV: CPT | Mod: PBBFAC,,, | Performed by: STUDENT IN AN ORGANIZED HEALTH CARE EDUCATION/TRAINING PROGRAM

## 2025-04-22 PROCEDURE — 1159F MED LIST DOCD IN RCRD: CPT | Mod: CPTII,S$GLB,, | Performed by: STUDENT IN AN ORGANIZED HEALTH CARE EDUCATION/TRAINING PROGRAM

## 2025-04-22 PROCEDURE — 97110 THERAPEUTIC EXERCISES: CPT | Mod: PN,CQ

## 2025-04-22 PROCEDURE — 3008F BODY MASS INDEX DOCD: CPT | Mod: CPTII,S$GLB,, | Performed by: STUDENT IN AN ORGANIZED HEALTH CARE EDUCATION/TRAINING PROGRAM

## 2025-04-22 PROCEDURE — 4010F ACE/ARB THERAPY RXD/TAKEN: CPT | Mod: CPTII,S$GLB,, | Performed by: STUDENT IN AN ORGANIZED HEALTH CARE EDUCATION/TRAINING PROGRAM

## 2025-04-22 PROCEDURE — 99204 OFFICE O/P NEW MOD 45 MIN: CPT | Mod: S$GLB,,, | Performed by: STUDENT IN AN ORGANIZED HEALTH CARE EDUCATION/TRAINING PROGRAM

## 2025-04-22 RX ORDER — MECLIZINE HYDROCHLORIDE 25 MG/1
25 TABLET ORAL 3 TIMES DAILY PRN
COMMUNITY
Start: 2024-11-22

## 2025-04-22 RX ORDER — TIZANIDINE 4 MG/1
4 TABLET ORAL NIGHTLY
COMMUNITY
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
## 2025-04-22 NOTE — PROGRESS NOTES
"    Outpatient Rehab    Physical Therapy Visit    Patient Name: Jose Alberto Hager  MRN: [id number]  YOB: 1965  Encounter Date: 4/22/2025    Therapy Diagnosis:   Encounter Diagnoses   Name Primary?    Activity intolerance Yes    Decreased ROM of neck     Chronic post-traumatic headache, not intractable     Post concussion syndrome      Physician: Jermaine Cotton*    Physician Orders: Eval and Treat  Medical Diagnosis: Chronic post-traumatic headache, not intractable  Post concussion syndrome    Visit # / Visits Authorized: 2 / 20 (2 total)  Insurance Authorization Period: 4/16/2025 to 12/31/2025  Date of Evaluation: 4/15/2025  Plan of Care Certification: 4/15/2025 to 5/30/2025     PT/PTA: PTA   Number of PTA visits since last PT visit: 2  Time In: 1507   Time Out: 1615  Total Time: 68   Total Billable Time: 60 minutes due to overlapping w/another pt    FOTO:  Intake Score: 32%  Survey Score 1:  %  Survey Score 2:  %    Precautions     Standard      Subjective   "I am still having headaches, but I can turn a little better." Post previous: "Not too bad actually. I iced it afterwards, and that seemed to help.".  Pain reported as 7/10. R UT & LS    Objective            Treatment:  Therapeutic Exercise  TE 1: UBE lvl2.5 x6' (reversing midway)  TE 2: Seated Upper quadrant stretches 3x30s ea: UT, LS, SCM/SCM, Rhomboid; Cervical AROM 10x3s ea: flexion/extension, sidebend/lateral flexion, rotation, diagonals; Middle trapezius stretch 5x10s on R, Towel stretch into cervical rotation 5x10s ea, SNAGS in cervical extension 10x5s ea  TE 3: Supine chin tucks 15x3s  Manual Therapy  MT 1: Myofascial release & Gentle strumming to B UT, LS, SCM, Scalene, deep neck flexors  MT 2: Strumming, Trigger-point release, & Myofascial release to distal R UT, LS (superior to scapula in L sidelying)  MT 3: Subboccipital release      Time Entry(in minutes):  Manual Therapy Time Entry: 12  Therapeutic Exercise Time Entry: " 56    Assessment & Plan   Assessment: The patient reported to physical therapy stating slightly decreased symptoms and improved range; however, continued pain and headaches persist per report. Intermittent radicular symptoms in right upper extremity during activities reported. Jose Alberto Hager was progressed slightly with increased flexibility training to aide in decreasing stress on spine and in turn reducing tension on nerve, as well as improving increased functional mobility for performance and safety. Gradually reducing tension noted.  Evaluation/Treatment Tolerance: Patient tolerated treatment well    Patient will continue to benefit from skilled outpatient physical therapy to address the deficits listed in the problem list box on initial evaluation, provide pt/family education and to maximize pt's level of independence in the home and community environment.     Patient's spiritual, cultural, and educational needs considered and patient agreeable to plan of care and goals.     Education  Education was done with Patient. The patient's learning style includes Demonstration, Listening, and Tactile. The patient Demonstrates understanding and Verbalizes understanding.         The patient was instructed in progressions as noted with slight cues for technique. Reviewed upcoming progressions to include additional postural stability training.       Plan: POC: 2 times Per Week for 6 Weeks. Therapeutic exercise, Therapeutic activities, Manual therapy, ADLs/IADLs, Dry needling, Cryotherapy (cold pack), Electrical stimulation - passive/unattended, and Thermotherapy (hot pack). Jose Alberto may be treated by PTA as part of their rehab team.  The patient is to be progressed within the established plan of care as tolerated in order to accomplish stated goals. Plan to gradually incorporate theraband/tube postural stability training and stick-em ups as tolerated. Update home exercise program as further progressed (towel stretch, SNAG  in extension as more independent, & stability training as incorporated).    Goals:   Active       Long Term Goals       Facilitate improved upper quadrant flexibility (Progressing)       Start:  04/15/25    Expected End:  05/30/25            Patient will resume his prior sleep pattern without disruption by neck pain (Ongoing)       Start:  04/15/25    Expected End:  05/30/25            Patient will consistently perform his self care activities without restriction by neck pain/headaches (Ongoing)       Start:  04/15/25    Expected End:  05/30/25            Patient will consistently reach overhead without increased neck pain/headaches (Ongoing)       Start:  04/15/25    Expected End:  05/30/25            Patient will consistently drive without increased neck pain/headaches (Ongoing)       Start:  04/15/25    Expected End:  05/30/25               Long Term Goals continued       Improve functional score to > 60% as indicated by FOTO neck survey (Ongoing)       Start:  04/15/25    Expected End:  05/30/25            Patient will be independent in HEP (Progressing)       Start:  04/15/25    Expected End:  05/30/25               Short Term Goals       Decrease maximal neck pain/headaches to < 6/10 (Ongoing)       Start:  04/15/25    Expected End:  05/09/25            Decrease frequency of headaches to < daily (Ongoing)       Start:  04/15/25    Expected End:  05/09/25            Decrease hypersensitivity to moderate (Progressing)       Start:  04/15/25    Expected End:  05/09/25            Facilitate improved posture (Progressing)       Start:  04/15/25    Expected End:  05/09/25            Patient will look to the R with no more than minimal increase in discomfort (Progressing)       Start:  04/15/25    Expected End:  05/09/25                Jenni Self, CAM

## 2025-04-25 ENCOUNTER — CLINICAL SUPPORT (OUTPATIENT)
Dept: REHABILITATION | Facility: HOSPITAL | Age: 60
End: 2025-04-25
Payer: MEDICARE

## 2025-04-25 DIAGNOSIS — G44.329 CHRONIC POST-TRAUMATIC HEADACHE, NOT INTRACTABLE: ICD-10-CM

## 2025-04-25 DIAGNOSIS — R68.89 ACTIVITY INTOLERANCE: Primary | ICD-10-CM

## 2025-04-25 DIAGNOSIS — R29.898 DECREASED ROM OF NECK: ICD-10-CM

## 2025-04-25 PROCEDURE — 97140 MANUAL THERAPY 1/> REGIONS: CPT | Mod: PN,CQ

## 2025-04-25 PROCEDURE — 97110 THERAPEUTIC EXERCISES: CPT | Mod: PN,CQ

## 2025-04-25 NOTE — PROGRESS NOTES
"    Outpatient Rehab    Physical Therapy Visit    Patient Name: Jose Alberto Hager  MRN: [id number]  YOB: 1965  Encounter Date: 4/25/2025    Therapy Diagnosis:   Encounter Diagnoses   Name Primary?    Activity intolerance Yes    Decreased ROM of neck     Chronic post-traumatic headache, not intractable      Physician: Jermaine Cotton*    Physician Orders: Eval and Treat  Medical Diagnosis: Chronic post-traumatic headache, not intractable  Post concussion syndrome    Visit # / Visits Authorized: 3 / 20 (4 total)  Insurance Authorization Period: 4/16/2025 to 12/31/2025  Date of Evaluation: 4/15/2025  Plan of Care Certification: 4/15/2025 to 5/30/2025     PT/PTA: PTA   Number of PTA visits since last PT visit: 3  Time In: 1429   Time Out: 1536  Total Time: 67   Total Billable Time: 66 minutes    FOTO:  Intake Score: 32%  Survey Score 1:  %  Survey Score 2:  %    Precautions     Standard      Subjective   "I'm going to make it. I had a migrane yesterday; I woke up with it. I was okay that night, but I had been having dull headaches. It went from that to that migrane, and that's the worst I have had. It went down into that shoulder yesterday.".  Pain reported as 6/10. R neck and low back    Objective            Treatment:  Therapeutic Exercise  TE 1: UBE lvl3.5 x8' (reversing midway)  TE 2: Seated Upper quadrant stretches 3x30s ea: UT, LS, SCM/SCM, Rhomboid, Middle trapezius; Towel stretch into cervical rotation 10x5s ea, SNAGS in cervical extension 10x5s ea  TE 3: Stick-em ups 2x30s; Blue Theratube stability training x15 ea: Rows w/scapular retractions, B shoulder extension w/scapular retractions, B shoulder ER w/scapular retractions  TE 4: Supine chin tucks 20x3s  Manual Therapy  MT 1: Myofascial release & Gentle strumming to B UT, LS, SCM, Scalene, deep neck flexors  MT 2: Strumming, Trigger-point release, & Myofascial release to distal R UT, LS (superior to scapula in L sidelying)  MT 3: " Subboccipital release  MT 4: Cervical traction  MT 5: Gentle cervical PROM with end range stretching      Time Entry(in minutes):  Manual Therapy Time Entry: 15  Therapeutic Exercise Time Entry: 51    Assessment & Plan   Assessment: The patient reported to physical therapy stating moderate right sided neck pain into superior-scapula; patient reports intermittent radicular symptoms, however decreased. Patient also reports yesterday he experienced migrane instead of dull headache. Jose Alberto Loveless was progressed with increased stability training to aide in improved form, reduced stress on spine and nerves, postural endurance, and symptoms/quality of life. Excess tension gradually reducing; sensetivity continued over trigger-points (gradually reducing as well). Discomfort noted with cervical rotation to right in conjunction with extension (diagonal).  Evaluation/Treatment Tolerance: Patient tolerated treatment well    Patient will continue to benefit from skilled outpatient physical therapy to address the deficits listed in the problem list box on initial evaluation, provide pt/family education and to maximize pt's level of independence in the home and community environment.     Patient's spiritual, cultural, and educational needs considered and patient agreeable to plan of care and goals.     Education  Education was done with Patient. The patient's learning style includes Demonstration, Listening, and Tactile. The patient Demonstrates understanding and Verbalizes understanding.         The patient was instructed in stability progressions as noted with cues for technique. Advise patient to utilize thermal modalities as needed.       Plan: POC: 2 times Per Week for 6 Weeks. Therapeutic exercise, Therapeutic activities, Manual therapy, ADLs/IADLs, Dry needling, Cryotherapy (cold pack), Electrical stimulation - passive/unattended, and Thermotherapy (hot pack). Jose Alberto may be treated by PTA as part of their rehab  team.  The patient is to be progressed within the established plan of care as tolerated in order to accomplish stated goals. Plan to incorporate additional stability training as tolerated (B horizontal abduction w/scapular retractions, lat pulls, and banded wall walks). Update home exercise program as further progressed.    Goals:   Active       Long Term Goals       Facilitate improved upper quadrant flexibility (Progressing)       Start:  04/15/25    Expected End:  05/30/25            Patient will resume his prior sleep pattern without disruption by neck pain (Ongoing)       Start:  04/15/25    Expected End:  05/30/25            Patient will consistently perform his self care activities without restriction by neck pain/headaches (Progressing)       Start:  04/15/25    Expected End:  05/30/25            Patient will consistently reach overhead without increased neck pain/headaches (Ongoing)       Start:  04/15/25    Expected End:  05/30/25            Patient will consistently drive without increased neck pain/headaches (Ongoing)       Start:  04/15/25    Expected End:  05/30/25               Long Term Goals continued       Improve functional score to > 60% as indicated by FOTO neck survey (Ongoing)       Start:  04/15/25    Expected End:  05/30/25            Patient will be independent in HEP (Progressing)       Start:  04/15/25    Expected End:  05/30/25               Short Term Goals       Decrease maximal neck pain/headaches to < 6/10 (Progressing)       Start:  04/15/25    Expected End:  05/09/25            Decrease frequency of headaches to < daily (Ongoing)       Start:  04/15/25    Expected End:  05/09/25            Decrease hypersensitivity to moderate (Progressing)       Start:  04/15/25    Expected End:  05/09/25            Facilitate improved posture (Progressing)       Start:  04/15/25    Expected End:  05/09/25            Patient will look to the R with no more than minimal increase in discomfort  (Progressing)       Start:  04/15/25    Expected End:  05/09/25                Jenni Self, CAM

## 2025-04-28 ENCOUNTER — TELEPHONE (OUTPATIENT)
Dept: FAMILY MEDICINE | Facility: CLINIC | Age: 60
End: 2025-04-28
Payer: MEDICARE

## 2025-04-28 NOTE — TELEPHONE ENCOUNTER
Call placed to patient. No answer received. Left voicemail requesting return call to office.   Voicemail included date and time voicemail was left for patient.          Laurence Junior Staff     ----- Message from Laurence sent at 4/28/2025 12:31 PM CDT -----  Contact: pt  Type: Needs Medical Advice   Who Called:pt  Best Call Back Number:[phone]    Additional Information: Requesting a call back regarding  pt returned office call about results and asking for a call back please. Please Advise- Thank you

## 2025-05-05 ENCOUNTER — CLINICAL SUPPORT (OUTPATIENT)
Dept: REHABILITATION | Facility: HOSPITAL | Age: 60
End: 2025-05-05
Attending: INTERNAL MEDICINE
Payer: MEDICARE

## 2025-05-05 DIAGNOSIS — R29.898 DECREASED ROM OF NECK: ICD-10-CM

## 2025-05-05 DIAGNOSIS — G44.329 CHRONIC POST-TRAUMATIC HEADACHE, NOT INTRACTABLE: ICD-10-CM

## 2025-05-05 DIAGNOSIS — R68.89 ACTIVITY INTOLERANCE: Primary | ICD-10-CM

## 2025-05-05 PROCEDURE — 97140 MANUAL THERAPY 1/> REGIONS: CPT | Mod: PN

## 2025-05-05 PROCEDURE — 97110 THERAPEUTIC EXERCISES: CPT | Mod: PN
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
## 2025-05-09 ENCOUNTER — CLINICAL SUPPORT (OUTPATIENT)
Dept: REHABILITATION | Facility: HOSPITAL | Age: 60
End: 2025-05-09
Payer: MEDICARE

## 2025-05-09 DIAGNOSIS — R29.898 DECREASED ROM OF NECK: ICD-10-CM

## 2025-05-09 DIAGNOSIS — G44.329 CHRONIC POST-TRAUMATIC HEADACHE, NOT INTRACTABLE: ICD-10-CM

## 2025-05-09 DIAGNOSIS — R68.89 ACTIVITY INTOLERANCE: Primary | ICD-10-CM

## 2025-05-09 PROCEDURE — 97140 MANUAL THERAPY 1/> REGIONS: CPT | Mod: PN,CQ

## 2025-05-09 PROCEDURE — 97110 THERAPEUTIC EXERCISES: CPT | Mod: PN,CQ

## 2025-05-09 NOTE — PROGRESS NOTES
"    Outpatient Rehab    Physical Therapy Visit    Patient Name: Jose Alberto Hager  MRN: [id number]  YOB: 1965  Encounter Date: 5/9/2025    Therapy Diagnosis:   Encounter Diagnoses   Name Primary?    Activity intolerance Yes    Decreased ROM of neck     Chronic post-traumatic headache, not intractable      Physician: Jermaine Cotton*    Physician Orders: Eval and Treat  Medical Diagnosis: Chronic post-traumatic headache, not intractable  Post concussion syndrome    Visit # / Visits Authorized: 5 / 20 (6 total)  Insurance Authorization Period: 4/16/2025 to 12/31/2025  Date of Evaluation: 4/15/2025  Plan of Care Certification: 4/15/2025 to 5/30/2025     PT/PTA: PTA   Number of PTA visits since last PT visit: 1  Time In: 1233   Time Out: 1344  Total Time: 71   Total Billable Time: 49 minutes due to overlapping w/another pt    FOTO:  Intake Score: 32%  Survey Score 1:  %  Survey Score 2:  %    Precautions     Standard      Subjective   "I don't like to complain, but I barely got 3 hours of sleep last night. It's that headache in the right side of my head, and it's burning on that right side of my neck." Post previous: "I felt a little better.".  Pain reported as 8/10. R sided headache & burning in R UT    Objective            Treatment:  Therapeutic Exercise  TE 1: UBE lvl3.5 x8' (reversing midway)  TE 2: Seated Upper quadrant stretches 3x30s ea: UT, LS, SCM/SCM, Rhomboid, Middle trapezius; Towel stretch into cervical rotation 10x5s ea, SNAGS in cervical extension 10x5s ea  TE 3: Standing doorway stretch 3x30s; Stick-em ups 3x30s; Blue Theratube stability training x20 ea: Rows w/scapular retractions, B shoulder extension w/scapular retractions, Ws, B shoulder ER w/scapular retractions, B shoulder horizontal abduction w/scapular retractions, B latissimus pull downs; Banded wall walks using green theraband x5 bouts  TE 4: Supine chin tucks 20x3s  Manual Therapy  MT 1: Strumming, Trigger-point " release, and Myofascial release to B UT, LS, SCM, as well as R Scalene, deep neck flexors  MT 2: Suboccipital release  MT 3: Manual cervical traction  MT 4: Cervical PROM with end range stretching & Grade I-II gapping to R C4-6  MT 5: Strumming to temporomandibular region on R  MT 6: Ear pulls      Time Entry(in minutes):  Manual Therapy Time Entry: 12  Therapeutic Exercise Time Entry: 58    Assessment & Plan   Assessment: The patient reported to physical therapy stating present right upper trapezius tension and burning, as well as right sided headache. Patient reports MD is considering injections; patient not sure if it is steriod injections or botox, reviewed the difference between the two. Jose Alberto Hager was instructed in postural stability progressions to aide in improved form, reduced tension on spine/improved spinal support, and reduced chance of future/further injury. Patient demonstrates excess tension in right upper trapezius, levator scapulae, deep neck flexors, scalene, and bilateral sternocleidomastoid, reducing somewhat post manual techniques. Tension on left reduced compared to initially.  Evaluation/Treatment Tolerance: Patient tolerated treatment well    Patient will continue to benefit from skilled outpatient physical therapy to address the deficits listed in the problem list box on initial evaluation, provide pt/family education and to maximize pt's level of independence in the home and community environment.     Patient's spiritual, cultural, and educational needs considered and patient agreeable to plan of care and goals.     Education  Education was done with Patient. The patient's learning style includes Demonstration and Listening. The patient Demonstrates understanding and Verbalizes understanding.         The patient was instructed in progressions as noted. Reviewed present anatomy and physiology in relation to condition, and importance of stretching as symptoms begin to arise. Also  reviewed self and or spouse provided upper trapezius and levator scapulae soft tissue mobilization using roller bar.       Plan: POC: 2 times Per Week for 6 Weeks. Therapeutic exercise, Therapeutic activities, Manual therapy, ADLs/IADLs, Dry needling, Cryotherapy (cold pack), Electrical stimulation - passive/unattended, and Thermotherapy (hot pack). Jose Alberto may be treated by PTA as part of their rehab team.  The patient is to be progressed within the established plan of care as tolerated in order to accomplish stated goals. Plan to re-incorporate TENS with thermal modalities as needed following manual techniques to aide in reducing muscle soreness and promoting increased muscle relaxation following treatment.    Goals:   Active       Long Term Goals       Facilitate improved upper quadrant flexibility (Progressing)       Start:  04/15/25    Expected End:  05/30/25            Patient will resume his prior sleep pattern without disruption by neck pain (Ongoing)       Start:  04/15/25    Expected End:  05/30/25            Patient will consistently perform his self care activities without restriction by neck pain/headaches (Progressing)       Start:  04/15/25    Expected End:  05/30/25            Patient will consistently reach overhead without increased neck pain/headaches (Progressing)       Start:  04/15/25    Expected End:  05/30/25            Patient will consistently drive without increased neck pain/headaches (Progressing)       Start:  04/15/25    Expected End:  05/30/25               Long Term Goals continued       Improve functional score to > 60% as indicated by FOTO neck survey (Ongoing)       Start:  04/15/25    Expected End:  05/30/25            Patient will be independent in HEP (Progressing)       Start:  04/15/25    Expected End:  05/30/25               Short Term Goals       Decrease maximal neck pain/headaches to < 6/10 (Ongoing)       Start:  04/15/25    Expected End:  05/09/25            Decrease  frequency of headaches to < daily (Ongoing)       Start:  04/15/25    Expected End:  05/09/25            Decrease hypersensitivity to moderate (Progressing)       Start:  04/15/25    Expected End:  05/09/25            Facilitate improved posture (Progressing)       Start:  04/15/25    Expected End:  05/09/25            Patient will look to the R with no more than minimal increase in discomfort (Progressing)       Start:  04/15/25    Expected End:  05/09/25                Jenni Self, PTA
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
## 2025-05-13 ENCOUNTER — OFFICE VISIT (OUTPATIENT)
Dept: NEUROLOGY | Facility: CLINIC | Age: 60
End: 2025-05-13
Payer: MEDICARE

## 2025-05-13 VITALS — DIASTOLIC BLOOD PRESSURE: 84 MMHG | SYSTOLIC BLOOD PRESSURE: 135 MMHG | RESPIRATION RATE: 16 BRPM | HEART RATE: 61 BPM

## 2025-05-13 DIAGNOSIS — M79.12 MYALGIA OF AUXILIARY MUSCLES, HEAD AND NECK: Primary | ICD-10-CM

## 2025-05-13 DIAGNOSIS — G44.329 CHRONIC POST-TRAUMATIC HEADACHE, NOT INTRACTABLE: ICD-10-CM

## 2025-05-13 DIAGNOSIS — F07.81 POST CONCUSSION SYNDROME: ICD-10-CM

## 2025-05-13 DIAGNOSIS — M54.81 OCCIPITAL NEURALGIA OF RIGHT SIDE: ICD-10-CM

## 2025-05-13 PROCEDURE — 99999 PR PBB SHADOW E&M-EST. PATIENT-LVL III: CPT | Mod: PBBFAC,,, | Performed by: INTERNAL MEDICINE

## 2025-05-13 RX ORDER — DULOXETIN HYDROCHLORIDE 30 MG/1
30 CAPSULE, DELAYED RELEASE ORAL DAILY
Qty: 30 CAPSULE | Refills: 2 | Status: SHIPPED | OUTPATIENT
Start: 2025-05-13

## 2025-05-13 NOTE — PATIENT INSTRUCTIONS
-- Increase duloxetine to 30 mg daily  -- Continue PT  -- schedule next trigger point injection in 2 weeks  -- 2 month

## 2025-05-13 NOTE — PROCEDURES
Trigger Point Injection    Performed by: Jermaine Cotton MD  Authorized by: Jermaine Cotton MD      Consent Done?:  Yes (Verbal)    Pre-Procedure:   Indications:  Pain  Site marked: the procedure site was marked     Timeout: prior to procedure the correct patient, procedure, and site was verified    Prep: patient was prepped and draped in usual sterile fashion     Local anesthesia used?: Yes    Anesthesia:  Local infiltration  Local anesthetic: ropi 0.5%  Anesthetic total (ml):  5      Description of Procedure:    The patient was identified and consented verbally to procedure(s) listed below. The patient acknowledged the risks of the procedure, which include (but not necessarily limited to) pain, bleeding, bruising, infection at the injection site, nerve injury / damage, lung injury (for trigger point injections and nerve blocks in the thoracic region), bowel injury (for trigger point injections and nerve blocks of the abdomen), local anesthetic toxicity, spinal cord and/or major vessel injury (for cervical procedures). The patient wished to proceed with the procedure.     They were then placed sitting with head down in the clinic room, the site was identified / marked, and the area was sterilized using alcohol.     Then, the following was performed:    Trigger point injection of the right medial occipitalis    Needle used: 30G, 1 in (2.5 cm) needle  Type and total local anesthetic used: 5 mL of ropivacaine 0.5% and 0 mL of lidocaine 1%   Type and total steroid used:  None    The site(s) was/were cleaned and the patient was instructed to avoid submersion in water x 48 hours. They were told to let the clinic know if there were concerns for infection (increased redness, heat, pain, swelling) in the area receiving the procedure.     Ultrasound was used for this procedure and image has been uploaded to the chart: No    Jermaine Cotton MD  Headache and Pain Management  [facility]  [place]

## 2025-05-14 ENCOUNTER — CLINICAL SUPPORT (OUTPATIENT)
Dept: REHABILITATION | Facility: HOSPITAL | Age: 60
End: 2025-05-14
Attending: INTERNAL MEDICINE
Payer: MEDICARE

## 2025-05-14 DIAGNOSIS — R68.89 ACTIVITY INTOLERANCE: Primary | ICD-10-CM

## 2025-05-14 DIAGNOSIS — G44.329 CHRONIC POST-TRAUMATIC HEADACHE, NOT INTRACTABLE: ICD-10-CM

## 2025-05-14 DIAGNOSIS — R29.898 DECREASED ROM OF NECK: ICD-10-CM

## 2025-05-14 PROCEDURE — 97110 THERAPEUTIC EXERCISES: CPT | Mod: PN

## 2025-05-14 PROCEDURE — 97140 MANUAL THERAPY 1/> REGIONS: CPT | Mod: PN

## 2025-05-14 NOTE — PROGRESS NOTES
"  Outpatient Rehab    Physical Therapy Visit    Patient Name: Jose Alberto Hager  MRN: [id number]  YOB: 1965  Encounter Date: 5/14/2025    Therapy Diagnosis:   Encounter Diagnoses   Name Primary?    Activity intolerance Yes    Decreased ROM of neck     Chronic post-traumatic headache, not intractable      Physician: Jermaine Cotton MD    Physician Orders: Eval and Treat  Medical Diagnosis: Chronic post-traumatic headache, not intractable  Post concussion syndrome    Visit # / Visits Authorized:  6 / 20  Insurance Authorization Period: 4/16/2025 to 12/31/2025  Date of Evaluation: 4/9/2025  Plan of Care Certification: 4/15/2025 to 5/30/2025      PT/PTA: PT   Number of PTA visits since last PT visit:   Time In: 1438   Time Out: 1536  Total Time (in minutes): 58   Total Billable Time (in minutes): 53    FOTO:  Intake Score: 31%  Survey Score 2: 41%  Survey Score 3:  %    Precautions:     Standard      Subjective   "I got injections in my neck yesterday. It was numb for about 6 hours..  Pain reported as 6/10. R sided headache and upper trapezius.    Objective          Treatment:  Therapeutic Exercise  TE 1: UBE lvl4 x8' (reversing midway)  TE 2: Seated Upper quadrant stretches 3x30s ea: UT, LS, SCM/SCM, Rhomboid, Middle trapezius; Towel stretch into cervical rotation 10x5s ea, SNAGS in cervical extension 10x5s ea  TE 3: Standing doorway stretch 3x30s; Stick-em ups 3x30s; Blue Theratube stability training x20 ea: Rows w/scapular retractions, B shoulder extension w/scapular retractions, Ws, B shoulder ER w/scapular retractions, B shoulder horizontal abduction w/scapular retractions, B latissimus pull downs; Banded wall walks using green theraband x5 bouts  TE 4: Supine chin tucks 10x3s  Manual Therapy  MT 1: Strumming, Trigger-point release, and Myofascial release to R UT, LS, R infraspinatus  MT 2: Suboccipital release  MT 3: Manual cervical traction  MT 4: Cervical PROM with end range " stretching & Grade I-II gapping to R C4-6 with cavitation achieved @ R C5-6    Time Entry(in minutes):  Manual Therapy Time Entry: 12  Therapeutic Exercise Time Entry: 41    Assessment & Plan   Assessment: Jose Alberto presents to PT today reporting moderate headache and R shoulder pain despite having received a steroid injection yesterday. He exhibits limited R C3-6 gapping but improved following joint mobilization.  Also exhibited significant trigger points in R levator scap, upper trap, and infraspinatus. These decreased minimally following release.  He would benefit from use of dry needling to facilitate trigger point release. He is considering this.  Evaluation/Treatment Tolerance: Patient tolerated treatment well    Patient will continue to benefit from skilled outpatient physical therapy to address the deficits listed in the problem list box on initial evaluation, provide pt/family education and to maximize pt's level of independence in the home and community environment.     Patient's spiritual, cultural, and educational needs considered and patient agreeable to plan of care and goals.     Education  Education was done with Patient. The patient's learning style includes Demonstration and Listening. The patient Demonstrates understanding and Verbalizes understanding.         Reviewed exercises with verbal and visual cues for technique. Discussed pros and cons of dry needling     Plan: The patient is to be progressed within the established plan of care as tolerated in order to accomplish stated goals. Plan to utilize dry needling, if patient agreeable, to assist with trigger point reduction. Incorporate modalities as needed.    Goals:   Active       Long Term Goals       Facilitate improved upper quadrant flexibility (Progressing)       Start:  04/15/25    Expected End:  05/30/25            Patient will resume his prior sleep pattern without disruption by neck pain (Ongoing)       Start:  04/15/25    Expected End:   05/30/25            Patient will consistently perform his self care activities without restriction by neck pain/headaches (Progressing)       Start:  04/15/25    Expected End:  05/30/25            Patient will consistently reach overhead without increased neck pain/headaches (Progressing)       Start:  04/15/25    Expected End:  05/30/25            Patient will consistently drive without increased neck pain/headaches (Progressing)       Start:  04/15/25    Expected End:  05/30/25               Long Term Goals continued       Improve functional score to > 60% as indicated by FOTO neck survey (Progressing)       Start:  04/15/25    Expected End:  05/30/25            Patient will be independent in HEP (Progressing)       Start:  04/15/25    Expected End:  05/30/25               Short Term Goals       Decrease maximal neck pain/headaches to < 6/10 (Progressing)       Start:  04/15/25    Expected End:  05/09/25            Decrease frequency of headaches to < daily (Progressing)       Start:  04/15/25    Expected End:  05/09/25            Decrease hypersensitivity to moderate (Met)       Start:  04/15/25    Expected End:  05/09/25    Resolved:  05/14/25         Facilitate improved posture (Progressing)       Start:  04/15/25    Expected End:  05/09/25            Patient will look to the R with no more than minimal increase in discomfort (Progressing)       Start:  04/15/25    Expected End:  05/09/25                Gabriella Solorio, PT

## 2025-05-19 ENCOUNTER — CLINICAL SUPPORT (OUTPATIENT)
Dept: REHABILITATION | Facility: HOSPITAL | Age: 60
End: 2025-05-19
Payer: MEDICARE

## 2025-05-19 DIAGNOSIS — G44.329 CHRONIC POST-TRAUMATIC HEADACHE, NOT INTRACTABLE: ICD-10-CM

## 2025-05-19 DIAGNOSIS — R29.898 DECREASED ROM OF NECK: ICD-10-CM

## 2025-05-19 DIAGNOSIS — R68.89 ACTIVITY INTOLERANCE: Primary | ICD-10-CM

## 2025-05-19 PROCEDURE — 97110 THERAPEUTIC EXERCISES: CPT | Mod: PN,CQ

## 2025-05-19 PROCEDURE — 97140 MANUAL THERAPY 1/> REGIONS: CPT | Mod: PN,CQ

## 2025-05-19 NOTE — PROGRESS NOTES
"  Outpatient Rehab    Physical Therapy Visit    Patient Name: Jose Alberto Hager  MRN: [id number]  YOB: 1965  Encounter Date: 5/19/2025    Therapy Diagnosis:   Encounter Diagnoses   Name Primary?    Activity intolerance Yes    Decreased ROM of neck     Chronic post-traumatic headache, not intractable      Physician: Jermaine Cotton*    Physician Orders: Eval and Treat  Medical Diagnosis: Chronic post-traumatic headache, not intractable  Post concussion syndrome    Visit # / Visits Authorized: 7 / 20 (8 total)  Insurance Authorization Period: 4/16/2025 to 12/31/2025  Date of Evaluation: 4/9/2025  Plan of Care Certification: 4/15/2025 to 5/30/2025      PT/PTA: PTA   Number of PTA visits since last PT visit: 1  Time In: 1432   Time Out: 1554  Total Time (in minutes): 82   Total Billable Time (in minutes): 80    FOTO:  Intake Score: 31%  Survey Score 2: 41%  Survey Score 3:  %    Precautions:     Standard      Subjective   "I am not doing good. Yesterday my neck was bad; today it is dull. It's mainly my back today; I would rather that hurt than my head." Post previous: "It felt better after last time, but I had to miss Friday because I wasn't doing good. I was having to stand a lot, and I think that had something to do with it, putting pressure on that right side. It has been bad all weekend.".  Pain reported as 8/10. R side headache (dull) from upper trapezius and upper back (R neck & upper back)    Objective            Treatment:  Therapeutic Exercise  TE 1: UBE lvl4 x8' (reversing midway)  TE 2: Seated Upper quadrant stretches 3x30s ea: UT, LS, SCM/SCM, Rhomboid, Middle trapezius; Towel stretch into cervical rotation 10x5s ea, SNAGS in cervical extension 10x5s ea; Thread the needle to the L (stretching R) 10x3s, Thoracic sidebend to L (stretching R) 10x3s, Thoracic extension 10x3s  TE 3: Standing doorway stretch 3x30s; Stick-em ups (cervical sidebend to L) 3x30s; Blue Theratube stability " training x20 ea: Rows w/scapular retractions, B shoulder extension w/scapular retractions, Ws (D/C due to RTC co-morbidity), B shoulder ER w/scapular retractions, B shoulder horizontal abduction w/scapular retractions, B latissimus pull downs; Banded wall walks using green theraband x5 bouts  TE 4: Supine chin tucks 15x3s  Manual Therapy  MT 1: Strumming, Trigger-point release, & Myfascial release to: B UT, LS, Scalene, SCM, & deep neck flexors; R rhomboids & thoracic paraspinals  MT 2: Suboccipital release  MT 3: Manual cervical traction  MT 4: Cervical PROM with end range stretching & Grade I-II gapping to R C4-6 with cavitation achieved @ R C5-6      Time Entry(in minutes):  Manual Therapy Time Entry: 15  Therapeutic Exercise Time Entry: 65    Assessment & Plan   Assessment: The patient reported to physical therapy stating dull headache and most pain right sided thoracic region. Patient believes this is where most of his symptoms are coming from. Jose Alberto Hager was progressed with increased flexibility training to target said symptoms, decreasing stress on spine and in turn nerves, as well as improving functional mobility and activity tolerance. Patient reported discomfort during Ws due to previous rotator cuff co-morbidity, so they were discontinued. Excess tension gradually reducing. Most tension noted in bilateral scalene and deep neck flexors, right sternocleidomastoid, middle trapezius, rhomboids, and thoracic paraspinals, as well as left upper trapezius. Discomfort noted on right during end range passive right cervical rotation in conjunction with extension; decreased discomfort noted if gapping.  Evaluation/Treatment Tolerance: Other (Comment)    Patient will continue to benefit from skilled outpatient physical therapy to address the deficits listed in the problem list box on initial evaluation, provide pt/family education and to maximize pt's level of independence in the home and community environment.      Patient's spiritual, cultural, and educational needs considered and patient agreeable to plan of care and goals.     Education  Education was done with Patient. The patient's learning style includes Demonstration and Listening. The patient Demonstrates understanding and Verbalizes understanding.         The patient was instructed in slight progressions with cues for technique. Education was provided on steroid injections, botox injections, and nerve ablations. Discussed self soft tissue mobilizations using hands, rolling pin, and tennis ball.       Plan: POC: 2 times Per Week for 6 Weeks. Therapeutic exercise, Therapeutic activities, Manual therapy, ADLs/IADLs, Dry needling, Cryotherapy (cold pack), Electrical stimulation - passive/unattended, and Thermotherapy (hot pack). Jose Alberto may be treated by PTA as part of their rehab team.  The patient is to be progressed within the established plan of care as tolerated in order to accomplish stated goals. Plan to utilize dry needling, if patient agreeable, to assist with trigger point reduction. Incorporate modalities as needed.    Goals:   Active       Long Term Goals       Facilitate improved upper quadrant flexibility (Progressing)       Start:  04/15/25    Expected End:  05/30/25            Patient will resume his prior sleep pattern without disruption by neck pain (Ongoing)       Start:  04/15/25    Expected End:  05/30/25            Patient will consistently perform his self care activities without restriction by neck pain/headaches (Progressing)       Start:  04/15/25    Expected End:  05/30/25            Patient will consistently reach overhead without increased neck pain/headaches (Progressing)       Start:  04/15/25    Expected End:  05/30/25            Patient will consistently drive without increased neck pain/headaches (Progressing)       Start:  04/15/25    Expected End:  05/30/25               Long Term Goals continued       Improve functional score to >  60% as indicated by FOTO neck survey (Progressing)       Start:  04/15/25    Expected End:  05/30/25            Patient will be independent in HEP (Progressing)       Start:  04/15/25    Expected End:  05/30/25               Short Term Goals       Decrease maximal neck pain/headaches to < 6/10 (Ongoing)       Start:  04/15/25    Expected End:  05/09/25            Decrease frequency of headaches to < daily (Ongoing)       Start:  04/15/25    Expected End:  05/09/25            Decrease hypersensitivity to moderate (Met)       Start:  04/15/25    Expected End:  05/09/25    Resolved:  05/14/25         Facilitate improved posture (Progressing)       Start:  04/15/25    Expected End:  05/09/25            Patient will look to the R with no more than minimal increase in discomfort (Progressing)       Start:  04/15/25    Expected End:  05/09/25                Jenni Self, PTA

## 2025-05-23 ENCOUNTER — CLINICAL SUPPORT (OUTPATIENT)
Dept: REHABILITATION | Facility: HOSPITAL | Age: 60
End: 2025-05-23
Attending: INTERNAL MEDICINE
Payer: MEDICARE

## 2025-05-23 DIAGNOSIS — G44.329 CHRONIC POST-TRAUMATIC HEADACHE, NOT INTRACTABLE: ICD-10-CM

## 2025-05-23 DIAGNOSIS — R29.898 DECREASED ROM OF NECK: ICD-10-CM

## 2025-05-23 DIAGNOSIS — R68.89 ACTIVITY INTOLERANCE: Primary | ICD-10-CM

## 2025-05-23 PROCEDURE — 97140 MANUAL THERAPY 1/> REGIONS: CPT | Mod: PN

## 2025-05-23 PROCEDURE — 97110 THERAPEUTIC EXERCISES: CPT | Mod: PN

## 2025-05-23 NOTE — PROGRESS NOTES
"  Outpatient Rehab    Physical Therapy Progress Note    Patient Name: Jose Alberto Hager  MRN: [id number]  YOB: 1965  Encounter Date: 5/23/2025    Therapy Diagnosis:   Encounter Diagnoses   Name Primary?    Activity intolerance Yes    Decreased ROM of neck     Chronic post-traumatic headache, not intractable      Physician: Jermaine Cotton*    Physician Orders: Eval and Treat  Medical Diagnosis: Chronic post-traumatic headache, not intractable  Post concussion syndrome    Visit # / Visits Authorized:  8 / 20  Insurance Authorization Period: 4/16/2025 to 12/31/2025  Date of Evaluation: 4/9/2025  Plan of Care Certification: 4/15/2025 to 5/30/2025      PT/PTA: PT   Number of PTA visits since last PT visit:   Time In: 1433   Time Out: 1535  Total Time (in minutes): 62   Total Billable Time (in minutes): 56    FOTO:  Intake Score: 31%  Survey Score 2: 41%  Survey Score 3:  %    Precautions:     Standard      Subjective   "My back is still a bit of a problem. The shoulder is better but the headache is pretty rough. Started this morning.".  Pain reported as 6/10. R sided headache    Objective      Subcranial Range of Motion   Active Restricted? Passive Restricted? Pain   Flexion         Protraction         Retraction           Cervical Range of Motion   Active (deg) Passive (deg) Pain   Flexion 47 57     Extension 39 45     Right Lateral Flexion 28 33     Right Rotation 53 60     Left Lateral Flexion 38 43     Left Rotation 65 75                  Treatment:  Therapeutic Exercise  TE 1: UBE lvl4 x8' (reversing midway)  TE 2: Seated Upper quadrant stretches 3x30s ea: UT, LS, SCM/SCM, Rhomboid, Middle trapezius; Towel stretch into cervical rotation 10x5s ea, SNAGS in cervical extension 10x5s ea; Thread the needle to the L (stretching R) 10x3s, Thoracic sidebend to each side 10x3s, Thoracic extension 10x3s  TE 3: Standing doorway stretch 3x30s; Stick-em ups (cervical sidebend to L) 3x30s; Blue " Theratube stability training x20 ea: Rows w/scapular retractions, B shoulder extension w/scapular retractions, B shoulder ER w/scapular retractions, B shoulder horizontal abduction w/scapular retractions, B latissimus pull downs; Banded wall walks using green theraband x5 bouts  TE 4: Supine chin tucks 20x3s  Manual Therapy  MT 1: Strumming, trigger point release to B upper trapezii, R levator scapula, B SCM  MT 2: Suboccipital release  MT 3: Manual cervical traction  MT 4: Cervical PROM with end range stretching & Grade I-II gapping to R C4-6 with cavitation achieved @ R C5-6  MT 6: Ear pulls    Time Entry(in minutes):  Manual Therapy Time Entry: 12  Therapeutic Exercise Time Entry: 44    Assessment & Plan   Assessment: Antony presents to PT today reporting moderate headache but less shoulder pain. He did well with exercises but required verbal and visual cues for technique.  He exhibited moderate trigger points in R levator scapula, B upper trapezii and minimal trigger points in B SCM.  Cavitation achieved with gapping @ R C5-6 with relief of headache reported following session.  Evaluation/Treatment Tolerance: Patient tolerated treatment well    The patient will continue to benefit from skilled outpatient physical therapy in order to address the deficits listed in the problem list on the initial evaluation, provide patient and family education, and maximize the patients level of independence in the home and community environments.     The patient's spiritual, cultural, and educational needs were considered, and the patient is agreeable to the plan of care and goals.     Education  Education was done with Patient. The patient's learning style includes Demonstration and Listening. The patient Demonstrates understanding and Verbalizes understanding.         Reviewed exercises as noted with verbal and visual cues for technique.       Plan: The patient is to be progressed within the established plan of care as tolerated  in order to accomplish stated goals. Plan to add resisted cervical side bend. Trial of electrical stimulation if patient continues to decline needling.    Goals:   Active       Long Term Goals       Facilitate improved upper quadrant flexibility (Progressing)       Start:  04/15/25    Expected End:  05/30/25            Patient will resume his prior sleep pattern without disruption by neck pain (Progressing)       Start:  04/15/25    Expected End:  05/30/25            Patient will consistently perform his self care activities without restriction by neck pain/headaches (Progressing)       Start:  04/15/25    Expected End:  05/30/25            Patient will consistently reach overhead without increased neck pain/headaches (Progressing)       Start:  04/15/25    Expected End:  05/30/25            Patient will consistently drive without increased neck pain/headaches (Progressing)       Start:  04/15/25    Expected End:  05/30/25               Long Term Goals continued       Improve functional score to > 60% as indicated by FOTO neck survey (Progressing)       Start:  04/15/25    Expected End:  05/30/25            Patient will be independent in HEP (Progressing)       Start:  04/15/25    Expected End:  05/30/25               Short Term Goals       Decrease maximal neck pain/headaches to < 6/10 (Progressing)       Start:  04/15/25    Expected End:  05/09/25            Decrease frequency of headaches to < daily (Ongoing)       Start:  04/15/25    Expected End:  05/09/25            Decrease hypersensitivity to moderate (Met)       Start:  04/15/25    Expected End:  05/09/25    Resolved:  05/14/25         Facilitate improved posture (Progressing)       Start:  04/15/25    Expected End:  05/09/25            Patient will look to the R with no more than minimal increase in discomfort (Progressing)       Start:  04/15/25    Expected End:  05/09/25                Gabriella Solorio, PT

## 2025-05-27 ENCOUNTER — TELEPHONE (OUTPATIENT)
Dept: PAIN MEDICINE | Facility: CLINIC | Age: 60
End: 2025-05-27
Payer: MEDICARE

## 2025-05-27 NOTE — TELEPHONE ENCOUNTER
----- Message from Latoya sent at 5/27/2025 11:03 AM CDT -----  Contact: PT  Type:  NABEEL  Apoointment RequestName of Caller:PT When is the first available appointment?N/ASymptoms:PROCEDURE Would the patient rather a call back or a response via [facility] [employer]? CALL Best Call Back Number:[phone] Information: NABEEL DUE TO WEATHER THANK YOU

## 2025-05-30 ENCOUNTER — CLINICAL SUPPORT (OUTPATIENT)
Dept: REHABILITATION | Facility: HOSPITAL | Age: 60
End: 2025-05-30
Payer: MEDICARE

## 2025-05-30 VITALS — SYSTOLIC BLOOD PRESSURE: 130 MMHG | OXYGEN SATURATION: 97 % | DIASTOLIC BLOOD PRESSURE: 76 MMHG

## 2025-05-30 DIAGNOSIS — R68.89 ACTIVITY INTOLERANCE: Primary | ICD-10-CM

## 2025-05-30 DIAGNOSIS — G44.329 CHRONIC POST-TRAUMATIC HEADACHE, NOT INTRACTABLE: ICD-10-CM

## 2025-05-30 DIAGNOSIS — R29.898 DECREASED ROM OF NECK: ICD-10-CM

## 2025-05-30 PROCEDURE — 97110 THERAPEUTIC EXERCISES: CPT | Mod: PN,CQ

## 2025-05-30 PROCEDURE — 97140 MANUAL THERAPY 1/> REGIONS: CPT | Mod: PN,CQ

## 2025-05-30 NOTE — PROGRESS NOTES
"  Outpatient Rehab    Physical Therapy Visit    Patient Name: Jose Alberto Hager  MRN: [id number]  YOB: 1965  Encounter Date: 5/30/2025    Therapy Diagnosis:   Encounter Diagnoses   Name Primary?    Activity intolerance Yes    Decreased ROM of neck     Chronic post-traumatic headache, not intractable      Physician: Jermaine Cotton*    Physician Orders: Eval and Treat  Medical Diagnosis: Chronic post-traumatic headache, not intractable  Post concussion syndrome    Visit # / Visits Authorized: 9 / 20 (10 total)  Insurance Authorization Period: 4/16/2025 to 12/31/2025  Date of Evaluation: 4/9/2025  Plan of Care Certification: 4/15/2025 to 5/30/2025      PT/PTA: PTA   Number of PTA visits since last PT visit: 1  Time In: 1435   Time Out: 1541  Total Time (in minutes): 66   Total Billable Time (in minutes): 63    FOTO:  Intake Score: 31%  Survey Score 2: 41%  Survey Score 3: 42%    Precautions:     Standard      Subjective   "I am dizzy today. I have had it before; it'll go away." "Yesterday it was hurting pretty bad. It's been hurting on the right side." Post previous: "Oh, it was better, looser.".  Pain reported as 6/10. B neck    Objective   Vital Signs  /76   SpO2 97%   BP Location: Left arm  BP Position: Sitting  BP Cuff Size: Adult               Treatment:  Therapeutic Exercise  TE 1: UBE lvl4 x8' (reversing midway)  TE 2: Seated Upper quadrant stretches 3x30s ea: UT, LS, SCM/SCM, Rhomboid, Middle trapezius; Green theraband resisted cervical sidebend x10 ea way  TE 3: Standing: Doorway stretch 3x30s; Stick-em ups (cervical sidebend to L) 3x30s; Blue Theratube stability training x20 ea: Rows w/scapular retractions, B shoulder extension w/scapular retractions, B shoulder ER w/scapular retractions, B shoulder horizontal abduction w/scapular retractions, B latissimus pull downs; Banded wall walks using green theraband x6 bouts  Manual Therapy  MT 1: Strumming, trigger point release to B " "upper trapezii, levator scapula, SCM, Scalene, & deep neck flexors  MT 2: Suboccipital release  MT 3: Manual cervical traction  MT 4: Cervical PROM with end range stretching & Grade I-II gapping to R C4-6      Time Entry(in minutes):  Manual Therapy Time Entry: 12  Therapeutic Exercise Time Entry: 51    Assessment & Plan   Assessment: The patient reported to physical therapy stating bilateral neck pain "almost like a crick." Patient sleeps in recliner due to back co-morbidity. Jose Alberto Hager was instructed in stability progressions to aide in improved form, spinal support, and postural endurance, in turn reducing stress on spine/nerve. Patient continues to report numbness in right upper extremity during doorway pectoral stretches; followed this with nerve glides to address. Excess tension noted in bilateral sternocleidomastoid, scalene, and deep neck flexors, as well as right upper trapezius and levator scapula.  Evaluation/Treatment Tolerance: Patient tolerated treatment well    The patient will continue to benefit from skilled outpatient physical therapy in order to address the deficits listed in the problem list on the initial evaluation, provide patient and family education, and maximize the patients level of independence in the home and community environments.     The patient's spiritual, cultural, and educational needs were considered, and the patient is agreeable to the plan of care and goals.     Education  Education was done with Patient. The patient's learning style includes Demonstration and Listening. The patient Demonstrates understanding and Verbalizes understanding.         The patient was instructed in slight progressions as noted with cues for technique.       Plan: POC: 2 times Per Week for 6 Weeks. Therapeutic exercise, Therapeutic activities, Manual therapy, ADLs/IADLs, Dry needling, Cryotherapy (cold pack), Electrical stimulation - passive/unattended, and Thermotherapy (hot pack). Jose Alberto mcgee" be treated by PTA as part of their rehab team.  The patient is to be progressed within the established plan of care as tolerated in order to accomplish stated goals. Plan to further address gapping in right cervical spine to aide in reducing radicular symptoms.    Goals:   Active       Long Term Goals       Facilitate improved upper quadrant flexibility (Progressing)       Start:  04/15/25    Expected End:  05/30/25            Patient will resume his prior sleep pattern without disruption by neck pain (Ongoing)       Start:  04/15/25    Expected End:  05/30/25            Patient will consistently perform his self care activities without restriction by neck pain/headaches (Ongoing)       Start:  04/15/25    Expected End:  05/30/25            Patient will consistently reach overhead without increased neck pain/headaches (Progressing)       Start:  04/15/25    Expected End:  05/30/25            Patient will consistently drive without increased neck pain/headaches (Ongoing)       Start:  04/15/25    Expected End:  05/30/25               Long Term Goals continued       Improve functional score to > 60% as indicated by FOTO neck survey (Progressing)       Start:  04/15/25    Expected End:  05/30/25            Patient will be independent in HEP (Progressing)       Start:  04/15/25    Expected End:  05/30/25               Short Term Goals       Decrease maximal neck pain/headaches to < 6/10 (Progressing)       Start:  04/15/25    Expected End:  05/09/25            Decrease frequency of headaches to < daily (Ongoing)       Start:  04/15/25    Expected End:  05/09/25            Decrease hypersensitivity to moderate (Met)       Start:  04/15/25    Expected End:  05/09/25    Resolved:  05/14/25         Facilitate improved posture (Progressing)       Start:  04/15/25    Expected End:  05/09/25            Patient will look to the R with no more than minimal increase in discomfort (Progressing)       Start:  04/15/25    Expected  End:  05/09/25                Jenni Self, PTA

## 2025-06-02 ENCOUNTER — CLINICAL SUPPORT (OUTPATIENT)
Dept: REHABILITATION | Facility: HOSPITAL | Age: 60
End: 2025-06-02
Payer: MEDICARE

## 2025-06-02 DIAGNOSIS — G44.329 CHRONIC POST-TRAUMATIC HEADACHE, NOT INTRACTABLE: ICD-10-CM

## 2025-06-02 DIAGNOSIS — R29.898 DECREASED ROM OF NECK: ICD-10-CM

## 2025-06-02 DIAGNOSIS — R68.89 ACTIVITY INTOLERANCE: Primary | ICD-10-CM

## 2025-06-02 PROCEDURE — 97140 MANUAL THERAPY 1/> REGIONS: CPT | Mod: PN

## 2025-06-02 PROCEDURE — 97110 THERAPEUTIC EXERCISES: CPT | Mod: PN

## 2025-06-03 ENCOUNTER — PROCEDURE VISIT (OUTPATIENT)
Dept: NEUROLOGY | Facility: CLINIC | Age: 60
End: 2025-06-03
Payer: MEDICARE

## 2025-06-03 VITALS
DIASTOLIC BLOOD PRESSURE: 80 MMHG | HEART RATE: 54 BPM | BODY MASS INDEX: 29.27 KG/M2 | SYSTOLIC BLOOD PRESSURE: 129 MMHG | WEIGHT: 198.19 LBS

## 2025-06-03 DIAGNOSIS — M53.3 SACROILIAC JOINT DYSFUNCTION OF BOTH SIDES: Primary | ICD-10-CM

## 2025-06-03 PROCEDURE — 99499 UNLISTED E&M SERVICE: CPT | Mod: S$GLB,,, | Performed by: INTERNAL MEDICINE

## 2025-06-03 RX ORDER — DULOXETIN HYDROCHLORIDE 30 MG/1
30 CAPSULE, DELAYED RELEASE ORAL DAILY
Qty: 30 CAPSULE | Refills: 2 | Status: SHIPPED | OUTPATIENT
Start: 2025-06-03

## 2025-06-16 ENCOUNTER — TELEPHONE (OUTPATIENT)
Dept: FAMILY MEDICINE | Facility: CLINIC | Age: 60
End: 2025-06-16
Payer: MEDICARE

## 2025-06-16 NOTE — TELEPHONE ENCOUNTER
I spoke with pt via phone. Pt states that he was attempting to  get sooner appt with MAXINE Crawley, states that he has back pain and HA. He states that his PT was cancel due to intolerance. He states that he was willing to continue PT. Will see MAXINE Crawley tomorrow to discuss options.     ----- Message from Claire sent at 6/13/2025  4:21 PM CDT -----  Contact: Patient  Type:  Sooner Apoointment Request    Caller is requesting a sooner appointment.  Caller declined first available appointment listed below.  Caller will not accept being placed on the waitlist and is requesting a message be sent to doctor.  Name of Caller:Patient    When is the first available appointment?July 18th     Symptoms: headaches     Would the patient rather a call back or a response via [facility]? Call    Best Call Back Number:[phone]    Additional Information: Please call to advise

## 2025-06-17 ENCOUNTER — OFFICE VISIT (OUTPATIENT)
Dept: FAMILY MEDICINE | Facility: CLINIC | Age: 60
End: 2025-06-17
Payer: MEDICARE

## 2025-06-17 VITALS
HEIGHT: 69 IN | BODY MASS INDEX: 29.52 KG/M2 | HEART RATE: 61 BPM | SYSTOLIC BLOOD PRESSURE: 122 MMHG | DIASTOLIC BLOOD PRESSURE: 78 MMHG | WEIGHT: 199.31 LBS | RESPIRATION RATE: 18 BRPM | OXYGEN SATURATION: 98 %

## 2025-06-17 DIAGNOSIS — G89.29 CHRONIC SCAPULAR PAIN: Primary | ICD-10-CM

## 2025-06-17 DIAGNOSIS — G89.29 CHRONIC MIDLINE THORACIC BACK PAIN: ICD-10-CM

## 2025-06-17 DIAGNOSIS — M54.2 CERVICALGIA: ICD-10-CM

## 2025-06-17 DIAGNOSIS — G47.33 OSA (OBSTRUCTIVE SLEEP APNEA): ICD-10-CM

## 2025-06-17 DIAGNOSIS — E78.00 HYPERCHOLESTEROLEMIA: ICD-10-CM

## 2025-06-17 DIAGNOSIS — I10 PRIMARY HYPERTENSION: ICD-10-CM

## 2025-06-17 DIAGNOSIS — G89.29 CHRONIC BILATERAL LOW BACK PAIN WITH BILATERAL SCIATICA: ICD-10-CM

## 2025-06-17 DIAGNOSIS — M54.41 CHRONIC BILATERAL LOW BACK PAIN WITH BILATERAL SCIATICA: ICD-10-CM

## 2025-06-17 DIAGNOSIS — R41.89 BRAIN FOG: ICD-10-CM

## 2025-06-17 DIAGNOSIS — M89.8X1 CHRONIC SCAPULAR PAIN: Primary | ICD-10-CM

## 2025-06-17 DIAGNOSIS — G89.4 CHRONIC PAIN SYNDROME: ICD-10-CM

## 2025-06-17 DIAGNOSIS — M54.42 CHRONIC BILATERAL LOW BACK PAIN WITH BILATERAL SCIATICA: ICD-10-CM

## 2025-06-17 DIAGNOSIS — M54.6 CHRONIC MIDLINE THORACIC BACK PAIN: ICD-10-CM

## 2025-06-17 DIAGNOSIS — R41.3 MEMORY DEFICIT: ICD-10-CM

## 2025-06-17 PROCEDURE — G2211 COMPLEX E/M VISIT ADD ON: HCPCS | Mod: S$GLB,,,

## 2025-06-17 PROCEDURE — 1160F RVW MEDS BY RX/DR IN RCRD: CPT | Mod: CPTII,S$GLB,,

## 2025-06-17 PROCEDURE — 1159F MED LIST DOCD IN RCRD: CPT | Mod: CPTII,S$GLB,,

## 2025-06-17 PROCEDURE — 99999 PR PBB SHADOW E&M-EST. PATIENT-LVL V: CPT | Mod: PBBFAC,,,

## 2025-06-17 PROCEDURE — 4010F ACE/ARB THERAPY RXD/TAKEN: CPT | Mod: CPTII,S$GLB,,

## 2025-06-17 PROCEDURE — 3078F DIAST BP <80 MM HG: CPT | Mod: CPTII,S$GLB,,

## 2025-06-17 PROCEDURE — 99214 OFFICE O/P EST MOD 30 MIN: CPT | Mod: S$GLB,,,

## 2025-06-17 PROCEDURE — 3074F SYST BP LT 130 MM HG: CPT | Mod: CPTII,S$GLB,,

## 2025-06-17 PROCEDURE — 3008F BODY MASS INDEX DOCD: CPT | Mod: CPTII,S$GLB,,

## 2025-06-17 RX ORDER — CELECOXIB 200 MG/1
200 CAPSULE ORAL 2 TIMES DAILY
Qty: 180 CAPSULE | Refills: 2 | Status: SHIPPED | OUTPATIENT
Start: 2025-06-17

## 2025-06-17 NOTE — PATIENT INSTRUCTIONS
Make sure taking cymbalta or duloxetine      Narendra Abrams,     If you are due for any health screening(s) below please notify me so we can arrange them to be ordered and scheduled to maintain your health. Most healthy patients complete it. Don't lose out on improving your health.     All of your core healthy metrics are met.
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
## 2025-06-18 DIAGNOSIS — M25.511 RIGHT SHOULDER PAIN, UNSPECIFIED CHRONICITY: Primary | ICD-10-CM

## 2025-06-20 ENCOUNTER — HOSPITAL ENCOUNTER (OUTPATIENT)
Dept: RADIOLOGY | Facility: HOSPITAL | Age: 60
Discharge: HOME | End: 2025-06-20
Payer: MEDICARE

## 2025-06-20 ENCOUNTER — OFFICE VISIT (OUTPATIENT)
Dept: ORTHOPEDICS | Facility: CLINIC | Age: 60
End: 2025-06-20
Payer: MEDICARE

## 2025-06-20 VITALS — HEIGHT: 69 IN | WEIGHT: 199.31 LBS | BODY MASS INDEX: 29.52 KG/M2 | RESPIRATION RATE: 16 BRPM

## 2025-06-20 DIAGNOSIS — G89.29 CHRONIC SCAPULAR PAIN: ICD-10-CM

## 2025-06-20 DIAGNOSIS — M25.511 RIGHT SHOULDER PAIN, UNSPECIFIED CHRONICITY: ICD-10-CM

## 2025-06-20 DIAGNOSIS — S46.811A TRAPEZIUS MUSCLE STRAIN, RIGHT, INITIAL ENCOUNTER: Primary | ICD-10-CM

## 2025-06-20 DIAGNOSIS — M89.8X1 CHRONIC SCAPULAR PAIN: ICD-10-CM

## 2025-06-20 PROCEDURE — 99999 PR PBB SHADOW E&M-EST. PATIENT-LVL IV: CPT | Mod: PBBFAC,,,

## 2025-06-20 PROCEDURE — 73030 X-RAY EXAM OF SHOULDER: CPT | Mod: TC,RT

## 2025-06-20 PROCEDURE — 73030 X-RAY EXAM OF SHOULDER: CPT | Mod: 26,RT,, | Performed by: RADIOLOGY
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
## 2025-06-30 ENCOUNTER — CLINICAL SUPPORT (OUTPATIENT)
Dept: REHABILITATION | Facility: HOSPITAL | Age: 60
End: 2025-06-30
Payer: MEDICARE

## 2025-06-30 DIAGNOSIS — M54.6 CHRONIC MIDLINE THORACIC BACK PAIN: ICD-10-CM

## 2025-06-30 DIAGNOSIS — R68.89 ACTIVITY INTOLERANCE: Primary | ICD-10-CM

## 2025-06-30 DIAGNOSIS — G89.29 CHRONIC MIDLINE THORACIC BACK PAIN: ICD-10-CM

## 2025-06-30 DIAGNOSIS — M89.8X1 CHRONIC SCAPULAR PAIN: ICD-10-CM

## 2025-06-30 DIAGNOSIS — M54.2 CERVICALGIA: ICD-10-CM

## 2025-06-30 DIAGNOSIS — G89.29 CHRONIC SCAPULAR PAIN: ICD-10-CM

## 2025-06-30 DIAGNOSIS — M25.611 DECREASED ROM OF RIGHT SHOULDER: ICD-10-CM

## 2025-06-30 PROCEDURE — 97162 PT EVAL MOD COMPLEX 30 MIN: CPT | Mod: PN

## 2025-06-30 PROCEDURE — 97140 MANUAL THERAPY 1/> REGIONS: CPT | Mod: PN
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
## 2025-07-09 ENCOUNTER — CLINICAL SUPPORT (OUTPATIENT)
Dept: REHABILITATION | Facility: HOSPITAL | Age: 60
End: 2025-07-09
Payer: MEDICARE

## 2025-07-09 DIAGNOSIS — M54.6 CHRONIC MIDLINE THORACIC BACK PAIN: ICD-10-CM

## 2025-07-09 DIAGNOSIS — G89.29 CHRONIC SCAPULAR PAIN: ICD-10-CM

## 2025-07-09 DIAGNOSIS — M25.611 DECREASED ROM OF RIGHT SHOULDER: ICD-10-CM

## 2025-07-09 DIAGNOSIS — M54.2 CERVICALGIA: ICD-10-CM

## 2025-07-09 DIAGNOSIS — M89.8X1 CHRONIC SCAPULAR PAIN: ICD-10-CM

## 2025-07-09 DIAGNOSIS — G89.29 CHRONIC MIDLINE THORACIC BACK PAIN: ICD-10-CM

## 2025-07-09 DIAGNOSIS — R68.89 ACTIVITY INTOLERANCE: Primary | ICD-10-CM

## 2025-07-09 PROCEDURE — 97140 MANUAL THERAPY 1/> REGIONS: CPT | Mod: PN,CQ

## 2025-07-09 PROCEDURE — 97110 THERAPEUTIC EXERCISES: CPT | Mod: PN,CQ

## 2025-07-09 NOTE — PROGRESS NOTES
"  Outpatient Rehab    Physical Therapy Visit    Patient Name: Jose Alberto Hager  MRN: [id number]  YOB: 1965  Encounter Date: 7/9/2025    Therapy Diagnosis:   Encounter Diagnoses   Name Primary?    Activity intolerance Yes    Decreased ROM of right shoulder     Chronic scapular pain     Cervicalgia     Chronic midline thoracic back pain      Physician: Jas Crawley PA-C    Physician Orders: Eval and Treat  Medical Diagnosis: Chronic scapular pain  Cervicalgia  Chronic midline thoracic back pain  Surgical Diagnosis: Not applicable for this Episode   Surgical Date: Not applicable for this Episode  Days Since Last Surgery: Not applicable for this Episode    Visit # / Visits Authorized: 1 / 16 (2 total)  Insurance Authorization Period: 7/1/2025 to 8/30/2025  Date of Evaluation: 6/30/2025  Plan of Care Certification: 6/30/2025 to 8/15/2025      PT/PTA: PTA   Number of PTA visits since last PT visit: 1  Time In: 1236   Time Out: 1333  Total Time (in minutes): 57   Total Billable Time (in minutes): 55    FOTO:  Intake Score: 40%  Survey Score 2:  %  Survey Score 3:  %    Precautions:     Standard      Subjective   "It's about the same in that shoulder, and headaches. I had headaches yesterday; it isn't too bad today.".  Pain reported as 6/10. R scapula and headache    Objective            Treatment:  Therapeutic Exercise  TE 1: UBE lvl2 x6' (reversing midway)  TE 2: Seated: Upper trapezius stretches (contralateral UE behind) 3x30s ea, Levator scapulae stretches (contralateral UE on shoulder) 3x30s ea, Scalene/SCM 3 way (contralateral UE behind) 3x30s ea, Rhomboid stretches 3x30s, Middle trapezius stretches 3x30s ea, Modified thread the needle to stretch lower scapular region 5x5s; Trunk AROM stretches 10x3s: Sidebend w/overhead reach, Flexion physioball rollout, Extension  Manual Therapy  MT 1: Strumming & Trigger-point release jessica right scapula: supraspinatus, middle trapezius, and rhomboids  MT 2: " R Scapular scouring in prone & L sidelying  MT 3: Myofascial release to R levator scapula & scalene, to L levator scapula, scalene, & deep neck flexors  MT 4: Suboccipital release  MT 5: Manual cervical traction  MT 6: Passive cervical ROM w/end range stretching      Time Entry(in minutes):  Manual Therapy Time Entry: 12  Therapeutic Exercise Time Entry: 43    Assessment & Plan   Assessment: The patient reported to physical therapy stating continued right scapular discomfort and headaches, not as much today per report (headache). Jose Alberto Hager was instructed in initial treatment program targeting increased flexibility, reduced stress on joints, increased functional mobility, and improved postural stability and endurance. Excess tension noted jessica right scapula and lateral cervical regions, bilaterally.  Evaluation/Treatment Tolerance: Patient tolerated treatment well    The patient will continue to benefit from skilled outpatient physical therapy in order to address the deficits listed in the problem list on the initial evaluation, provide patient and family education, and maximize the patients level of independence in the home and community environments.     The patient's spiritual, cultural, and educational needs were considered, and the patient is agreeable to the plan of care and goals.     Education  Education was done with Patient. The patient's learning style includes Demonstration and Listening. The patient Demonstrates understanding and Verbalizes understanding.         The patient was instructed in initial treatment program with reasoning behind. Reviewed the rehabilitation process, and upcoming progressions.       Plan: The patient is to be progressed within the established plan of care as tolerated in order to accomplish stated goals. Plan to incorporate progressive stability training as tolerated. Provide updated home exercise program, more inclusive to pertinent episode.    Goals:   Active        Long Term Goals       Facilitate decreased upper quadrant tightness (Progressing)       Start:  06/30/25    Expected End:  08/15/25            Patient will sleep > 6 hours without interruption by shoulder pain 5 of 7 nights per week (Ongoing)       Start:  06/30/25    Expected End:  08/15/25            Patient will consistently perform self care activities without assistance (Ongoing)       Start:  06/30/25    Expected End:  08/15/25            Patient will reach to 2nd shelf in overhead cabinet using R UE without increase in shoulder pain (Ongoing)       Start:  06/30/25    Expected End:  08/15/25            Patient will consistently utilize R UE to assist with driving activities without significant increase in symptomsl (Ongoing)       Start:  06/30/25    Expected End:  08/15/25            Patient will resume light to moderate yard care activities without increased shoulder pain/headaches (Ongoing)       Start:  06/30/25    Expected End:  08/15/25            Improve functional outcomes score to > 60% as indicated by FOTO shoulder survey (Ongoing)       Start:  06/30/25    Expected End:  08/15/25            Patient will be independent in HEP  (Ongoing)       Start:  06/30/25    Expected End:  08/15/25               Short Term Goals       Decrease trigger point tenderness by at least 25% as indicated by patient report (Ongoing)       Start:  06/30/25    Expected End:  07/24/25            Patient will maintain erect sitting posture without requiring frequent verbal/tactile cues to corred (Progressing)       Start:  06/30/25    Expected End:  07/24/25            Decrease maximum pain to < 7/10  (Ongoing)       Start:  06/30/25    Expected End:  07/24/25            Patient will reach to at least 1 shelf overhead using R UE without increased pian (Ongoing)       Start:  06/30/25    Expected End:  07/24/25            Patient will sleep at least 5 hours without interruption caused by pain 4 of 7 nights per week (Ongoing)        Start:  06/30/25    Expected End:  07/24/25                Jenni Self, CAM

## 2025-07-10 ENCOUNTER — TELEPHONE (OUTPATIENT)
Dept: FAMILY MEDICINE | Facility: CLINIC | Age: 60
End: 2025-07-10
Payer: MEDICARE

## 2025-07-10 NOTE — TELEPHONE ENCOUNTER
Spoke with patient notified him I did not see any documentation of anyone calling from our office. Verbalized understanding

## 2025-07-10 NOTE — TELEPHONE ENCOUNTER
Copied from CRM #[id number]. Topic: General Inquiry - Return Call  >> Jul 9, 2025  4:15 PM Nataliia wrote:  Type:  Patient Returning Call    Who Called:patient at [phone]    Who Left Message for Patient:unknown     Additional Information: Please call patient and advise. There was no name left on who gave patient a call, please call and advise. Thank you.

## 2025-07-11 ENCOUNTER — CLINICAL SUPPORT (OUTPATIENT)
Dept: REHABILITATION | Facility: HOSPITAL | Age: 60
End: 2025-07-11
Payer: MEDICARE

## 2025-07-11 DIAGNOSIS — R68.89 ACTIVITY INTOLERANCE: Primary | ICD-10-CM

## 2025-07-11 DIAGNOSIS — G89.29 CHRONIC MIDLINE THORACIC BACK PAIN: ICD-10-CM

## 2025-07-11 DIAGNOSIS — M25.611 DECREASED ROM OF RIGHT SHOULDER: ICD-10-CM

## 2025-07-11 DIAGNOSIS — G89.29 CHRONIC SCAPULAR PAIN: ICD-10-CM

## 2025-07-11 DIAGNOSIS — M54.6 CHRONIC MIDLINE THORACIC BACK PAIN: ICD-10-CM

## 2025-07-11 DIAGNOSIS — M54.2 CERVICALGIA: ICD-10-CM

## 2025-07-11 DIAGNOSIS — M89.8X1 CHRONIC SCAPULAR PAIN: ICD-10-CM

## 2025-07-11 PROCEDURE — 97140 MANUAL THERAPY 1/> REGIONS: CPT | Mod: PN,CQ

## 2025-07-11 PROCEDURE — 97110 THERAPEUTIC EXERCISES: CPT | Mod: PN,CQ

## 2025-07-11 NOTE — PROGRESS NOTES
"  Outpatient Rehab    Physical Therapy Visit    Patient Name: Jose Alberto Hager  MRN: [id number]  YOB: 1965  Encounter Date: 7/11/2025    Therapy Diagnosis:   Encounter Diagnoses   Name Primary?    Activity intolerance Yes    Decreased ROM of right shoulder     Chronic scapular pain     Cervicalgia     Chronic midline thoracic back pain      Physician: Jas Crawley PA-C    Physician Orders: Eval and Treat  Medical Diagnosis: Chronic scapular pain  Cervicalgia  Chronic midline thoracic back pain  Surgical Diagnosis: Not applicable for this Episode   Surgical Date: Not applicable for this Episode  Days Since Last Surgery: Not applicable for this Episode    Visit # / Visits Authorized: 2 / 16 (3 total)  Insurance Authorization Period: 7/1/2025 to 8/30/2025  Date of Evaluation: 6/30/2025  Plan of Care Certification: 6/30/2025 to 8/15/2025      PT/PTA: PTA   Number of PTA visits since last PT visit: 2  Time In: 1336   Time Out: 1453  Total Time (in minutes): 77   Total Billable Time (in minutes): 50 due to being doubled    FOTO:  Intake Score: 40%  Survey Score 2:  %  Survey Score 3:  %    Precautions:     Standard      Subjective   "I'm alright." Post previous: "Oh it was bad, I was sore where you worked on that one muscle. I woke up sore, and I had a headache all day.".  Pain reported as 6/10. R posterior neck & scapula    Objective            Treatment:  Therapeutic Exercise  TE 1: UBE lvl2 x6' (reversing midway)  TE 2: Seated: Upper trapezius stretches (contralateral UE behind) 3x30s ea, Levator scapulae stretches (contralateral UE on shoulder) 3x30s ea, Scalene/SCM 3 way (contralateral UE behind) 3x30s ea, Rhomboid stretches 3x30s, Middle trapezius stretches 3x30s ea, Modified thread the needle to stretch lower scapular region 5x5s; Cervical AROM 10x3s ea: Flex/Ext, Sidebend, Rotation, Diagonals; Chin tucks x15; Trunk AROM stretches 10x3s: Sidebend w/overhead reach, Flexion physioball rollout, " Extension  TE 3: Standing: Rows w/scapular retractions (green) x20, B shoulder extension w/scapular retractions (green) x20  Manual Therapy  MT 1: Strumming & Trigger-point release jessica right scapula: middle trapezius, rhomboids, & thoracic paraspinals  MT 2: R Scapular scouring in L sidelying  MT 3: Myofascial release to B upper trapezius, levator scapula, scalene, SCM, & deep neck flexors  MT 4: Suboccipital release  MT 5: Manual cervical traction  MT 6: Passive cervical ROM w/end range stretching      Time Entry(in minutes):  Manual Therapy Time Entry: 12  Therapeutic Exercise Time Entry: 63    Assessment & Plan   Assessment: The patient reported to physical therapy stating continued moderate posterior neck and shoulder blade. Jose Alberto Hager was progressed slightly with increased flexibility/functional range of motion training, as well as re-incorporation of stability training to aide in form and scapulothoracic rhythm. Patient demonstrates decreasing soft tissue restrictions with sensitivity mainly over thoracic paraspinals at this time.  Evaluation/Treatment Tolerance: Patient tolerated treatment well    The patient will continue to benefit from skilled outpatient physical therapy in order to address the deficits listed in the problem list on the initial evaluation, provide patient and family education, and maximize the patients level of independence in the home and community environments.     The patient's spiritual, cultural, and educational needs were considered, and the patient is agreeable to the plan of care and goals.     Education  Education was done with Patient. The patient's learning style includes Demonstration and Listening. The patient Demonstrates understanding and Verbalizes understanding.         The patient was instructed in slight progressions with verbal and visual cues for technique.       Plan: The patient is to be progressed within the established plan of care as tolerated in order to  accomplish stated goals. Plan to incorporate additional progressive stability training as tolerated. Provide updated home exercise program, more inclusive to pertinent episode.    Goals:   Active       Long Term Goals       Facilitate decreased upper quadrant tightness (Progressing)       Start:  06/30/25    Expected End:  08/15/25            Patient will sleep > 6 hours without interruption by shoulder pain 5 of 7 nights per week (Ongoing)       Start:  06/30/25    Expected End:  08/15/25            Patient will consistently perform self care activities without assistance (Ongoing)       Start:  06/30/25    Expected End:  08/15/25            Patient will reach to 2nd shelf in overhead cabinet using R UE without increase in shoulder pain (Ongoing)       Start:  06/30/25    Expected End:  08/15/25            Patient will consistently utilize R UE to assist with driving activities without significant increase in symptomsl (Ongoing)       Start:  06/30/25    Expected End:  08/15/25            Patient will resume light to moderate yard care activities without increased shoulder pain/headaches (Ongoing)       Start:  06/30/25    Expected End:  08/15/25            Improve functional outcomes score to > 60% as indicated by FOTO shoulder survey (Ongoing)       Start:  06/30/25    Expected End:  08/15/25            Patient will be independent in HEP  (Ongoing)       Start:  06/30/25    Expected End:  08/15/25               Short Term Goals       Decrease trigger point tenderness by at least 25% as indicated by patient report (Progressing)       Start:  06/30/25    Expected End:  07/24/25            Patient will maintain erect sitting posture without requiring frequent verbal/tactile cues to corred (Progressing)       Start:  06/30/25    Expected End:  07/24/25            Decrease maximum pain to < 7/10  (Progressing)       Start:  06/30/25    Expected End:  07/24/25            Patient will reach to at least 1 shelf overhead  using R UE without increased pian (Ongoing)       Start:  06/30/25    Expected End:  07/24/25            Patient will sleep at least 5 hours without interruption caused by pain 4 of 7 nights per week (Ongoing)       Start:  06/30/25    Expected End:  07/24/25                Jenni Self, PTA
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
## 2025-07-14 ENCOUNTER — OFFICE VISIT (OUTPATIENT)
Dept: NEUROLOGY | Facility: CLINIC | Age: 60
End: 2025-07-14
Payer: MEDICARE

## 2025-07-14 VITALS — DIASTOLIC BLOOD PRESSURE: 80 MMHG | HEART RATE: 62 BPM | SYSTOLIC BLOOD PRESSURE: 134 MMHG

## 2025-07-14 DIAGNOSIS — G44.329 CHRONIC POST-TRAUMATIC HEADACHE, NOT INTRACTABLE: Primary | ICD-10-CM

## 2025-07-14 DIAGNOSIS — R44.2 OLFACTORY HALLUCINATION: ICD-10-CM

## 2025-07-14 DIAGNOSIS — M79.12 MYALGIA OF AUXILIARY MUSCLES, HEAD AND NECK: ICD-10-CM

## 2025-07-14 PROCEDURE — 3079F DIAST BP 80-89 MM HG: CPT | Mod: CPTII,S$GLB,, | Performed by: INTERNAL MEDICINE

## 2025-07-14 PROCEDURE — 20553 NJX 1/MLT TRIGGER POINTS 3/>: CPT | Mod: S$GLB,,, | Performed by: INTERNAL MEDICINE

## 2025-07-14 PROCEDURE — 4010F ACE/ARB THERAPY RXD/TAKEN: CPT | Mod: CPTII,S$GLB,, | Performed by: INTERNAL MEDICINE

## 2025-07-14 PROCEDURE — 99999 PR PBB SHADOW E&M-EST. PATIENT-LVL IV: CPT | Mod: PBBFAC,,, | Performed by: INTERNAL MEDICINE

## 2025-07-14 PROCEDURE — 3075F SYST BP GE 130 - 139MM HG: CPT | Mod: CPTII,S$GLB,, | Performed by: INTERNAL MEDICINE

## 2025-07-14 PROCEDURE — 1159F MED LIST DOCD IN RCRD: CPT | Mod: CPTII,S$GLB,, | Performed by: INTERNAL MEDICINE

## 2025-07-14 PROCEDURE — 99214 OFFICE O/P EST MOD 30 MIN: CPT | Mod: 25,S$GLB,, | Performed by: INTERNAL MEDICINE

## 2025-07-14 PROCEDURE — 1160F RVW MEDS BY RX/DR IN RCRD: CPT | Mod: CPTII,S$GLB,, | Performed by: INTERNAL MEDICINE

## 2025-07-14 PROCEDURE — 76942 ECHO GUIDE FOR BIOPSY: CPT | Mod: S$GLB,,, | Performed by: INTERNAL MEDICINE

## 2025-07-14 RX ORDER — METOCLOPRAMIDE 10 MG/1
10 TABLET ORAL 2 TIMES DAILY PRN
Qty: 10 TABLET | Refills: 11 | Status: SHIPPED | OUTPATIENT
Start: 2025-07-14

## 2025-07-14 RX ORDER — DEXAMETHASONE SODIUM PHOSPHATE 10 MG/ML
10 INJECTION INTRAMUSCULAR; INTRAVENOUS
Status: COMPLETED | OUTPATIENT
Start: 2025-07-14 | End: 2025-07-16

## 2025-07-14 RX ORDER — GABAPENTIN 300 MG/1
300 CAPSULE ORAL NIGHTLY
Qty: 30 CAPSULE | Refills: 11 | Status: SHIPPED | OUTPATIENT
Start: 2025-07-14

## 2025-07-14 NOTE — PATIENT INSTRUCTIONS
-- continue duloxetine 30 mg daily  -- schedule gabapentin 300 mg at night to help with occipital neuralgia (nerve pain on the back of your head) and rhomboid muscle pain  -- Reglan 10 mg for rescue. If you get antsy, you can take benadryl 12.5 or 25 mg. We recommend that you do not take the reglan too much  -- For your smell episodes that no one else can smell, we will get an EEG x 48 hrs (brainwave study) to make sure you're not having little seizures  -- schedule TPI in 2 weeks  -- Follow-up in 1 month

## 2025-07-14 NOTE — PROCEDURES
Trigger Point Injection    Performed by: Jermaine Cotton MD  Authorized by: Jermaine Cotton MD      Consent Done?:  Yes (Verbal)    Pre-Procedure:   Indications:  Pain  Site marked: the procedure site was marked     Timeout: prior to procedure the correct patient, procedure, and site was verified    Prep: patient was prepped and draped in usual sterile fashion     Local anesthesia used?: Yes    Anesthesia:  Local infiltration  Local anesthetic: ropi 0.5%  Anesthetic total (ml):  10      Description of Procedure:    The patient was identified and consented verbally to procedure(s) listed below. The patient acknowledged the risks of the procedure, which include (but not necessarily limited to) pain, bleeding, bruising, infection at the injection site, nerve injury / damage, lung injury (for trigger point injections and nerve blocks in the thoracic region), bowel injury (for trigger point injections and nerve blocks of the abdomen), local anesthetic toxicity, spinal cord and/or major vessel injury (for cervical procedures). The patient wished to proceed with the procedure.     They were then placed sitting upright and prone in the clinic room, the site was identified / marked, and the area was sterilized using chlorhexidine.     Then, the following was performed:    Trigger point injection of the right medial occipitalis    Trigger point injection of the right lateral occipitalis    Trigger point injection of the right Rhomboids using ultrasound guidance    Needle used: 27G, 1.5 in (3.81 cm) needle  Type and total local anesthetic used: 10 mL of ropivacaine 0.5% and 0 mL of lidocaine 1%   Type and total steroid used:  1 cc of Dex 10 mg    The site(s) was/were cleaned and the patient was instructed to avoid submersion in water x 48 hours. They were told to let the clinic know if there were concerns for infection (increased redness, heat, pain, swelling) in the area receiving the procedure.      Ultrasound was used for this procedure and image has been uploaded to the chart: Yes    Pain Prior to Procedure: 5/10  Pain After the Procedure: 0/10    Jermaine Cotton MD  Headache and Pain Management  [facility]

## 2025-07-16 RX ADMIN — DEXAMETHASONE SODIUM PHOSPHATE 10 MG: 10 INJECTION INTRAMUSCULAR; INTRAVENOUS at 08:07

## 2025-07-17 ENCOUNTER — CLINICAL SUPPORT (OUTPATIENT)
Dept: REHABILITATION | Facility: HOSPITAL | Age: 60
End: 2025-07-17
Payer: MEDICARE

## 2025-07-17 DIAGNOSIS — M25.611 DECREASED ROM OF RIGHT SHOULDER: ICD-10-CM

## 2025-07-17 DIAGNOSIS — M54.2 CERVICALGIA: ICD-10-CM

## 2025-07-17 DIAGNOSIS — G89.29 CHRONIC MIDLINE THORACIC BACK PAIN: ICD-10-CM

## 2025-07-17 DIAGNOSIS — R68.89 ACTIVITY INTOLERANCE: Primary | ICD-10-CM

## 2025-07-17 DIAGNOSIS — G89.29 CHRONIC SCAPULAR PAIN: ICD-10-CM

## 2025-07-17 DIAGNOSIS — M54.6 CHRONIC MIDLINE THORACIC BACK PAIN: ICD-10-CM

## 2025-07-17 DIAGNOSIS — M89.8X1 CHRONIC SCAPULAR PAIN: ICD-10-CM

## 2025-07-17 PROCEDURE — 97110 THERAPEUTIC EXERCISES: CPT | Mod: PN

## 2025-07-17 PROCEDURE — 97140 MANUAL THERAPY 1/> REGIONS: CPT | Mod: PN

## 2025-07-17 NOTE — PROGRESS NOTES
"  Outpatient Rehab    Physical Therapy Visit    Patient Name: Jose Alberto Hager  MRN: [id number]  YOB: 1965  Encounter Date: 7/17/2025    Therapy Diagnosis:   Encounter Diagnoses   Name Primary?    Activity intolerance Yes    Decreased ROM of right shoulder     Chronic midline thoracic back pain     Cervicalgia     Chronic scapular pain      Physician: Jas Crawley PA-C    Physician Orders: Eval and Treat  Medical Diagnosis: Chronic scapular pain  Cervicalgia  Chronic midline thoracic back pain  Surgical Diagnosis: Not applicable for this Episode   Surgical Date: Not applicable for this Episode  Days Since Last Surgery: Not applicable for this Episode    Visit # / Visits Authorized:  3 / 16  Insurance Authorization Period: 7/1/2025 to 8/30/2025  Date of Evaluation: 6/30/2025  Plan of Care Certification: 6/30/2025 to 8/15/2025      PT/PTA: PT   Number of PTA visits since last PT visit:   Time In: 1333   Time Out: 1437  Total Time (in minutes): 64   Total Billable Time (in minutes): 55    FOTO:  Intake Score (%): 40  Survey Score 2 (%): Not applicable for this Episode  Survey Score 3 (%): Not applicable for this Episode    Precautions:  Additional Precaution and Protocol Details: Standard  Additional Precautions and Protocol Details: Standard    Subjective   Post prior treatment: "I felt like I was worked over pretty good after last time" "Had 4-5 injections in my shoulder and 3 in my hip. They seemed to help".  Pain reported as 5/10. B neck. R>L.  going down into my shoulder (R).    Objective          Treatment:  Therapeutic Exercise  TE 1: UBE lvl2.5 x6' (reversing midway)  TE 2: Seated: Upper trapezius stretches 3x30s ea, Levator scapulae stretches 3x30s ea, Scalene/SCM 2 way (contralateral UE behind) 3x30s ea, Rhomboid stretches 3x30s, Middle trapezius stretches 3x30s ea,; Cervical AROM 10x3s ea: Flex/Ext, Sidebend, Rotation, Diagonals; Chin tucks x15; Trunk AROM stretches 10x3s: Sidebend, " Flexion, Extension over towel roll  TE 3: Standing: Rows w/scapular retractions (green) x20, B shoulder extension w/scapular retractions (green) x20, Latissimus pull downs (green) x 20; Thread the needle to stretch lower scapular region 5x5s  Manual Therapy  MT 1: Strumming & Trigger-point release jessica right scapula: R infraspinatus, R levator scapula, R supraspinatus.  MT 2: R Scapular scouring in prone  MT 4: Suboccipital release, Manual cervical traction, Passive cervical ROM w/end range stretching  MT 5: Grade II lateral to medial glide C7-T2  MT 6: Grade II posterior to anterior glide T3-T7    Time Entry(in minutes):  Manual Therapy Time Entry: 12  Therapeutic Exercise Time Entry: 43    Assessment & Plan   Assessment: Antony presented to PT today reporting feeling less pain in affected area, possibly related to injections recently received. He exhibited significant trigger points in R infraspinatus, R supraspinatus, R levator scapula. These diminished somewhat following release; however, they were still present. Minimal crepitus noted with scapular scouring on R.   Evaluation/Treatment Tolerance: Patient tolerated treatment well    The patient will continue to benefit from skilled outpatient physical therapy in order to address the deficits listed in the problem list on the initial evaluation, provide patient and family education, and maximize the patients level of independence in the home and community environments.     The patient's spiritual, cultural, and educational needs were considered, and the patient is agreeable to the plan of care and goals.     Education  Education was done with Patient. The patient's learning style includes Demonstration, Listening, and Tactile. The patient Verbalizes understanding and Requires continuing/additional education.         Patient requires frequent verbal and tactile cues for correct exercise performance.     Plan: The patient is to be progressed within the established  plan of care as tolerated in order to accomplish stated goals. Plan to add resisted B shoulder ER with scapular retraction, light chair push-ups to strengthen scapular depressors. Update HEP instructions as needed.    Goals:   Active       Long Term Goals       Facilitate decreased upper quadrant tightness (Progressing)       Start:  06/30/25    Expected End:  08/15/25            Patient will sleep > 6 hours without interruption by shoulder pain 5 of 7 nights per week (Ongoing)       Start:  06/30/25    Expected End:  08/15/25            Patient will consistently perform self care activities without assistance (Progressing)       Start:  06/30/25    Expected End:  08/15/25            Patient will reach to 2nd shelf in overhead cabinet using R UE without increase in shoulder pain (Ongoing)       Start:  06/30/25    Expected End:  08/15/25            Patient will consistently utilize R UE to assist with driving activities without significant increase in symptomsl (Ongoing)       Start:  06/30/25    Expected End:  08/15/25            Patient will resume light to moderate yard care activities without increased shoulder pain/headaches (Ongoing)       Start:  06/30/25    Expected End:  08/15/25            Improve functional outcomes score to > 60% as indicated by FOTO shoulder survey (Ongoing)       Start:  06/30/25    Expected End:  08/15/25            Patient will be independent in HEP  (Progressing)       Start:  06/30/25    Expected End:  08/15/25               Short Term Goals       Decrease trigger point tenderness by at least 25% as indicated by patient report (Progressing)       Start:  06/30/25    Expected End:  07/24/25            Patient will maintain erect sitting posture without requiring frequent verbal/tactile cues to corred (Progressing)       Start:  06/30/25    Expected End:  07/24/25            Decrease maximum pain to < 7/10  (Progressing)       Start:  06/30/25    Expected End:  07/24/25             Patient will reach to at least 1 shelf overhead using R UE without increased pian (Ongoing)       Start:  06/30/25    Expected End:  07/24/25            Patient will sleep at least 5 hours without interruption caused by pain 4 of 7 nights per week (Progressing)       Start:  06/30/25    Expected End:  07/24/25                Gabriella Solorio, PT

## 2025-07-23 ENCOUNTER — OFFICE VISIT (OUTPATIENT)
Dept: FAMILY MEDICINE | Facility: CLINIC | Age: 60
End: 2025-07-23
Payer: MEDICARE

## 2025-07-23 VITALS
DIASTOLIC BLOOD PRESSURE: 70 MMHG | HEIGHT: 69 IN | SYSTOLIC BLOOD PRESSURE: 120 MMHG | HEART RATE: 66 BPM | RESPIRATION RATE: 18 BRPM | OXYGEN SATURATION: 96 % | BODY MASS INDEX: 30.47 KG/M2 | WEIGHT: 205.69 LBS

## 2025-07-23 DIAGNOSIS — G89.29 CHRONIC MIDLINE THORACIC BACK PAIN: ICD-10-CM

## 2025-07-23 DIAGNOSIS — M62.838 NECK MUSCLE SPASM: ICD-10-CM

## 2025-07-23 DIAGNOSIS — M54.6 CHRONIC MIDLINE THORACIC BACK PAIN: ICD-10-CM

## 2025-07-23 DIAGNOSIS — M54.2 CERVICALGIA: Primary | ICD-10-CM

## 2025-07-23 DIAGNOSIS — G47.00 INSOMNIA, UNSPECIFIED TYPE: ICD-10-CM

## 2025-07-23 DIAGNOSIS — R00.2 PALPITATIONS: ICD-10-CM

## 2025-07-23 PROCEDURE — 99214 OFFICE O/P EST MOD 30 MIN: CPT | Mod: S$GLB,,,

## 2025-07-23 PROCEDURE — 4010F ACE/ARB THERAPY RXD/TAKEN: CPT | Mod: CPTII,S$GLB,,

## 2025-07-23 PROCEDURE — 1160F RVW MEDS BY RX/DR IN RCRD: CPT | Mod: CPTII,S$GLB,,

## 2025-07-23 PROCEDURE — G2211 COMPLEX E/M VISIT ADD ON: HCPCS | Mod: S$GLB,,,

## 2025-07-23 PROCEDURE — 3074F SYST BP LT 130 MM HG: CPT | Mod: CPTII,S$GLB,,

## 2025-07-23 PROCEDURE — 99999 PR PBB SHADOW E&M-EST. PATIENT-LVL V: CPT | Mod: PBBFAC,,,

## 2025-07-23 PROCEDURE — 1159F MED LIST DOCD IN RCRD: CPT | Mod: CPTII,S$GLB,,

## 2025-07-23 PROCEDURE — 3078F DIAST BP <80 MM HG: CPT | Mod: CPTII,S$GLB,,

## 2025-07-23 PROCEDURE — 3008F BODY MASS INDEX DOCD: CPT | Mod: CPTII,S$GLB,,

## 2025-07-23 RX ORDER — AMITRIPTYLINE HYDROCHLORIDE 25 MG/1
25 TABLET, FILM COATED ORAL NIGHTLY PRN
Qty: 30 TABLET | Refills: 2 | Status: SHIPPED | OUTPATIENT
Start: 2025-07-23 | End: 2026-07-23

## 2025-07-23 RX ORDER — TIZANIDINE 4 MG/1
4 TABLET ORAL NIGHTLY PRN
COMMUNITY
Start: 2025-07-15

## 2025-07-25 NOTE — PROGRESS NOTES
"Subjective:       Patient ID: Jose Alberto Hager is a 59 y.o. male.    Chief Complaint: Follow-up    Follow-up        History of Present Illness    CHIEF COMPLAINT:  Patient presents today for follow up of headaches    HEADACHE MANAGEMENT:  He recently saw a headache specialist 2 weeks ago who performed 3 occipital and 5 shoulder injections. Specialist is considering comprehensive Botox treatment as potential management strategy. He has an upcoming follow-up visit with the specialist scheduled for the 9th of next month. He initiated Cymbalta 30mg for headaches and chronic pain but discontinued due to significant side effects, including disorientation while walking.    CARDIAC SYMPTOMS:  He reports experiencing cardiac flutter sensation in throat lasting approximately 10 seconds, occurring once every three days. He also notes pain on the side of chest.    SLEEP DISORDERS:  He reports difficulty falling asleep and severe snoring. He uses a CPAP machine which helps with initial sleep onset but subsequently causes awakening due to excessive air pressure.      ROS:  General: positive difficulty falling asleep  Eyes: positive blind spots  Cardiovascular: positive chest pain, positive feeling of flutter in chest  Respiratory: positive snoring  Neurological: positive headache  Head: positive head pain          Past Medical History:   Diagnosis Date    Back pain     Coronary artery disease     High cholesterol     Hypertension     Insomnia        Review of patient's allergies indicates:  No Known Allergies    Current Medications[1]    Review of Systems    Objective:      /70 (BP Location: Right arm, Patient Position: Sitting)   Pulse 66   Resp 18   Ht 5' 9" (1.753 m)   Wt 93.3 kg (205 lb 11 oz)   SpO2 96%   BMI 30.38 kg/m²   Physical Exam  Vitals reviewed.   Constitutional:       General: He is not in acute distress.     Appearance: Normal appearance. He is obese. He is not ill-appearing, toxic-appearing or " diaphoretic.   HENT:      Head: Normocephalic.      Right Ear: External ear normal.      Left Ear: External ear normal.      Nose: Nose normal. No congestion or rhinorrhea.      Mouth/Throat:      Mouth: Mucous membranes are moist.      Pharynx: Oropharynx is clear.   Eyes:      General: No scleral icterus.        Right eye: No discharge.         Left eye: No discharge.      Extraocular Movements: Extraocular movements intact.      Conjunctiva/sclera: Conjunctivae normal.   Cardiovascular:      Rate and Rhythm: Normal rate and regular rhythm.      Pulses: Normal pulses.      Heart sounds: Normal heart sounds. No murmur heard.     No friction rub. No gallop.   Pulmonary:      Effort: Pulmonary effort is normal. No respiratory distress.      Breath sounds: Normal breath sounds. No wheezing, rhonchi or rales.   Chest:      Chest wall: No tenderness.   Musculoskeletal:         General: Tenderness present. No swelling or deformity. Normal range of motion.      Cervical back: Normal range of motion.      Right lower leg: No edema.      Left lower leg: No edema.   Skin:     General: Skin is warm and dry.      Capillary Refill: Capillary refill takes less than 2 seconds.      Coloration: Skin is not jaundiced.      Findings: No bruising, erythema, lesion or rash.   Neurological:      Mental Status: He is alert and oriented to person, place, and time.   Psychiatric:         Mood and Affect: Mood normal.         Behavior: Behavior normal.         Thought Content: Thought content normal.         Judgment: Judgment normal.             Assessment:       1. Cervicalgia    2. Chronic midline thoracic back pain    3. Insomnia, unspecified type    4. Palpitations    5. Neck muscle spasm          Assessment & Plan    IMPRESSION:  - Considered Botox injections for chronic headaches based on specialist's recommendation.  - Discontinued Cymbalta due to side effects after evaluating effectiveness for headaches and chronic pain.  -  Investigating potential cardiac origin of throat discomfort and occasional flutter sensation.  - Assessed for possible muscle spasms in throat or neck as alternative cause of discomfort.    PLAN SUMMARY:  - Prescribed new medication for insomnia and pain management (1-month supply, 2 refills)  - Ordered 3-day cardiac monitor to assess chest pain and heart flutter  - Cancelled future pulmonology appointment in October  - Patient to consult with Dr. De La Torre for sleep apnea management and CPAP regulation  - Follow up in October for repeat labs  - Follow-up with Dr. De La Torre (pulmonologist) on August 5 for sleep apnea management  - Contact office if any adverse effects arise with new medication    HEADACHE MANAGEMENT:  - Monitored the patient's ongoing headache issues.  - Patient has seen a headache specialist who administered injections in the posterior cranium and shoulder.  - Patient is considering Botox treatment for headache relief.    OBSTRUCTIVE SLEEP APNEA:  - Monitored patient's report of severe nocturnal snoring and need for CPAP machine regulation.  - Scheduled follow-up with Dr. De La Torre (pulmonologist) on August 5 for sleep apnea management and CPAP machine regulation.  - Cancelled future pulmonology appointment in October.  - Advised patient to consult with Dr. De La Torre for comprehensive sleep apnea management.    INSOMNIA:  - Monitored patient's difficulty initiating sleep and use of bourbon as a sleep aid.  - Evaluated insomnia as contributing to poor sleep quality, distinct from sleep apnea.  - Assessed need to transition from Cymbalta to alternative medication that may improve both sleep and pain management.  - Prescribed new medication for insomnia and pain management at night with 1-month supply and 2 refills.    PALPITATIONS:  - Monitored patient's reports of intermittent fluttering sensation in the chest, occurring approximately every 3 days.  - Evaluated the palpitations as potentially cardiac in origin.  -  Ordered 3-day cardiac monitor to assess reported heart flutter and throat discomfort.  - Instructed patient to await call to arrange cardiac monitoring setup.    CHEST PAIN:  - Monitored patient's reports of recent stabbing pains under the arm and lateral chest wall.  - Evaluated the chest pain as potentially related to muscle spasms or cardiac issues.  - Ordered 3-day cardiac monitor to assess reported chest pain and determine cardiac or musculoskeletal etiology.    ADVERSE EFFECTS OF ANTIDEPRESSANTS:  - Monitored patient's reported side effects from Cymbalta, including tunnel vision and sensation of heaviness, which led to discontinuation after 2 days.  - Prescribed alternative medication to address insomnia and pain, providing a 1-month supply with 2 refills.  - Instructed patient to contact the office if any adverse effects arise with the new medication.    DISORIENTATION:  - Recognized disorientation symptoms (feeling heavy and experiencing tunnel vision) as side effects of   Cymbalta, contributing to the decision to discontinue this medication.  - These symptoms are being addressed through the medication change noted in the Adverse Effects section.    FOLLOW-UP:  - Follow up in October for repeat labs.          Plan:       Cervicalgia  -     amitriptyline (ELAVIL) 25 MG tablet; Take 1 tablet (25 mg total) by mouth nightly as needed for Insomnia or Pain.  Dispense: 30 tablet; Refill: 2    Chronic midline thoracic back pain  -     amitriptyline (ELAVIL) 25 MG tablet; Take 1 tablet (25 mg total) by mouth nightly as needed for Insomnia or Pain.  Dispense: 30 tablet; Refill: 2    Insomnia, unspecified type  -     amitriptyline (ELAVIL) 25 MG tablet; Take 1 tablet (25 mg total) by mouth nightly as needed for Insomnia or Pain.  Dispense: 30 tablet; Refill: 2    Palpitations  -     Cardiac Monitor - 3-15 Day Adult (Cupid Only); Future    Neck muscle spasm  -     Cardiac Monitor - 3-15 Day Adult (Cupid Only);  Future                   Jas Crawley PA-C  Family Medicine Physician Assistant       Future Appointments       Next 10 Appointments       Date Provider Specialty Appt Notes    7/29/2025 Jermaine Cotton MD Neurology 1 mth f/u    7/29/2025 Jenni Martinez, PTA Outpatient Rehab Chronic scapular pain; Cervicalgia; Back Pain  2:1  POC:2W6 Expires:08/15/2025  [employer]  Authorized Visits:16  REF#[id number]    8/4/2025 Jenni Martinez, PTA Outpatient Rehab Chronic scapular pain; Cervicalgia; Back Pain  2:1  POC:2W6 Expires:08/15/2025  [employer]  Authorized Visits:16  REF#[id number]    8/5/2025 Daron De La Torre MD Pulmonology LISSET[facility] on CPAP    8/8/2025 Gabriella Solorio, PT Outpatient Rehab Chronic scapular pain; Cervicalgia; Back Pain  2:1  POC:2W6 Expires:08/15/2025  [employer]  Authorized Visits:16  REF#[id number]    8/11/2025 Gabriella Solorio, PT Outpatient Rehab Chronic scapular pain; Cervicalgia; Back Pain  2:1  POC:2W6 Expires:08/15/2025  [employer]  Authorized Visits:16  REF#[id number]    8/15/2025 Jenni Martinez, PTA Outpatient Rehab Chronic scapular pain; Cervicalgia; Back Pain  2:1  POC:2W6 Expires:08/15/2025  [employer]  Authorized Visits:16  REF#[id number]    8/18/2025 Jermaine Cotton MD Neurology 1 mth f/u    8/18/2025 Gabriella Solorio, PT Outpatient Rehab Chronic scapular pain; Cervicalgia; Back Pain  2:1  POC:2W6 Expires:08/15/2025  [employer]  Authorized Visits:16  REF#[id number]    8/22/2025 Gabriella Solorio, PT Outpatient Rehab Chronic scapular pain; Cervicalgia; Back Pain  2:1  POC:2W6 Expires:08/15/2025  [employer]  Authorized Visits:16  REF#[id number]              Displaying the next 10 appointments. This patient has additional appointments scheduled.               I spent a total of 20 minutes on the day of the visit.This includes face to face time and non-face to face time preparing to see the patient (eg, review of tests), obtaining and/or reviewing separately obtained  history, documenting clinical information in the electronic or other health record, independently interpreting results and communicating results to the patient/family/caregiver, or care coordinator.      We have addressed [4] Moderate: 1 or more chronic illnesses with exacerbation, progression, or side effects of treatment / 2 or more stable chronic illnesses / 1 undiagnosed new problem with uncertain prognosis / 1 acute illness with systemic symptoms / 1 acute complicated injury  The complexity of the data reviewed and analyzed for this visit was [3] Limited (Reviewed prior external note, ordered unique testing or reviewed the results of each unique test)   The risk of complications and/or morbidity or mortality are [4] Moderate risk (I.e. prescription drug management / decision regarding minor surgery with identified pt or procedure risk factors / decision regarding elective major surgery without identified pt or procedure risk factors / diagnosis or treatment significantly limited by social determinants of health)   The level of Medical Decision Making for this visit is [4] Moderate     This note may have been generated with the assistance of ambient listening technology. If used, verbal consent was obtained by the patient and accompanying visitor(s) for the recording of patient appointment to facilitate this note. I attest to having reviewed and edited the generated note for accuracy, though some syntax or spelling errors may persist. Please contact the author of this note for any clarification.         [1]   Current Outpatient Medications:     ALPRAZolam (XANAX) 1 MG tablet, Take 1 tablet (1 mg total) by mouth 2 (two) times daily as needed for Anxiety. Take 30 min prior to procedure, Disp: 2 tablet, Rfl: 0    aspirin (ECOTRIN) 81 MG EC tablet, Take 81 mg by mouth once daily., Disp: , Rfl:     atorvastatin (LIPITOR) 40 MG tablet, Take 1 tablet by mouth once daily., Disp: , Rfl:     baclofen (LIORESAL) 10 MG  tablet, Take 10 mg by mouth 2 (two) times daily as needed., Disp: , Rfl:     celecoxib (CELEBREX) 200 MG capsule, Take 1 capsule (200 mg total) by mouth 2 (two) times daily. Take with food. Drink plenty of water, Disp: 180 capsule, Rfl: 2    diltiaZEM (TIAZAC) 180 MG Cs24, Take 180 mg by mouth., Disp: , Rfl:     fluticasone propionate (FLONASE) 50 mcg/actuation nasal spray, 1 spray by Each Nostril route., Disp: , Rfl:     gabapentin (NEURONTIN) 300 MG capsule, Take 1 capsule (300 mg total) by mouth every evening., Disp: 30 capsule, Rfl: 11    meclizine (ANTIVERT) 25 mg tablet, Take 25 mg by mouth 3 (three) times daily as needed., Disp: , Rfl:     metoclopramide HCl (REGLAN) 10 MG tablet, Take 1 tablet (10 mg total) by mouth 2 (two) times daily as needed (migraine or nausea). Take with Benadryl 12.5 or 25 mg to reduce risk of restlessness and involuntary muscle contraction (dystonia)., Disp: 10 tablet, Rfl: 11    metoprolol succinate (TOPROL-XL) 25 MG 24 hr tablet, TAKE ONE TABLET BY MOUTH ONCE DAILY, Disp: 90 tablet, Rfl: 3    morphine (MSIR) 15 MG tablet, Take 15 mg by mouth 3 (three) times daily as needed., Disp: , Rfl:     multivit-mins/iron/folic/lycop (CENTRUM ULTRA MEN'S ORAL), Take 1 Dose by mouth once daily., Disp: , Rfl:     omeprazole (PRILOSEC) 40 MG capsule, Take 40 mg by mouth once daily., Disp: , Rfl: 6    ramipriL (ALTACE) 2.5 MG capsule, TAKE ONE CAPSULE BY MOUTH EVERY EVENING, Disp: 90 capsule, Rfl: 3    sildenafiL (VIAGRA) 100 MG tablet, Take 100 mg by mouth as needed., Disp: , Rfl:     tiZANidine (ZANAFLEX) 4 MG tablet, Take 4 mg by mouth every evening., Disp: , Rfl:     tiZANidine (ZANAFLEX) 4 MG tablet, Take 4 mg by mouth nightly as needed., Disp: , Rfl:     amitriptyline (ELAVIL) 25 MG tablet, Take 1 tablet (25 mg total) by mouth nightly as needed for Insomnia or Pain., Disp: 30 tablet, Rfl: 2    docusate sodium (COLACE) 100 MG capsule, Take 1 capsule (100 mg total) by mouth 2 (two) times  daily. (Patient not taking: Reported on 6/17/2025), Disp: 60 capsule, Rfl: 0

## 2025-07-29 ENCOUNTER — PROCEDURE VISIT (OUTPATIENT)
Dept: NEUROLOGY | Facility: CLINIC | Age: 60
End: 2025-07-29
Payer: MEDICARE

## 2025-07-29 VITALS — HEART RATE: 66 BPM | DIASTOLIC BLOOD PRESSURE: 81 MMHG | SYSTOLIC BLOOD PRESSURE: 126 MMHG

## 2025-07-29 DIAGNOSIS — M79.12 MYALGIA OF AUXILIARY MUSCLES, HEAD AND NECK: Primary | ICD-10-CM

## 2025-07-29 PROCEDURE — 20553 NJX 1/MLT TRIGGER POINTS 3/>: CPT | Mod: S$GLB,,, | Performed by: INTERNAL MEDICINE

## 2025-07-29 PROCEDURE — 76942 ECHO GUIDE FOR BIOPSY: CPT | Mod: S$GLB,,, | Performed by: INTERNAL MEDICINE

## 2025-07-29 NOTE — PROCEDURES
Trigger Point Injection    Performed by: Jermaine Cotton MD  Authorized by: Jermaine Cotton MD      Consent Done?:  Yes (Verbal)    Pre-Procedure:   Indications:  Pain  Site marked: the procedure site was marked     Timeout: prior to procedure the correct patient, procedure, and site was verified    Prep: patient was prepped and draped in usual sterile fashion     Local anesthesia used?: Yes    Anesthesia:  Local infiltration    Description of Procedure:    The patient was identified and consented verbally to procedure(s) listed below. The patient acknowledged the risks of the procedure, which include (but not necessarily limited to) pain, bleeding, bruising, infection at the injection site, nerve injury / damage, lung injury (for trigger point injections and nerve blocks in the thoracic region), bowel injury (for trigger point injections and nerve blocks of the abdomen), local anesthetic toxicity, spinal cord and/or major vessel injury (for cervical procedures). The patient wished to proceed with the procedure.     They were then placed sitting with head down in the clinic room, the site was identified / marked, and the area was sterilized using chlorhexidine.     Then, the following was performed:    Trigger point injection of the right medial occipitalis    Trigger point injection of the right lateral occipitalis    Trigger point injection of the right Trapezius using ultrasound guidance  Trigger point injection of the right supraspinatus using ultrasound guidance  Trigger point injection of the right rhomboids using ultrasound guidance    Needle used: 27G, 1.5 in (3.81 cm) needle  Type and total local anesthetic used: 10 mL of ropivacaine 0.5% and 0 mL of lidocaine 1%   Type and total steroid used:  None    The site(s) was/were cleaned and the patient was instructed to avoid submersion in water x 48 hours. They were told to let the clinic know if there were concerns for infection (increased  redness, heat, pain, swelling) in the area receiving the procedure.     Ultrasound was used for this procedure and image has been uploaded to the chart: Yes    Jermaine Cotton MD  Headache and Pain Management  [facility]

## 2025-08-04 ENCOUNTER — CLINICAL SUPPORT (OUTPATIENT)
Dept: REHABILITATION | Facility: HOSPITAL | Age: 60
End: 2025-08-04
Payer: MEDICARE

## 2025-08-04 DIAGNOSIS — G89.29 CHRONIC SCAPULAR PAIN: ICD-10-CM

## 2025-08-04 DIAGNOSIS — M25.611 DECREASED ROM OF RIGHT SHOULDER: ICD-10-CM

## 2025-08-04 DIAGNOSIS — M54.6 CHRONIC MIDLINE THORACIC BACK PAIN: ICD-10-CM

## 2025-08-04 DIAGNOSIS — M54.2 CERVICALGIA: ICD-10-CM

## 2025-08-04 DIAGNOSIS — R68.89 ACTIVITY INTOLERANCE: Primary | ICD-10-CM

## 2025-08-04 DIAGNOSIS — M89.8X1 CHRONIC SCAPULAR PAIN: ICD-10-CM

## 2025-08-04 DIAGNOSIS — G89.29 CHRONIC MIDLINE THORACIC BACK PAIN: ICD-10-CM

## 2025-08-04 PROCEDURE — 97110 THERAPEUTIC EXERCISES: CPT | Mod: PN,CQ

## 2025-08-04 PROCEDURE — 97140 MANUAL THERAPY 1/> REGIONS: CPT | Mod: PN,CQ

## 2025-08-04 NOTE — PROGRESS NOTES
"  Outpatient Rehab    Physical Therapy Visit    Patient Name: Jose Alberto Hager  MRN: [id number]  YOB: 1965  Encounter Date: 8/4/2025    Therapy Diagnosis:   Encounter Diagnoses   Name Primary?    Activity intolerance Yes    Decreased ROM of right shoulder     Chronic scapular pain     Cervicalgia     Chronic midline thoracic back pain      Physician: Jas Crawley PA-C    Physician Orders: Eval and Treat  Medical Diagnosis: Chronic scapular pain  Cervicalgia  Chronic midline thoracic back pain  Surgical Diagnosis: Not applicable for this Episode   Surgical Date: Not applicable for this Episode  Days Since Last Surgery: Not applicable for this Episode    Visit # / Visits Authorized: 4 / 16 (5 total)  Insurance Authorization Period: 7/1/2025 to 8/30/2025  Date of Evaluation: 6/30/2025  Plan of Care Certification: 6/30/2025 to 8/15/2025      PT/PTA: PTA   Number of PTA visits since last PT visit: 1  Time In: 1233   Time Out: 1339  Total Time (in minutes): 66   Total Billable Time (in minutes): 63    FOTO:  Intake Score (%): 40  Survey Score 2 (%): 53  Survey Score 3 (%): Not applicable for this Episode    Precautions:  Additional Precaution and Protocol Details: Standard  Additional Precautions and Protocol Details: Previous spinal fusion; Standard      Subjective   "It's about the same." Post previous: "like I had a workout.".  Pain reported as 6/10. "It's all still on that left."-pt later gestures to right middle trapezius with "slight headache" Pt reports experiencing discomfort in clavicular region as well.    Objective            Treatment:  Therapeutic Exercise  TE 1: UBE lvl3 x8' (reversing midway)  TE 2: Seated: Upper trapezius stretches 3x30s ea, Levator scapulae stretches 3x30s ea, Scalene/SCM 2 way (contralateral UE behind) 3x30s ea, Rhomboid stretches 3x30s, Middle trapezius stretches 3x30s ea; Chin tucks x20  TE 3: Standing: Doorway pectoral stretch 3x30s, Sleeper stretch 10x3s; Rows " w/scapular retractions (green) x20, B shoulder extension w/scapular retractions (green) x20, B shoulder ER @side w/scapular retractions (green) x20, Latissimus pull downs (green) x20; Rolling foam roll into shoulder flexion and thoracic extension 10x3s, Thread the needle to stretch lower scapular region 5x5s  Manual Therapy  MT 1: Strumming, Trigger-point release, & Myofascial release to B UT, LS, SCM, Scalene, & R deep neck flexors; B middle trapezius, Rhomboids, and proximal thoracic paraspinals  MT 2: R Scapular scouring in prone  MT 4: Suboccipital release, Manual cervical traction, Passive cervical ROM w/end range stretching      Time Entry(in minutes):  Manual Therapy Time Entry: 10  Therapeutic Exercise Time Entry: 53    Assessment & Plan   Assessment: The patient reported to physical therapy stating moderate scapular pain, stating left but gesturing to right; patient reports intermittent pain from clavicular region over upper trapezius region to middle trapezius. Jose Alberto Hager was progressed with increased stability and flexibility training to aide in reducing excess tension, improving functional mobility, improving form to reduce stress on spine/joints and improve joint space, as well as in turn improving symptoms and overall quality of life. Patient demonstrates excess tension in right deep neck flexors, bilateral levator scapulae, as well as right upper trapezius and middle trapezius; tension reducing somewhat following manual techniques. Sensitivity reducing/no longer hypersensitive.  Evaluation/Treatment Tolerance: Patient tolerated treatment well    The patient will continue to benefit from skilled outpatient physical therapy in order to address the deficits listed in the problem list on the initial evaluation, provide patient and family education, and maximize the patients level of independence in the home and community environments.     The patient's spiritual, cultural, and educational needs  were considered, and the patient is agreeable to the plan of care and goals.     Education  Education was done with Patient. The patient's learning style includes Demonstration, Listening, and Tactile. The patient Demonstrates understanding and Verbalizes understanding.         The patient was instructed in progressions as noted with cues for technique.       Plan: The patient is to be progressed within the established plan of care as tolerated in order to accomplish stated goals. Plan to incorporate additional postural stability training as tolerated. Provide updated home exercise program to better fit current diagnosis.    Goals:   Active       Long Term Goals       Facilitate decreased upper quadrant tightness (Progressing)       Start:  06/30/25    Expected End:  08/15/25            Patient will sleep > 6 hours without interruption by shoulder pain 5 of 7 nights per week (Ongoing)       Start:  06/30/25    Expected End:  08/15/25            Patient will consistently perform self care activities without assistance (Progressing)       Start:  06/30/25    Expected End:  08/15/25            Patient will reach to 2nd shelf in overhead cabinet using R UE without increase in shoulder pain (Progressing)       Start:  06/30/25    Expected End:  08/15/25            Patient will consistently utilize R UE to assist with driving activities without significant increase in symptomsl (Ongoing)       Start:  06/30/25    Expected End:  08/15/25            Patient will resume light to moderate yard care activities without increased shoulder pain/headaches (Ongoing)       Start:  06/30/25    Expected End:  08/15/25            Improve functional outcomes score to > 60% as indicated by FOTO shoulder survey (Progressing)       Start:  06/30/25    Expected End:  08/15/25            Patient will be independent in HEP  (Progressing)       Start:  06/30/25    Expected End:  08/15/25               Short Term Goals       Decrease trigger  point tenderness by at least 25% as indicated by patient report (Progressing)       Start:  06/30/25    Expected End:  07/24/25            Patient will maintain erect sitting posture without requiring frequent verbal/tactile cues to corred (Progressing)       Start:  06/30/25    Expected End:  07/24/25            Decrease maximum pain to < 7/10  (Ongoing)       Start:  06/30/25    Expected End:  07/24/25            Patient will reach to at least 1 shelf overhead using R UE without increased pian (Progressing)       Start:  06/30/25    Expected End:  07/24/25            Patient will sleep at least 5 hours without interruption caused by pain 4 of 7 nights per week (Progressing)       Start:  06/30/25    Expected End:  07/24/25                Jenni Self, PTA

## 2025-08-05 ENCOUNTER — OFFICE VISIT (OUTPATIENT)
Dept: PULMONOLOGY | Facility: CLINIC | Age: 60
End: 2025-08-05
Payer: MEDICARE

## 2025-08-05 VITALS
OXYGEN SATURATION: 95 % | DIASTOLIC BLOOD PRESSURE: 86 MMHG | HEART RATE: 81 BPM | SYSTOLIC BLOOD PRESSURE: 126 MMHG | HEIGHT: 69 IN | WEIGHT: 205 LBS | BODY MASS INDEX: 30.36 KG/M2

## 2025-08-05 DIAGNOSIS — G47.33 OSA ON CPAP: ICD-10-CM

## 2025-08-05 PROCEDURE — 4010F ACE/ARB THERAPY RXD/TAKEN: CPT | Mod: CPTII,S$GLB,, | Performed by: INTERNAL MEDICINE

## 2025-08-05 PROCEDURE — 3079F DIAST BP 80-89 MM HG: CPT | Mod: CPTII,S$GLB,, | Performed by: INTERNAL MEDICINE

## 2025-08-05 PROCEDURE — 3074F SYST BP LT 130 MM HG: CPT | Mod: CPTII,S$GLB,, | Performed by: INTERNAL MEDICINE

## 2025-08-05 PROCEDURE — 99204 OFFICE O/P NEW MOD 45 MIN: CPT | Mod: S$GLB,,, | Performed by: INTERNAL MEDICINE

## 2025-08-05 PROCEDURE — 99999 PR PBB SHADOW E&M-EST. PATIENT-LVL III: CPT | Mod: PBBFAC,,, | Performed by: INTERNAL MEDICINE

## 2025-08-05 PROCEDURE — 3008F BODY MASS INDEX DOCD: CPT | Mod: CPTII,S$GLB,, | Performed by: INTERNAL MEDICINE

## 2025-08-05 RX ORDER — MELOXICAM 15 MG/1
15 TABLET ORAL DAILY PRN
COMMUNITY
Start: 2025-07-23

## 2025-08-05 NOTE — PATIENT INSTRUCTIONS
Ahi by home study was only 5.9 in 2023.   You decline benefit from cpap use.      You related use of nasal cpap in [place] gave great sleep but full face mask not effective with excess pressure. You appreciated good night sleep there.....    Compliance report from  4/2025 showed pressure max was 10 and ahi <1.    We discuss dental appliance ?????? May not work with nasal problems.....    Nasal mask should help as did at [place] study.      Nasal passages were very tight on ct and mri scans- viewed.  Ent may help- discussed..  Use of short acting nasal decongestant may help -- 4 way -- 4 hour interval.  Use flonase 1-2 each side after 4 way     You cannot sleep on side with back pain issues-- positional therapy not effective     Machine may be set 5-12 or so, already.       Need study from [place].      Cpap tape and chin strap may help?

## 2025-08-05 NOTE — PROGRESS NOTES
8/5/2025    Jose Alberto Carrizales Lovemegan  New Patient Consult    Chief Complaint   Patient presents with    Apnea       HPI:     8/5/2025 pt worked [employer], disabled to back injury-- had surgery-- disabled since 2001.  Takes morphine chr.   Pt has seen numerous sleep docs -- frustrated as arouses with sense excess pressure.   Had compliance report viewed.  Pressure around 10 .      Pt had home sleep study in 2023 with ahi 5.9. pt had titration around 4/2024 (had used nasal mask and subjectively sleep great) and told to keep same machine.  Pt got cpap machine from [employer].      Sister has sleep apnea-- tolerates sleep apnea rx..    Pt has chr nasal stuffy, sleeps on back due to severe pain,uses morphine chr...  PFSH:  Past Medical History:   Diagnosis Date    Back pain     Coronary artery disease     High cholesterol     Hypertension     Insomnia          Past Surgical History:   Procedure Laterality Date    ANGIOGRAM, CORONARY, WITH LEFT HEART CATHETERIZATION N/A 3/4/2021    Procedure: Angiogram, Coronary, with Left Heart Cath;  Surgeon: Aquilino Rodriguez MD;  Location: [facility] CATH/EP LAB;  Service: Cardiology;  Laterality: N/A;    BACK SURGERY      CARDIAC CATHETERIZATION      CORONARY ARTERY BYPASS GRAFT      CREATION OF BYPASS OF CORONARY ARTERY USING GRAFT WITHOUT CARDIOPULMONARY PUMP OXYGENATION N/A 3/5/2021    Procedure: CABG, WITHOUT CARDIOPULMONARY PUMP OXYGENATION;  Surgeon: Toy Castillo MD;  Location: [facility] OR;  Service: Cardiothoracic;  Laterality: N/A;    KNEE SURGERY Left      Social History[1]  Family History   Problem Relation Name Age of Onset    Cancer Father          colon cancer    Brain cancer Mother          tumor     Review of patient's allergies indicates:  No Known Allergies       Review of Systems:  a review of eleven systems covering constitutional, Eye, HEENT, Psych, Respiratory, Cardiac, GI, , Musculoskeletal, Endocrine, Dermatologic was negative except for pertinent findings as  "listed ABOVE and below:  pertinent positives as above, rest good        Exam:Comprehensive exam done. /86 (BP Location: Right arm, Patient Position: Sitting)   Pulse 81   Ht 5' 9" (1.753 m)   Wt 93 kg (205 lb 0.4 oz)   SpO2 95% Comment: on room air at rest  BMI 30.28 kg/m²   Exam included Vitals as listed, and patient's appearance and affect and alertness and mood, oral exam for yeast and hygiene and pharynx lesions and Mallapatti (M) score, neck with inspection for jvd and masses and thyroid abnormalities and lymph nodes (supraclavicular and infraclavicular nodes and axillary also examined and noted if abn), chest exam included symmetry and effort and fremitus and percussion and auscultation, cardiac exam included rhythm and gallops and murmur and rubs and jvd and edema, abdominal exam for mass and hepatosplenomegaly and tenderness and hernias and bowel sounds, Musculoskeletal exam with muscle tone and posture and mobility/gait and  strength, and skin for rashes and cyanosis and pallor and turgor, extremity for clubbing.  Findings were normal except for pertinent findings listed below:  M3,chest is symmetric, no distress, normal percussion, normal fremitus and good normal breath sounds           Radiographs (ct chest and cxr) reviewed: view by direct vision      Labs reviewed           PFT was not done       Plan:  Clinical impression is apparently straight forward and impression with management as below.     Jose Alberto was seen today for apnea.    Diagnoses and all orders for this visit:    LISSETH on CPAP  -     Ambulatory referral/consult to Pulmonology  -     CPAP/BIPAP SUPPLIES        Follow up in about 4 weeks (around 9/2/2025), or if symptoms worsen or fail to improve.    Discussed with patient above for education the following:      Patient Instructions   Ahi by home study was only 5.9 in 2023.   You decline benefit from cpap use.      You related use of nasal cpap in [place] gave great sleep but " full face mask not effective with excess pressure. You appreciated good night sleep there.....    Compliance report from  4/2025 showed pressure max was 10 and ahi <1.    We discuss dental appliance ?????? May not work with nasal problems.....    Nasal mask should help as did at [place] study.      Nasal passages were very tight on ct and mri scans- viewed.  Ent may help- discussed..  Use of short acting nasal decongestant may help -- 4 way -- 4 hour interval.  Use flonase 1-2 each side after 4 way     You cannot sleep on side with back pain issues-- positional therapy not effective     Machine may be set 5-12 or so, already.       Need study from [place].      Cpap tape and chin strap may help?      Eval took 45 min       [1]   Social History  Tobacco Use    Smoking status: Never    Smokeless tobacco: Never   Substance Use Topics    Alcohol use: Yes     Comment: rarely    Drug use: Never

## 2025-08-06 ENCOUNTER — HOSPITAL ENCOUNTER (OUTPATIENT)
Dept: CARDIOLOGY | Facility: CLINIC | Age: 60
Discharge: HOME | End: 2025-08-06
Payer: MEDICARE

## 2025-08-06 DIAGNOSIS — M62.838 NECK MUSCLE SPASM: ICD-10-CM

## 2025-08-06 DIAGNOSIS — R00.2 PALPITATIONS: ICD-10-CM

## 2025-08-09 ENCOUNTER — TELEPHONE (OUTPATIENT)
Dept: PULMONOLOGY | Facility: CLINIC | Age: 60
End: 2025-08-09
Payer: MEDICARE

## 2025-08-11 ENCOUNTER — CLINICAL SUPPORT (OUTPATIENT)
Dept: REHABILITATION | Facility: HOSPITAL | Age: 60
End: 2025-08-11
Payer: MEDICARE

## 2025-08-11 DIAGNOSIS — R68.89 ACTIVITY INTOLERANCE: Primary | ICD-10-CM

## 2025-08-11 DIAGNOSIS — G89.29 CHRONIC MIDLINE THORACIC BACK PAIN: ICD-10-CM

## 2025-08-11 DIAGNOSIS — G89.29 CHRONIC SCAPULAR PAIN: ICD-10-CM

## 2025-08-11 DIAGNOSIS — M89.8X1 CHRONIC SCAPULAR PAIN: ICD-10-CM

## 2025-08-11 DIAGNOSIS — M54.6 CHRONIC MIDLINE THORACIC BACK PAIN: ICD-10-CM

## 2025-08-11 DIAGNOSIS — M54.2 CERVICALGIA: ICD-10-CM

## 2025-08-11 DIAGNOSIS — M25.611 DECREASED ROM OF RIGHT SHOULDER: ICD-10-CM

## 2025-08-11 PROCEDURE — 97140 MANUAL THERAPY 1/> REGIONS: CPT | Mod: PN

## 2025-08-11 PROCEDURE — 97110 THERAPEUTIC EXERCISES: CPT | Mod: PN

## 2025-08-16 PROBLEM — I47.10 SVT (SUPRAVENTRICULAR TACHYCARDIA): Status: ACTIVE | Noted: 2025-08-16

## 2025-08-19 ENCOUNTER — TELEPHONE (OUTPATIENT)
Dept: NEUROLOGY | Facility: CLINIC | Age: 60
End: 2025-08-19
Payer: MEDICARE

## 2025-08-20 ENCOUNTER — OFFICE VISIT (OUTPATIENT)
Dept: NEUROLOGY | Facility: CLINIC | Age: 60
End: 2025-08-20
Payer: MEDICARE

## 2025-08-20 VITALS
DIASTOLIC BLOOD PRESSURE: 86 MMHG | BODY MASS INDEX: 31.14 KG/M2 | HEART RATE: 85 BPM | WEIGHT: 210.88 LBS | SYSTOLIC BLOOD PRESSURE: 136 MMHG

## 2025-08-20 DIAGNOSIS — M54.2 CERVICALGIA: ICD-10-CM

## 2025-08-20 DIAGNOSIS — G24.3 CERVICAL DYSTONIA: Primary | ICD-10-CM

## 2025-08-20 DIAGNOSIS — G89.29 CHRONIC MIDLINE THORACIC BACK PAIN: ICD-10-CM

## 2025-08-20 DIAGNOSIS — F07.81 POST CONCUSSION SYNDROME: ICD-10-CM

## 2025-08-20 DIAGNOSIS — G43.709 CHRONIC MIGRAINE WITHOUT AURA WITHOUT STATUS MIGRAINOSUS, NOT INTRACTABLE: ICD-10-CM

## 2025-08-20 DIAGNOSIS — G47.00 INSOMNIA, UNSPECIFIED TYPE: ICD-10-CM

## 2025-08-20 DIAGNOSIS — M54.6 CHRONIC MIDLINE THORACIC BACK PAIN: ICD-10-CM

## 2025-08-20 PROCEDURE — 99999 PR PBB SHADOW E&M-EST. PATIENT-LVL III: CPT | Mod: PBBFAC,,, | Performed by: INTERNAL MEDICINE

## 2025-08-20 RX ORDER — AMITRIPTYLINE HYDROCHLORIDE 25 MG/1
25 TABLET, FILM COATED ORAL NIGHTLY
Qty: 30 TABLET | Refills: 11 | Status: SHIPPED | OUTPATIENT
Start: 2025-08-20 | End: 2026-08-20

## 2025-08-20 RX ORDER — BACLOFEN 10 MG/1
10 TABLET ORAL 2 TIMES DAILY PRN
Qty: 60 TABLET | Refills: 11 | Status: SHIPPED | OUTPATIENT
Start: 2025-08-20 | End: 2026-08-20

## 2025-08-20 RX ORDER — GABAPENTIN 300 MG/1
300 CAPSULE ORAL 2 TIMES DAILY
Qty: 60 CAPSULE | Refills: 11 | Status: SHIPPED | OUTPATIENT
Start: 2025-08-20

## 2025-08-20 RX ORDER — TIZANIDINE 4 MG/1
4 TABLET ORAL NIGHTLY PRN
Qty: 30 TABLET | Refills: 11 | Status: SHIPPED | OUTPATIENT
Start: 2025-08-20 | End: 2026-08-20

## 2025-08-27 ENCOUNTER — TELEPHONE (OUTPATIENT)
Dept: PULMONOLOGY | Facility: CLINIC | Age: 60
End: 2025-08-27
Payer: MEDICARE

## 2025-09-04 ENCOUNTER — TELEPHONE (OUTPATIENT)
Dept: PEDIATRICS | Facility: CLINIC | Age: 60
End: 2025-09-04
Payer: MEDICARE

## (undated) DEVICE — PACK PERFUSION

## (undated) DEVICE — TRAY CV ACCESS W/AUX A

## (undated) DEVICE — MARKER SKIN W/ RULER 77734

## (undated) DEVICE — GUIDEWIRE DOUBLE ENDED .035 DIA. 150CML

## (undated) DEVICE — CLIP APPLIER LARGE MCL20

## (undated) DEVICE — DRAPE CV SPLIT

## (undated) DEVICE — TUBING SUCTION FRESENIUS 9108484

## (undated) DEVICE — TRAY CV ACCESS W AUX B

## (undated) DEVICE — SHEATH PINNACLE 6FRX10CM W/GUIDEWIRE

## (undated) DEVICE — RESERVOIR FRESENIUS 9108474

## (undated) DEVICE — DRAIN CHEST DRY SUCTION

## (undated) DEVICE — SPONGE XRAY DETECTABLE 4X4

## (undated) DEVICE — BLANKET HYPOTHERMIA LARGE

## (undated) DEVICE — Device

## (undated) DEVICE — SUTURE SOFSILK 1 V-26 30 CS745

## (undated) DEVICE — WIRE TEMP PACING 20L

## (undated) DEVICE — CATHETER THORACIC 28FR 8028

## (undated) DEVICE — SET CARDIOPLEGIA MYOTHERM XP41B

## (undated) DEVICE — SOLUTION NACL 0.9% 3000ML

## (undated) DEVICE — CLIP LIGACLIP LIGATING SMALL LT100

## (undated) DEVICE — CLIP LIGACLIP LIGATING TITANIUM LG LT400

## (undated) DEVICE — KIT SURGICAL SUTURE GRO

## (undated) DEVICE — PUNCH AORTIC 4.5  RCC-45

## (undated) DEVICE — SPONGE GAUZE 4X8

## (undated) DEVICE — SET AUTOTRANSFUSION FRESENIUS 9005104

## (undated) DEVICE — TAPE CLOTH 4 MEDIPORE 2864

## (undated) DEVICE — CLIP APPLIER MEDIUM MSM20

## (undated) DEVICE — BAG DECANTER 10-102

## (undated) DEVICE — TIP BOVIE 4 0014A

## (undated) DEVICE — SUTURE STEEL 6 18 M654G

## (undated) DEVICE — SUTURE MONOCRYL 3-0 27 PS-1 MCP936H

## (undated) DEVICE — SOLUTION PREP IODINE 4OZ

## (undated) DEVICE — SUCTION YANKAUER BULB TIP W/O VENT

## (undated) DEVICE — CONNECTOR Y 1/4 X 1/2 X1/2 321244-000

## (undated) DEVICE — DRAPE BILATERAL LEG 84X80

## (undated) DEVICE — DRESSING POST OP MEPILEX  AG  4X12

## (undated) DEVICE — SUTURE SILK 1 24 SA77G

## (undated) DEVICE — CATHETER EXPO MLTPK 6FR 100X110CM

## (undated) DEVICE — GLOVE BIOGEL PI ORTHO PRO BROWN SZ 7

## (undated) DEVICE — CLIP LIGA MED LT200

## (undated) DEVICE — SUTURE SILK 2-0 CT-1 18 C022D

## (undated) DEVICE — PREP CHLORA 26ML

## (undated) DEVICE — CLIP APPLIER SM MCS20

## (undated) DEVICE — PAD BOVIE ADULT